# Patient Record
Sex: FEMALE | Race: WHITE | NOT HISPANIC OR LATINO | Employment: UNEMPLOYED | URBAN - METROPOLITAN AREA
[De-identification: names, ages, dates, MRNs, and addresses within clinical notes are randomized per-mention and may not be internally consistent; named-entity substitution may affect disease eponyms.]

---

## 2017-01-03 ENCOUNTER — HISTORICAL (OUTPATIENT)
Dept: INTERNAL MEDICINE | Facility: CLINIC | Age: 57
End: 2017-01-03

## 2017-02-17 ENCOUNTER — HISTORICAL (OUTPATIENT)
Dept: WOUND CARE | Facility: HOSPITAL | Age: 57
End: 2017-02-17

## 2017-03-23 ENCOUNTER — HISTORICAL (OUTPATIENT)
Dept: RADIOLOGY | Facility: HOSPITAL | Age: 57
End: 2017-03-23

## 2017-09-08 ENCOUNTER — HISTORICAL (OUTPATIENT)
Dept: INTERNAL MEDICINE | Facility: CLINIC | Age: 57
End: 2017-09-08

## 2017-09-08 LAB
ABS NEUT (OLG): 4.54 X10(3)/MCL (ref 2.1–9.2)
ALBUMIN SERPL-MCNC: 3.3 GM/DL (ref 3.4–5)
ALBUMIN/GLOB SERPL: 1 RATIO (ref 1–2)
ALP SERPL-CCNC: 91 UNIT/L (ref 45–117)
ALT SERPL-CCNC: 32 UNIT/L (ref 12–78)
AST SERPL-CCNC: 20 UNIT/L (ref 15–37)
BASOPHILS # BLD AUTO: 0.01 X10(3)/MCL
BASOPHILS NFR BLD AUTO: 0 % (ref 0–1)
BILIRUB SERPL-MCNC: 0.4 MG/DL (ref 0.2–1)
BILIRUBIN DIRECT+TOT PNL SERPL-MCNC: <0.1 MG/DL
BILIRUBIN DIRECT+TOT PNL SERPL-MCNC: >0.3 MG/DL
BUN SERPL-MCNC: 13 MG/DL (ref 7–18)
CALCIUM SERPL-MCNC: 8.9 MG/DL (ref 8.5–10.1)
CHLORIDE SERPL-SCNC: 112 MMOL/L (ref 98–107)
CHOLEST SERPL-MCNC: 168 MG/DL
CHOLEST/HDLC SERPL: 4 {RATIO} (ref 0–4.4)
CO2 SERPL-SCNC: 28 MMOL/L (ref 21–32)
CREAT SERPL-MCNC: 0.5 MG/DL (ref 0.6–1.3)
EOSINOPHIL # BLD AUTO: 0.31 X10(3)/MCL
EOSINOPHIL NFR BLD AUTO: 4 % (ref 0–5)
ERYTHROCYTE [DISTWIDTH] IN BLOOD BY AUTOMATED COUNT: 13.2 % (ref 11.5–14.5)
EST. AVERAGE GLUCOSE BLD GHB EST-MCNC: 117 MG/DL
GLOBULIN SER-MCNC: 3.8 GM/ML (ref 2.3–3.5)
GLUCOSE SERPL-MCNC: 86 MG/DL (ref 74–106)
HBA1C MFR BLD: 5.7 % (ref 4.2–6.3)
HCT VFR BLD AUTO: 43.5 % (ref 35–46)
HDLC SERPL-MCNC: 42 MG/DL
HGB BLD-MCNC: 14.3 GM/DL (ref 12–16)
IMM GRANULOCYTES # BLD AUTO: 0.02 10*3/UL
IMM GRANULOCYTES NFR BLD AUTO: 0 %
LDLC SERPL CALC-MCNC: 109 MG/DL (ref 0–130)
LYMPHOCYTES # BLD AUTO: 1.48 X10(3)/MCL
LYMPHOCYTES NFR BLD AUTO: 22 % (ref 15–40)
MCH RBC QN AUTO: 31.9 PG (ref 26–34)
MCHC RBC AUTO-ENTMCNC: 32.9 GM/DL (ref 31–37)
MCV RBC AUTO: 97.1 FL (ref 80–100)
MONOCYTES # BLD AUTO: 0.5 X10(3)/MCL
MONOCYTES NFR BLD AUTO: 7 % (ref 4–12)
NEUTROPHILS # BLD AUTO: 4.54 X10(3)/MCL
NEUTROPHILS NFR BLD AUTO: 66 X10(3)/MCL
PLATELET # BLD AUTO: 180 X10(3)/MCL (ref 130–400)
PMV BLD AUTO: 13 FL (ref 7.4–10.4)
POTASSIUM SERPL-SCNC: 4.1 MMOL/L (ref 3.5–5.1)
PROT SERPL-MCNC: 7.1 GM/DL (ref 6.4–8.2)
RBC # BLD AUTO: 4.48 X10(6)/MCL (ref 4–5.2)
SODIUM SERPL-SCNC: 147 MMOL/L (ref 136–145)
TRIGL SERPL-MCNC: 86 MG/DL
TSH SERPL-ACNC: 0.79 MIU/L (ref 0.36–3.74)
VLDLC SERPL CALC-MCNC: 17 MG/DL
WBC # SPEC AUTO: 6.9 X10(3)/MCL (ref 4.5–11)

## 2018-02-28 ENCOUNTER — HISTORICAL (OUTPATIENT)
Dept: INTERNAL MEDICINE | Facility: CLINIC | Age: 58
End: 2018-02-28

## 2018-02-28 LAB
ABS NEUT (OLG): 4.28 X10(3)/MCL (ref 2.1–9.2)
ALBUMIN SERPL-MCNC: 3.5 GM/DL (ref 3.4–5)
ALBUMIN/GLOB SERPL: 1 RATIO (ref 1–2)
ALP SERPL-CCNC: 82 UNIT/L (ref 45–117)
ALT SERPL-CCNC: 34 UNIT/L (ref 12–78)
AST SERPL-CCNC: 23 UNIT/L (ref 15–37)
BASOPHILS # BLD AUTO: 0.01 X10(3)/MCL
BASOPHILS NFR BLD AUTO: 0 %
BILIRUB SERPL-MCNC: 0.3 MG/DL (ref 0.2–1)
BILIRUBIN DIRECT+TOT PNL SERPL-MCNC: <0.1 MG/DL
BILIRUBIN DIRECT+TOT PNL SERPL-MCNC: ABNORMAL MG/DL
BUN SERPL-MCNC: 18 MG/DL (ref 7–18)
CALCIUM SERPL-MCNC: 8.8 MG/DL (ref 8.5–10.1)
CHLORIDE SERPL-SCNC: 106 MMOL/L (ref 98–107)
CHOLEST SERPL-MCNC: 163 MG/DL
CHOLEST/HDLC SERPL: 3.6 {RATIO} (ref 0–4.4)
CO2 SERPL-SCNC: 33 MMOL/L (ref 21–32)
CREAT SERPL-MCNC: 0.5 MG/DL (ref 0.6–1.3)
EOSINOPHIL # BLD AUTO: 0.15 10*3/UL
EOSINOPHIL NFR BLD AUTO: 2 %
ERYTHROCYTE [DISTWIDTH] IN BLOOD BY AUTOMATED COUNT: 13 % (ref 11.5–14.5)
EST. AVERAGE GLUCOSE BLD GHB EST-MCNC: 128 MG/DL
GLOBULIN SER-MCNC: 3.8 GM/ML (ref 2.3–3.5)
GLUCOSE SERPL-MCNC: 87 MG/DL (ref 74–106)
HBA1C MFR BLD: 6.1 % (ref 4.2–6.3)
HCT VFR BLD AUTO: 42.9 % (ref 35–46)
HDLC SERPL-MCNC: 45 MG/DL
HGB BLD-MCNC: 14.1 GM/DL (ref 12–16)
IMM GRANULOCYTES # BLD AUTO: 0.02 10*3/UL
IMM GRANULOCYTES NFR BLD AUTO: 0 %
LDLC SERPL CALC-MCNC: 101 MG/DL (ref 0–130)
LYMPHOCYTES # BLD AUTO: 1.48 X10(3)/MCL
LYMPHOCYTES NFR BLD AUTO: 23 % (ref 13–40)
MCH RBC QN AUTO: 32.3 PG (ref 26–34)
MCHC RBC AUTO-ENTMCNC: 32.9 GM/DL (ref 31–37)
MCV RBC AUTO: 98.4 FL (ref 80–100)
MONOCYTES # BLD AUTO: 0.55 X10(3)/MCL
MONOCYTES NFR BLD AUTO: 8 % (ref 4–12)
NEUTROPHILS # BLD AUTO: 4.28 X10(3)/MCL
NEUTROPHILS NFR BLD AUTO: 66 X10(3)/MCL
PLATELET # BLD AUTO: 165 X10(3)/MCL (ref 130–400)
PMV BLD AUTO: 12.7 FL (ref 7.4–10.4)
POTASSIUM SERPL-SCNC: 4.3 MMOL/L (ref 3.5–5.1)
PROT SERPL-MCNC: 7.3 GM/DL (ref 6.4–8.2)
RBC # BLD AUTO: 4.36 X10(6)/MCL (ref 4–5.2)
SODIUM SERPL-SCNC: 143 MMOL/L (ref 136–145)
TRIGL SERPL-MCNC: 85 MG/DL
TSH SERPL-ACNC: 1.41 MIU/L (ref 0.36–3.74)
VLDLC SERPL CALC-MCNC: 17 MG/DL
WBC # SPEC AUTO: 6.5 X10(3)/MCL (ref 4.5–11)

## 2018-09-11 ENCOUNTER — HISTORICAL (OUTPATIENT)
Dept: WOUND CARE | Facility: HOSPITAL | Age: 58
End: 2018-09-11

## 2018-12-18 ENCOUNTER — HISTORICAL (OUTPATIENT)
Dept: INTERNAL MEDICINE | Facility: CLINIC | Age: 58
End: 2018-12-18

## 2018-12-18 ENCOUNTER — HISTORICAL (OUTPATIENT)
Dept: ADMINISTRATIVE | Facility: HOSPITAL | Age: 58
End: 2018-12-18

## 2018-12-18 LAB
ABS NEUT (OLG): 5.06 X10(3)/MCL (ref 2.1–9.2)
ALBUMIN SERPL-MCNC: 3.6 GM/DL (ref 3.4–5)
ALBUMIN/GLOB SERPL: 1 RATIO (ref 1–2)
ALP SERPL-CCNC: 89 UNIT/L (ref 45–117)
ALT SERPL-CCNC: 33 UNIT/L (ref 12–78)
AST SERPL-CCNC: 22 UNIT/L (ref 15–37)
BILIRUB SERPL-MCNC: 0.3 MG/DL (ref 0.2–1)
BILIRUBIN DIRECT+TOT PNL SERPL-MCNC: <0.1 MG/DL
BILIRUBIN DIRECT+TOT PNL SERPL-MCNC: ABNORMAL MG/DL
BUN SERPL-MCNC: 15 MG/DL (ref 7–18)
CALCIUM SERPL-MCNC: 8.9 MG/DL (ref 8.5–10.1)
CHLORIDE SERPL-SCNC: 106 MMOL/L (ref 98–107)
CHOLEST SERPL-MCNC: 158 MG/DL
CHOLEST/HDLC SERPL: 4 {RATIO} (ref 0–4.4)
CO2 SERPL-SCNC: 31 MMOL/L (ref 21–32)
CREAT SERPL-MCNC: 0.5 MG/DL (ref 0.6–1.3)
EOSINOPHIL # BLD AUTO: 0.15 X10(3)/MCL
EOSINOPHIL NFR BLD AUTO: 2 %
ERYTHROCYTE [DISTWIDTH] IN BLOOD BY AUTOMATED COUNT: 12.9 % (ref 11.5–14.5)
EST. AVERAGE GLUCOSE BLD GHB EST-MCNC: 114 MG/DL
GLOBULIN SER-MCNC: 4 GM/ML (ref 2.3–3.5)
GLUCOSE SERPL-MCNC: 91 MG/DL (ref 74–106)
HBA1C MFR BLD: 5.6 % (ref 4.2–6.3)
HCT VFR BLD AUTO: 44.2 % (ref 35–46)
HDLC SERPL-MCNC: 40 MG/DL
HGB BLD-MCNC: 14.4 GM/DL (ref 12–16)
IMM GRANULOCYTES # BLD AUTO: 0.01 10*3/UL
IMM GRANULOCYTES NFR BLD AUTO: 0 %
LDLC SERPL CALC-MCNC: 94 MG/DL (ref 0–130)
LYMPHOCYTES # BLD AUTO: 1.21 X10(3)/MCL
LYMPHOCYTES NFR BLD AUTO: 17 % (ref 13–40)
MCH RBC QN AUTO: 31.8 PG (ref 26–34)
MCHC RBC AUTO-ENTMCNC: 32.6 GM/DL (ref 31–37)
MCV RBC AUTO: 97.6 FL (ref 80–100)
MONOCYTES # BLD AUTO: 0.55 X10(3)/MCL
MONOCYTES NFR BLD AUTO: 8 % (ref 4–12)
NEUTROPHILS # BLD AUTO: 5.06 X10(3)/MCL
NEUTROPHILS NFR BLD AUTO: 73 X10(3)/MCL
PLATELET # BLD AUTO: 164 X10(3)/MCL (ref 130–400)
PMV BLD AUTO: 13.3 FL (ref 7.4–10.4)
POTASSIUM SERPL-SCNC: 4.7 MMOL/L (ref 3.5–5.1)
PROT SERPL-MCNC: 7.6 GM/DL (ref 6.4–8.2)
RBC # BLD AUTO: 4.53 X10(6)/MCL (ref 4–5.2)
SODIUM SERPL-SCNC: 143 MMOL/L (ref 136–145)
TRIGL SERPL-MCNC: 118 MG/DL
TSH SERPL-ACNC: 1.36 MIU/L (ref 0.36–3.74)
VLDLC SERPL CALC-MCNC: 24 MG/DL
WBC # SPEC AUTO: 7 X10(3)/MCL (ref 4.5–11)

## 2019-01-14 ENCOUNTER — HISTORICAL (OUTPATIENT)
Dept: INTERNAL MEDICINE | Facility: CLINIC | Age: 59
End: 2019-01-14

## 2019-09-09 LAB
HIGH RISK HPV 16 (PRECISION): NEGATIVE
HIGH RISK HPV 18/45 (PRECISION): NEGATIVE
PAP RECOMMENDATION EXT: NORMAL
PAP SMEAR: NORMAL

## 2019-09-25 ENCOUNTER — HISTORICAL (OUTPATIENT)
Dept: RADIOLOGY | Facility: HOSPITAL | Age: 59
End: 2019-09-25

## 2019-10-18 ENCOUNTER — HISTORICAL (OUTPATIENT)
Dept: INTERNAL MEDICINE | Facility: CLINIC | Age: 59
End: 2019-10-18

## 2019-10-18 LAB
ABS NEUT (OLG): 3.29 X10(3)/MCL (ref 2.1–9.2)
APPEARANCE, UA: CLEAR
BACTERIA #/AREA URNS AUTO: ABNORMAL /HPF
BILIRUB UR QL STRIP: NEGATIVE
COLOR UR: YELLOW
EOSINOPHIL # BLD AUTO: 0.2 X10(3)/MCL (ref 0–0.9)
EOSINOPHIL NFR BLD AUTO: 3 %
ERYTHROCYTE [DISTWIDTH] IN BLOOD BY AUTOMATED COUNT: 13.2 % (ref 11.5–14.5)
EST. AVERAGE GLUCOSE BLD GHB EST-MCNC: 111 MG/DL
GLUCOSE (UA): NORMAL
HBA1C MFR BLD: 5.5 % (ref 4.2–6.3)
HCT VFR BLD AUTO: 43.7 % (ref 35–46)
HGB BLD-MCNC: 13.9 GM/DL (ref 12–16)
HGB UR QL STRIP: NEGATIVE
HYALINE CASTS #/AREA URNS LPF: ABNORMAL /LPF
IMM GRANULOCYTES # BLD AUTO: 0.02 10*3/UL
IMM GRANULOCYTES NFR BLD AUTO: 0 %
KETONES UR QL STRIP: NEGATIVE
LEUKOCYTE ESTERASE UR QL STRIP: NEGATIVE
LYMPHOCYTES # BLD AUTO: 1.4 X10(3)/MCL (ref 0.6–4.6)
LYMPHOCYTES NFR BLD AUTO: 26 %
MCH RBC QN AUTO: 32.3 PG (ref 26–34)
MCHC RBC AUTO-ENTMCNC: 31.8 GM/DL (ref 31–37)
MCV RBC AUTO: 101.6 FL (ref 80–100)
MONOCYTES # BLD AUTO: 0.5 X10(3)/MCL (ref 0.1–1.3)
MONOCYTES NFR BLD AUTO: 9 %
NEUTROPHILS # BLD AUTO: 3.29 X10(3)/MCL (ref 2.1–9.2)
NEUTROPHILS NFR BLD AUTO: 62 %
NITRITE UR QL STRIP: NEGATIVE
PH UR STRIP: 5.5 [PH] (ref 4.5–8)
PLATELET # BLD AUTO: 147 X10(3)/MCL (ref 130–400)
PMV BLD AUTO: 13.6 FL (ref 7.4–10.4)
PROT UR QL STRIP: 10 MG/DL
RBC # BLD AUTO: 4.3 X10(6)/MCL (ref 4–5.2)
RBC #/AREA URNS AUTO: ABNORMAL /HPF
SP GR UR STRIP: 1.02 (ref 1–1.03)
SQUAMOUS #/AREA URNS LPF: >100 /LPF
UROBILINOGEN UR STRIP-ACNC: NORMAL
WBC # SPEC AUTO: 5.3 X10(3)/MCL (ref 4.5–11)
WBC #/AREA URNS AUTO: ABNORMAL /HPF

## 2019-10-24 ENCOUNTER — HISTORICAL (OUTPATIENT)
Dept: ADMINISTRATIVE | Facility: HOSPITAL | Age: 59
End: 2019-10-24

## 2019-10-24 LAB
ALBUMIN SERPL-MCNC: 4 GM/DL (ref 3.4–5)
ALBUMIN/GLOB SERPL: 1.2 RATIO (ref 1.1–2)
ALP SERPL-CCNC: 93 UNIT/L (ref 45–117)
ALT SERPL-CCNC: 37 UNIT/L (ref 12–78)
AST SERPL-CCNC: 33 UNIT/L (ref 15–37)
BILIRUB SERPL-MCNC: 0.5 MG/DL (ref 0.2–1)
BILIRUBIN DIRECT+TOT PNL SERPL-MCNC: 0.1 MG/DL (ref 0–0.2)
BILIRUBIN DIRECT+TOT PNL SERPL-MCNC: 0.4 MG/DL
BUN SERPL-MCNC: 22 MG/DL (ref 7–18)
CALCIUM SERPL-MCNC: 8.8 MG/DL (ref 8.5–10.1)
CHLORIDE SERPL-SCNC: 108 MMOL/L (ref 98–107)
CHOLEST SERPL-MCNC: 161 MG/DL
CHOLEST/HDLC SERPL: 3.7 {RATIO} (ref 0–4.4)
CO2 SERPL-SCNC: 28 MMOL/L (ref 21–32)
CREAT SERPL-MCNC: 0.7 MG/DL (ref 0.6–1.3)
GLOBULIN SER-MCNC: 3.4 GM/ML (ref 2.3–3.5)
GLUCOSE SERPL-MCNC: 118 MG/DL (ref 74–106)
HDLC SERPL-MCNC: 44 MG/DL (ref 40–59)
LDLC SERPL CALC-MCNC: 95 MG/DL
POTASSIUM SERPL-SCNC: 3.4 MMOL/L (ref 3.5–5.1)
PROT SERPL-MCNC: 7.4 GM/DL (ref 6.4–8.2)
SODIUM SERPL-SCNC: 140 MMOL/L (ref 136–145)
TRIGL SERPL-MCNC: 109 MG/DL
VLDLC SERPL CALC-MCNC: 22 MG/DL

## 2020-09-09 LAB
BILIRUB SERPL-MCNC: NEGATIVE MG/DL
BLOOD URINE, POC: NEGATIVE
CLARITY, POC UA: CLEAR
COLOR, POC UA: YELLOW
GLUCOSE UR QL STRIP: NEGATIVE
KETONES UR QL STRIP: NEGATIVE
LEUKOCYTE EST, POC UA: NEGATIVE
NITRITE, POC UA: NEGATIVE
PH, POC UA: 6
PROTEIN, POC: NEGATIVE
SPECIFIC GRAVITY, POC UA: 1.01
UROBILINOGEN, POC UA: NORMAL

## 2020-09-24 ENCOUNTER — HISTORICAL (OUTPATIENT)
Dept: RADIOLOGY | Facility: HOSPITAL | Age: 60
End: 2020-09-24

## 2020-12-05 ENCOUNTER — HISTORICAL (OUTPATIENT)
Dept: ADMINISTRATIVE | Facility: HOSPITAL | Age: 60
End: 2020-12-05

## 2020-12-05 LAB
ABS NEUT (OLG): 5.55 X10(3)/MCL (ref 2.1–9.2)
ALBUMIN SERPL-MCNC: 4 GM/DL (ref 3.4–4.8)
ALBUMIN/GLOB SERPL: 1.1 RATIO (ref 1.1–2)
ALP SERPL-CCNC: 104 UNIT/L (ref 40–150)
ALT SERPL-CCNC: 25 UNIT/L (ref 0–55)
AST SERPL-CCNC: 27 UNIT/L (ref 5–34)
BILIRUB SERPL-MCNC: 0.6 MG/DL
BILIRUBIN DIRECT+TOT PNL SERPL-MCNC: 0.2 MG/DL (ref 0–0.5)
BILIRUBIN DIRECT+TOT PNL SERPL-MCNC: 0.4 MG/DL (ref 0–0.8)
BUN SERPL-MCNC: 12 MG/DL (ref 9.8–20.1)
CALCIUM SERPL-MCNC: 9.5 MG/DL (ref 8.4–10.2)
CHLORIDE SERPL-SCNC: 105 MMOL/L (ref 98–107)
CHOLEST SERPL-MCNC: 171 MG/DL
CHOLEST/HDLC SERPL: 4 {RATIO} (ref 0–5)
CO2 SERPL-SCNC: 29 MMOL/L (ref 23–31)
CREAT SERPL-MCNC: 0.62 MG/DL (ref 0.55–1.02)
DEPRECATED CALCIDIOL+CALCIFEROL SERPL-MC: 30.6 NG/ML (ref 30–80)
EOSINOPHIL # BLD AUTO: 0 X10(3)/MCL (ref 0–0.9)
EOSINOPHIL NFR BLD AUTO: 1 %
ERYTHROCYTE [DISTWIDTH] IN BLOOD BY AUTOMATED COUNT: 12.7 % (ref 11.5–14.5)
EST. AVERAGE GLUCOSE BLD GHB EST-MCNC: 108.3 MG/DL
GLOBULIN SER-MCNC: 3.5 GM/DL (ref 2.4–3.5)
GLUCOSE SERPL-MCNC: 99 MG/DL (ref 82–115)
HAV IGM SERPL QL IA: NONREACTIVE
HBA1C MFR BLD: 5.4 %
HBV CORE IGM SERPL QL IA: NONREACTIVE
HBV SURFACE AG SERPL QL IA: NONREACTIVE
HCT VFR BLD AUTO: 46.1 % (ref 35–46)
HCV AB SERPL QL IA: NONREACTIVE
HDLC SERPL-MCNC: 46 MG/DL (ref 35–60)
HGB BLD-MCNC: 14.4 GM/DL (ref 12–16)
IMM GRANULOCYTES # BLD AUTO: 0.03 10*3/UL
IMM GRANULOCYTES NFR BLD AUTO: 0 %
LDLC SERPL CALC-MCNC: 107 MG/DL (ref 50–140)
LYMPHOCYTES # BLD AUTO: 1.4 X10(3)/MCL (ref 0.6–4.6)
LYMPHOCYTES NFR BLD AUTO: 19 %
MCH RBC QN AUTO: 31.5 PG (ref 26–34)
MCHC RBC AUTO-ENTMCNC: 31.2 GM/DL (ref 31–37)
MCV RBC AUTO: 100.9 FL (ref 80–100)
MONOCYTES # BLD AUTO: 0.6 X10(3)/MCL (ref 0.1–1.3)
MONOCYTES NFR BLD AUTO: 7 %
NEUTROPHILS # BLD AUTO: 5.55 X10(3)/MCL (ref 2.1–9.2)
NEUTROPHILS NFR BLD AUTO: 73 %
PLATELET # BLD AUTO: 169 X10(3)/MCL (ref 130–400)
PMV BLD AUTO: 12.7 FL (ref 7.4–10.4)
POTASSIUM SERPL-SCNC: 5.3 MMOL/L (ref 3.5–5.1)
PROT SERPL-MCNC: 7.5 GM/DL (ref 5.8–7.6)
RBC # BLD AUTO: 4.57 X10(6)/MCL (ref 4–5.2)
SODIUM SERPL-SCNC: 143 MMOL/L (ref 136–145)
TRIGL SERPL-MCNC: 90 MG/DL (ref 37–140)
VLDLC SERPL CALC-MCNC: 18 MG/DL
WBC # SPEC AUTO: 7.6 X10(3)/MCL (ref 4.5–11)

## 2020-12-10 ENCOUNTER — HISTORICAL (OUTPATIENT)
Dept: INTERNAL MEDICINE | Facility: CLINIC | Age: 60
End: 2020-12-10

## 2020-12-10 LAB
ALBUMIN SERPL-MCNC: 4 GM/DL (ref 3.4–4.8)
ALBUMIN/GLOB SERPL: 1.1 RATIO (ref 1.1–2)
ALP SERPL-CCNC: 107 UNIT/L (ref 40–150)
ALT SERPL-CCNC: 24 UNIT/L (ref 0–55)
AST SERPL-CCNC: 22 UNIT/L (ref 5–34)
BILIRUB SERPL-MCNC: 0.5 MG/DL
BILIRUBIN DIRECT+TOT PNL SERPL-MCNC: 0.2 MG/DL (ref 0–0.5)
BILIRUBIN DIRECT+TOT PNL SERPL-MCNC: 0.3 MG/DL (ref 0–0.8)
BUN SERPL-MCNC: 11 MG/DL (ref 9.8–20.1)
CALCIUM SERPL-MCNC: 9.3 MG/DL (ref 8.4–10.2)
CHLORIDE SERPL-SCNC: 105 MMOL/L (ref 98–107)
CO2 SERPL-SCNC: 27 MMOL/L (ref 23–31)
CREAT SERPL-MCNC: 0.63 MG/DL (ref 0.55–1.02)
GLOBULIN SER-MCNC: 3.6 GM/DL (ref 2.4–3.5)
GLUCOSE SERPL-MCNC: 91 MG/DL (ref 82–115)
POTASSIUM SERPL-SCNC: 4.4 MMOL/L (ref 3.5–5.1)
PROT SERPL-MCNC: 7.6 GM/DL (ref 5.8–7.6)
SODIUM SERPL-SCNC: 142 MMOL/L (ref 136–145)

## 2021-02-18 ENCOUNTER — HISTORICAL (OUTPATIENT)
Dept: RADIOLOGY | Facility: HOSPITAL | Age: 61
End: 2021-02-18

## 2021-03-10 ENCOUNTER — HISTORICAL (OUTPATIENT)
Dept: INTERNAL MEDICINE | Facility: CLINIC | Age: 61
End: 2021-03-10

## 2021-03-10 LAB
ABS NEUT (OLG): 4.99 X10(3)/MCL (ref 2.1–9.2)
ALBUMIN SERPL-MCNC: 3.7 GM/DL (ref 3.4–4.8)
ALBUMIN/GLOB SERPL: 1 RATIO (ref 1.1–2)
ALP SERPL-CCNC: 95 UNIT/L (ref 40–150)
ALT SERPL-CCNC: 29 UNIT/L (ref 0–55)
AST SERPL-CCNC: 29 UNIT/L (ref 5–34)
BASOPHILS # BLD AUTO: 0 X10(3)/MCL (ref 0–0.2)
BASOPHILS NFR BLD AUTO: 0 %
BILIRUB SERPL-MCNC: 0.4 MG/DL
BILIRUBIN DIRECT+TOT PNL SERPL-MCNC: 0.2 MG/DL (ref 0–0.5)
BILIRUBIN DIRECT+TOT PNL SERPL-MCNC: 0.2 MG/DL (ref 0–0.8)
BUN SERPL-MCNC: 12.5 MG/DL (ref 9.8–20.1)
CALCIUM SERPL-MCNC: 9.3 MG/DL (ref 8.4–10.2)
CHLORIDE SERPL-SCNC: 107 MMOL/L (ref 98–107)
CHOLEST SERPL-MCNC: 170 MG/DL
CHOLEST/HDLC SERPL: 4 {RATIO} (ref 0–5)
CO2 SERPL-SCNC: 28 MMOL/L (ref 23–31)
CREAT SERPL-MCNC: 0.57 MG/DL (ref 0.55–1.02)
EOSINOPHIL # BLD AUTO: 0.1 X10(3)/MCL (ref 0–0.9)
EOSINOPHIL NFR BLD AUTO: 1 %
ERYTHROCYTE [DISTWIDTH] IN BLOOD BY AUTOMATED COUNT: 13.4 % (ref 11.5–14.5)
GLOBULIN SER-MCNC: 3.6 GM/DL (ref 2.4–3.5)
GLUCOSE SERPL-MCNC: 86 MG/DL (ref 82–115)
HCT VFR BLD AUTO: 45.7 % (ref 35–46)
HDLC SERPL-MCNC: 46 MG/DL (ref 35–60)
HGB BLD-MCNC: 14.3 GM/DL (ref 12–16)
IMM GRANULOCYTES # BLD AUTO: 0.03 10*3/UL
IMM GRANULOCYTES NFR BLD AUTO: 0 %
LDLC SERPL CALC-MCNC: 106 MG/DL (ref 50–140)
LYMPHOCYTES # BLD AUTO: 1.3 X10(3)/MCL (ref 0.6–4.6)
LYMPHOCYTES NFR BLD AUTO: 18 %
MCH RBC QN AUTO: 31.4 PG (ref 26–34)
MCHC RBC AUTO-ENTMCNC: 31.3 GM/DL (ref 31–37)
MCV RBC AUTO: 100.4 FL (ref 80–100)
MONOCYTES # BLD AUTO: 0.7 X10(3)/MCL (ref 0.1–1.3)
MONOCYTES NFR BLD AUTO: 10 %
NEUTROPHILS # BLD AUTO: 4.99 X10(3)/MCL (ref 2.1–9.2)
NEUTROPHILS NFR BLD AUTO: 70 %
PLATELET # BLD AUTO: 150 X10(3)/MCL (ref 130–400)
PMV BLD AUTO: 12.7 FL (ref 7.4–10.4)
POTASSIUM SERPL-SCNC: 5.2 MMOL/L (ref 3.5–5.1)
PROT SERPL-MCNC: 7.3 GM/DL (ref 5.8–7.6)
RBC # BLD AUTO: 4.55 X10(6)/MCL (ref 4–5.2)
SODIUM SERPL-SCNC: 145 MMOL/L (ref 136–145)
TRIGL SERPL-MCNC: 90 MG/DL (ref 37–140)
TSH SERPL-ACNC: 1.38 UIU/ML (ref 0.35–4.94)
VLDLC SERPL CALC-MCNC: 18 MG/DL
WBC # SPEC AUTO: 7.2 X10(3)/MCL (ref 4.5–11)

## 2021-03-11 ENCOUNTER — HISTORICAL (OUTPATIENT)
Dept: ADMINISTRATIVE | Facility: HOSPITAL | Age: 61
End: 2021-03-11

## 2021-03-11 ENCOUNTER — HISTORICAL (OUTPATIENT)
Dept: RADIOLOGY | Facility: HOSPITAL | Age: 61
End: 2021-03-11

## 2021-04-01 ENCOUNTER — HISTORICAL (OUTPATIENT)
Dept: RADIOLOGY | Facility: HOSPITAL | Age: 61
End: 2021-04-01

## 2021-05-13 ENCOUNTER — HISTORICAL (OUTPATIENT)
Dept: RADIOLOGY | Facility: HOSPITAL | Age: 61
End: 2021-05-13

## 2021-06-09 ENCOUNTER — HISTORICAL (OUTPATIENT)
Dept: INTERNAL MEDICINE | Facility: CLINIC | Age: 61
End: 2021-06-09

## 2021-06-09 LAB
ABS NEUT (OLG): 4.75 X10(3)/MCL (ref 2.1–9.2)
ALBUMIN SERPL-MCNC: 3.8 GM/DL (ref 3.4–4.8)
ALBUMIN/GLOB SERPL: 1.2 RATIO (ref 1.1–2)
ALP SERPL-CCNC: 102 UNIT/L (ref 40–150)
ALT SERPL-CCNC: 32 UNIT/L (ref 0–55)
APPEARANCE, UA: CLEAR
AST SERPL-CCNC: 31 UNIT/L (ref 5–34)
BACTERIA #/AREA URNS AUTO: ABNORMAL /HPF
BASOPHILS # BLD AUTO: 0 X10(3)/MCL (ref 0–0.2)
BASOPHILS NFR BLD AUTO: 0 %
BILIRUB SERPL-MCNC: 0.4 MG/DL
BILIRUB UR QL STRIP: NEGATIVE
BILIRUBIN DIRECT+TOT PNL SERPL-MCNC: 0.2 MG/DL (ref 0–0.5)
BILIRUBIN DIRECT+TOT PNL SERPL-MCNC: 0.2 MG/DL (ref 0–0.8)
BUN SERPL-MCNC: 18.8 MG/DL (ref 9.8–20.1)
CALCIUM SERPL-MCNC: 9.5 MG/DL (ref 8.4–10.2)
CHLORIDE SERPL-SCNC: 103 MMOL/L (ref 98–107)
CHOLEST SERPL-MCNC: 162 MG/DL
CHOLEST/HDLC SERPL: 4 {RATIO} (ref 0–5)
CO2 SERPL-SCNC: 29 MMOL/L (ref 23–31)
COLOR UR: YELLOW
CREAT SERPL-MCNC: 0.59 MG/DL (ref 0.55–1.02)
DEPRECATED CALCIDIOL+CALCIFEROL SERPL-MC: 38.7 NG/ML (ref 30–80)
EOSINOPHIL # BLD AUTO: 0 X10(3)/MCL (ref 0–0.9)
EOSINOPHIL NFR BLD AUTO: 1 %
ERYTHROCYTE [DISTWIDTH] IN BLOOD BY AUTOMATED COUNT: 13.1 % (ref 11.5–14.5)
GLOBULIN SER-MCNC: 3.3 GM/DL (ref 2.4–3.5)
GLUCOSE (UA): NEGATIVE
GLUCOSE SERPL-MCNC: 85 MG/DL (ref 82–115)
HCT VFR BLD AUTO: 45 % (ref 35–46)
HDLC SERPL-MCNC: 42 MG/DL (ref 35–60)
HGB BLD-MCNC: 14.2 GM/DL (ref 12–16)
HGB UR QL STRIP: NEGATIVE
HYALINE CASTS #/AREA URNS LPF: ABNORMAL /LPF
IMM GRANULOCYTES # BLD AUTO: 0.03 10*3/UL
IMM GRANULOCYTES NFR BLD AUTO: 0 %
KETONES UR QL STRIP: NEGATIVE
LDLC SERPL CALC-MCNC: 103 MG/DL (ref 50–140)
LEUKOCYTE ESTERASE UR QL STRIP: NEGATIVE
LYMPHOCYTES # BLD AUTO: 1.4 X10(3)/MCL (ref 0.6–4.6)
LYMPHOCYTES NFR BLD AUTO: 20 %
MCH RBC QN AUTO: 31.6 PG (ref 26–34)
MCHC RBC AUTO-ENTMCNC: 31.6 GM/DL (ref 31–37)
MCV RBC AUTO: 100 FL (ref 80–100)
MONOCYTES # BLD AUTO: 0.7 X10(3)/MCL (ref 0.1–1.3)
MONOCYTES NFR BLD AUTO: 10 %
NEUTROPHILS # BLD AUTO: 4.75 X10(3)/MCL (ref 2.1–9.2)
NEUTROPHILS NFR BLD AUTO: 69 %
NITRITE UR QL STRIP: NEGATIVE
NRBC BLD AUTO-RTO: 0 % (ref 0–0.2)
PH UR STRIP: 5.5 [PH] (ref 4.5–8)
PLATELET # BLD AUTO: 160 X10(3)/MCL (ref 130–400)
PMV BLD AUTO: 12.2 FL (ref 7.4–10.4)
POTASSIUM SERPL-SCNC: 4.1 MMOL/L (ref 3.5–5.1)
PROT SERPL-MCNC: 7.1 GM/DL (ref 5.8–7.6)
PROT UR QL STRIP: 20 MG/DL
RBC # BLD AUTO: 4.5 X10(6)/MCL (ref 4–5.2)
RBC #/AREA URNS AUTO: ABNORMAL /HPF
SODIUM SERPL-SCNC: 141 MMOL/L (ref 136–145)
SP GR UR STRIP: 1.03 (ref 1–1.03)
SQUAMOUS #/AREA URNS LPF: >100 /LPF
TRIGL SERPL-MCNC: 84 MG/DL (ref 37–140)
UROBILINOGEN UR STRIP-ACNC: 2 MG/DL
VLDLC SERPL CALC-MCNC: 17 MG/DL
WBC # SPEC AUTO: 6.9 X10(3)/MCL (ref 4.5–11)
WBC #/AREA URNS AUTO: ABNORMAL /HPF

## 2021-06-10 ENCOUNTER — HISTORICAL (OUTPATIENT)
Dept: ADMINISTRATIVE | Facility: HOSPITAL | Age: 61
End: 2021-06-10

## 2021-06-10 LAB
APPEARANCE, UA: CLEAR
BACTERIA #/AREA URNS AUTO: ABNORMAL /HPF
BILIRUB UR QL STRIP: NEGATIVE
COLOR UR: YELLOW
GLUCOSE (UA): NEGATIVE
HGB UR QL STRIP: NEGATIVE
HYALINE CASTS #/AREA URNS LPF: ABNORMAL /LPF
KETONES UR QL STRIP: NEGATIVE
LEUKOCYTE ESTERASE UR QL STRIP: NEGATIVE
NITRITE UR QL STRIP: NEGATIVE
PH UR STRIP: 5.5 [PH] (ref 4.5–8)
PROT UR QL STRIP: 10 MG/DL
RBC #/AREA URNS AUTO: ABNORMAL /HPF
SP GR UR STRIP: 1.02 (ref 1–1.03)
SQUAMOUS #/AREA URNS LPF: >100 /LPF
UROBILINOGEN UR STRIP-ACNC: 2 MG/DL
WBC #/AREA URNS AUTO: ABNORMAL /HPF

## 2021-07-06 ENCOUNTER — HISTORICAL (OUTPATIENT)
Dept: RADIOLOGY | Facility: HOSPITAL | Age: 61
End: 2021-07-06

## 2021-07-29 ENCOUNTER — HISTORICAL (OUTPATIENT)
Dept: SLEEP MEDICINE | Facility: HOSPITAL | Age: 61
End: 2021-07-29

## 2022-03-10 ENCOUNTER — HISTORICAL (OUTPATIENT)
Dept: INTERNAL MEDICINE | Facility: CLINIC | Age: 62
End: 2022-03-10

## 2022-03-10 LAB
ABS NEUT (OLG): 4.26 (ref 2.1–9.2)
ALBUMIN SERPL-MCNC: 3.6 G/DL (ref 3.4–4.8)
ALBUMIN/GLOB SERPL: 1.1 {RATIO} (ref 1.1–2)
ALP SERPL-CCNC: 86 U/L (ref 40–150)
ALT SERPL-CCNC: 33 U/L (ref 0–55)
AST SERPL-CCNC: 29 U/L (ref 5–34)
BASOPHILS # BLD AUTO: 0 10*3/UL (ref 0–0.2)
BASOPHILS NFR BLD AUTO: 0 %
BILIRUB SERPL-MCNC: 0.4 MG/DL
BILIRUBIN DIRECT+TOT PNL SERPL-MCNC: 0.2 (ref 0–0.5)
BILIRUBIN DIRECT+TOT PNL SERPL-MCNC: 0.2 (ref 0–0.8)
BUN SERPL-MCNC: 11.5 MG/DL (ref 9.8–20.1)
CALCIUM SERPL-MCNC: 9.4 MG/DL (ref 8.7–10.5)
CHLORIDE SERPL-SCNC: 109 MMOL/L (ref 98–107)
CHOLEST SERPL-MCNC: 169 MG/DL
CHOLEST/HDLC SERPL: 4 {RATIO} (ref 0–5)
CO2 SERPL-SCNC: 29 MMOL/L (ref 23–31)
CREAT SERPL-MCNC: 0.53 MG/DL (ref 0.55–1.02)
DEPRECATED CALCIDIOL+CALCIFEROL SERPL-MC: 36.7 NG/ML (ref 30–80)
EOSINOPHIL # BLD AUTO: 0.1 10*3/UL (ref 0–0.9)
EOSINOPHIL NFR BLD AUTO: 1 %
ERYTHROCYTE [DISTWIDTH] IN BLOOD BY AUTOMATED COUNT: 13.8 % (ref 11.5–14.5)
FLAG2 (OHS): 70
FLAG3 (OHS): 80
FLAGS (OHS): 70
GLOBULIN SER-MCNC: 3.2 G/DL (ref 2.4–3.5)
GLUCOSE SERPL-MCNC: 91 MG/DL (ref 82–115)
HCT VFR BLD AUTO: 44.3 % (ref 35–46)
HDLC SERPL-MCNC: 42 MG/DL (ref 35–60)
HEMOLYSIS INTERF INDEX SERPL-ACNC: 1
HGB BLD-MCNC: 14.1 G/DL (ref 12–16)
ICTERIC INTERF INDEX SERPL-ACNC: 0
IMM GRANULOCYTES # BLD AUTO: 0.02 10*3/UL
IMM GRANULOCYTES NFR BLD AUTO: 0 %
LDLC SERPL CALC-MCNC: 107 MG/DL (ref 50–140)
LIPEMIC INTERF INDEX SERPL-ACNC: <0
LOW EVENT # SUSPECT FLAG (OHS): 70
LYMPHOCYTES # BLD AUTO: 1.2 10*3/UL (ref 0.6–4.6)
LYMPHOCYTES NFR BLD AUTO: 19 %
MANUAL DIFF? (OHS): NO
MCH RBC QN AUTO: 32 PG (ref 26–34)
MCHC RBC AUTO-ENTMCNC: 31.8 G/DL (ref 31–37)
MCV RBC AUTO: 100.5 FL (ref 80–100)
MO BLASTS SUSPECT FLAG (OHS): 30
MONOCYTES # BLD AUTO: 0.6 10*3/UL (ref 0.1–1.3)
MONOCYTES NFR BLD AUTO: 9 %
NEUTROPHILS # BLD AUTO: 4.26 10*3/UL (ref 2.1–9.2)
NEUTROPHILS NFR BLD AUTO: 70 %
NRBC BLD AUTO-RTO: 0 % (ref 0–0.2)
PLATELET # BLD AUTO: 171 10*3/UL (ref 130–400)
PLATELET CLUMPS SUSPECT FLAG (OHS): 30
PMV BLD AUTO: 12.4 FL (ref 7.4–10.4)
POTASSIUM SERPL-SCNC: 4.9 MMOL/L (ref 3.5–5.1)
PROT SERPL-MCNC: 6.8 G/DL (ref 5.8–7.6)
RBC # BLD AUTO: 4.41 10*6/UL (ref 4–5.2)
SODIUM SERPL-SCNC: 144 MMOL/L (ref 136–145)
TRIGL SERPL-MCNC: 99 MG/DL (ref 37–140)
VLDLC SERPL CALC-MCNC: 20 MG/DL
WBC # SPEC AUTO: 6.1 10*3/UL (ref 4.5–11)

## 2022-04-12 ENCOUNTER — HISTORICAL (OUTPATIENT)
Dept: ADMINISTRATIVE | Facility: HOSPITAL | Age: 62
End: 2022-04-12
Payer: MEDICAID

## 2022-04-28 ENCOUNTER — HISTORICAL (OUTPATIENT)
Dept: RADIOLOGY | Facility: HOSPITAL | Age: 62
End: 2022-04-28
Payer: MEDICAID

## 2022-04-30 VITALS
BODY MASS INDEX: 34.4 KG/M2 | WEIGHT: 170.63 LBS | SYSTOLIC BLOOD PRESSURE: 121 MMHG | HEIGHT: 59 IN | DIASTOLIC BLOOD PRESSURE: 82 MMHG | OXYGEN SATURATION: 98 %

## 2022-05-05 NOTE — HISTORICAL [facility] CERNER
This is a historical note converted from [employer]. Formatting and pictures may have been removed.  Please reference [employer] for original formatting and attached multimedia. Chief Complaint  FOLLOW UP,RIGHT LEG/KNEE ?PAIN/NUMBNESS/BURNING ?GETTING WORSE ,INJURY 6 YEARS AGO AND 2 FALLS YEARS AGO,KNOT TO RIGHT WRIST  History of Present Illness  60?year old white female, here for f/u labs. BP elevated today at 166/90. Not on meds. c/o burning?pain to right knee and right upper leg and thigh. Had a fall?yrs ago. Also states right wrist?knot.  PMH HLD, HTN, tobacco user 1/2ppd, depression, chronic right knee pain. Mood stable.Taking Effexor 75mg and 37.5mg daily.Was tried on hctz?in the past?but stopped?due to it dropping her too low. Lisinopril caused angioedema.  Denies chest pain, shortness of breath,?cough, fever, headache,?dizziness, weakness, abdominal pain, nausea,?vomiting, diarrhea, constipation, dysuria,?SI, HI,?anxiety.  ?  ?   FIT neg 12/8/2020  Flu - Given 11/2020  Shingrix No. 1 given 12/5/2020;?repeat shingles vaccine due today  Following [facility]  ?  MMG 2/18/2021; IMPRESSION: INCOMPLETE: NEEDS ADDITIONAL IMAGING EVALUATION  Right Breast: Negative (BI-RADS 1)  Left Breast: Incomplete - Need Additional Imaging Evaluation (BI-RADS 0); pt scheduled for today for MMG and US left breast.  ?  Review of Systems  Constitutional: negative except as stated in HPI  Eye: negative except as stated in HPI  ENMT: negative except as stated in HPI  Respiratory: negative except as stated in HPI  Cardiovascular: negative except as stated in HPI  Gastrointestinal: negative except as stated in HPI  Genitourinary: negative except as stated in HPI  Hema/Lymph: negative except as stated in HPI  Endocrine: negative except as stated in HPI  Immunologic: negative except as stated in HPI  Musculoskeletal: negative except as stated in HPI  Integumentary: negative except as stated in HPI  Neurologic: negative except as stated in HPI  ?    All Other ROS_ ?negative except as stated in HPI  Physical Exam  Vitals & Measurements  T:?36.7? ?C (Oral)? HR:?64(Peripheral)? RR:?16? BP:?166/90?  HT:?150.00?cm? WT:?77.800?kg? BMI:?34.58?  General: Alert and oriented, No acute distress.  Eye: Pupils are equal, round and reactive to light, Extraocular movements are intact, Normal conjunctiva.  HENT: Normocephalic, Normal hearing, Oral mucosa is moist, No pharyngeal erythema.  Neck: Supple, Non-tender, No lymphadenopathy, No thyromegaly.  Respiratory: Lungs are clear to auscultation, Respirations are non-labored, Breath sounds are equal, Symmetrical chest wall expansion.  Cardiovascular: Normal rate, Regular rhythm, No murmur, Normal peripheral perfusion, No edema.  Gastrointestinal: Soft, Non-tender, Non-distended, Normal bowel sounds, No organomegaly.  Genitourinary: No costovertebral angle tenderness, No inguinal tenderness.  Lymphatics: No lymphadenopathy neck, axilla, groin.  Musculoskeletal: Normal range of motion, Normal strength, No tenderness, Normal gait. right wrist nodulesmall and nonpainful. ROM full.  Right lateral knee mild swelling, nontender to palp. No increased warmth, swelling noted to leg  Neurologic: No focal deficits, Cranial Nerves II-XII are grossly intact.  Integumentary: Warm, Dry, Intact.  Feet: 2+ pedal pulses bilaterally.  ?  Assessment/Plan  Depression?F32.9  ?Mood stable. Effexor refilled.  Ordered:  1160F- Medication reconciliation completed during visit, HTN (hypertension)  HLD (hyperlipidemia)  Ganglion cyst of wrist  Right knee pain  Vitamin D deficiency  Depression  Need for shingles vaccine  Tobacco use  Obesity, [facility], 03/11/21 11:15:00 CST  Clinic Follow up, *Est. 06/11/21 3:00:00 CDT, Order for future visit, HTN (hypertension)  HLD (hyperlipidemia)  Right knee pain  Ganglion cyst of wrist  Depression  Tobacco use  Obesity, [facility]  Office/Outpatient Visit Level 4 Established 78078 PC, HTN  (hypertension)  HLD (hyperlipidemia)  Ganglion cyst of wrist  Right knee pain  Vitamin D deficiency  Depression  Need for shingles vaccine  Tobacco use  Obesity, [facility], 03/11/21 11:15:00 CST  ?  Ganglion cyst of wrist?M67.439  ?Will continue to monitor, small and nonpainful. ROM full.  Ordered:  1160F- Medication reconciliation completed during visit, HTN (hypertension)  HLD (hyperlipidemia)  Ganglion cyst of wrist  Right knee pain  Vitamin D deficiency  Depression  Need for shingles vaccine  Tobacco use  Obesity, [facility], 03/11/21 11:15:00 CST  Clinic Follow up, *Est. 06/11/21 3:00:00 CDT, Order for future visit, HTN (hypertension)  HLD (hyperlipidemia)  Right knee pain  Ganglion cyst of wrist  Depression  Tobacco use  Obesity, [facility]  Office/Outpatient Visit Level 4 Established 78373 PC, HTN (hypertension)  HLD (hyperlipidemia)  Ganglion cyst of wrist  Right knee pain  Vitamin D deficiency  Depression  Need for shingles vaccine  Tobacco use  [facility], [facility], 03/11/21 11:15:00 CST  ?  HLD (hyperlipidemia)?E78.5  . Simvastatin continued. ASA 81 every other day.?Low fat diet; more whole grain products, fruits, vegetables, lean meats, fish, and poultry.  Ordered:  1160F- Medication reconciliation completed during visit, HTN (hypertension)  HLD (hyperlipidemia)  Ganglion cyst of wrist  Right knee pain  Vitamin D deficiency  Depression  Need for shingles vaccine  Tobacco use  Obesity, [facility], 03/11/21 11:15:00 CST  CBC w/ Auto Diff, Routine collect, *Est. 06/11/21 3:00:00 CDT, Blood, Order for future visit, *Est. Stop date 06/11/21 3:00:00 CDT, Lab Collect, HLD (hyperlipidemia)  HTN (hypertension), 03/11/21 11:15:00 CST  Clinic Follow up, *Est. 06/11/21 3:00:00 CDT, Order for future visit, HTN (hypertension)  HLD (hyperlipidemia)  Right knee pain  Ganglion cyst of wrist  Depression  Tobacco use  Obesity, [facility]  Comprehensive  Metabolic Panel, Routine collect, *Est. 06/11/21 3:00:00 CDT, Blood, Order for future visit, *Est. Stop date 06/11/21 3:00:00 CDT, Lab Collect, HLD (hyperlipidemia)  HTN (hypertension), 03/11/21 11:15:00 CST  Lipid Panel, Routine collect, *Est. 06/11/21 3:00:00 CDT, Blood, Order for future visit, *Est. Stop date 06/11/21 3:00:00 CDT, Lab Collect, HLD (hyperlipidemia)  HTN (hypertension), 03/11/21 11:15:00 CST  Office/Outpatient Visit Level 4 Established 38702 PC, HTN (hypertension)  HLD (hyperlipidemia)  Ganglion cyst of wrist  Right knee pain  Vitamin D deficiency  Depression  Need for shingles vaccine  Tobacco use  Obesity, [facility], 03/11/21 11:15:00 CST  ?  HTN (hypertension)?I10  BP elevated, start on losartan 25 daily.  IM BP check 2 wks.  DASH diet: Eat more fruits, vegetables, and low fat dairy foods. Low sodium diet.  Ordered:  1160F- Medication reconciliation completed during visit, HTN (hypertension)  HLD (hyperlipidemia)  Ganglion cyst of wrist  Right knee pain  Vitamin D deficiency  Depression  Need for shingles vaccine  Tobacco use  Obesity, [facility], 03/11/21 11:15:00 CST  CBC w/ Auto Diff, Routine collect, *Est. 06/11/21 3:00:00 CDT, Blood, Order for future visit, *Est. Stop date 06/11/21 3:00:00 CDT, Lab Collect, HLD (hyperlipidemia)  HTN (hypertension), 03/11/21 11:15:00 CST  Clinic Follow up, *Est. 03/25/21 3:00:00 CDT, bp check, Order for future visit, HTN (hypertension), [facility]  Clinic Follow up, *Est. 06/11/21 3:00:00 CDT, Order for future visit, HTN (hypertension)  HLD (hyperlipidemia)  Right knee pain  Ganglion cyst of wrist  Depression  Tobacco use  Obesity, [facility]  Comprehensive Metabolic Panel, Routine collect, *Est. 06/11/21 3:00:00 CDT, Blood, Order for future visit, *Est. Stop date 06/11/21 3:00:00 CDT, Lab Collect, HLD (hyperlipidemia)  HTN (hypertension), 03/11/21 11:15:00 CST  Lipid Panel, Routine collect, *Est. 06/11/21 3:00:00 CDT,  Blood, Order for future visit, *Est. Stop date 06/11/21 3:00:00 CDT, Lab Collect, HLD (hyperlipidemia)  HTN (hypertension), 03/11/21 11:15:00 CST  Office/Outpatient Visit Level 4 Established 42007 PC, HTN (hypertension)  HLD (hyperlipidemia)  Ganglion cyst of wrist  Right knee pain  Vitamin D deficiency  Depression  Need for shingles vaccine  Tobacco use  [facility], [facility], 03/11/21 11:15:00 CST  Urinalysis with Microscopic if Indicated, Routine collect, Urine, Order for future visit, *Est. 06/11/21 3:00:00 CDT, *Est. Stop date 06/11/21 3:00:00 CDT, Nurse collect, HTN (hypertension)  ?  Need for shingles vaccine?Z23  ?2nd shot administered today  Ordered:  zoster vaccine, inactivated, 0.5 mL, form: Powder-Inj, IM, Once-Unscheduled, first dose 03/11/21 11:12:00 CST  1160F- Medication reconciliation completed during visit, HTN (hypertension)  HLD (hyperlipidemia)  Ganglion cyst of wrist  Right knee pain  Vitamin D deficiency  Depression  Need for shingles vaccine  Tobacco use  [facility], [facility], 03/11/21 11:15:00 CST  Office/Outpatient Visit Level 4 Established 60979 PC, HTN (hypertension)  HLD (hyperlipidemia)  Ganglion cyst of wrist  Right knee pain  Vitamin D deficiency  Depression  Need for shingles vaccine  Tobacco use  [facility], [facility], 03/11/21 11:15:00 CST  ?  Obesity?E66.9  ?Low fat diet and 150 minutes?of aerobic exercise per week  Ordered:  1160F- Medication reconciliation completed during visit, HTN (hypertension)  HLD (hyperlipidemia)  Ganglion cyst of wrist  Right knee pain  Vitamin D deficiency  Depression  Need for shingles vaccine  Tobacco use  [facility], [facility], 03/11/21 11:15:00 CST  Clinic Follow up, *Est. 06/11/21 3:00:00 CDT, Order for future visit, HTN (hypertension)  HLD (hyperlipidemia)  Right knee pain  Ganglion cyst of wrist  Depression  Tobacco use  Obesity, [facility]  Office/Outpatient Visit Level 4 Established 33813 PC, HTN  (hypertension)  HLD (hyperlipidemia)  Ganglion cyst of wrist  Right knee pain  Vitamin D deficiency  Depression  Need for shingles vaccine  Tobacco use  Obesity, [facility], 03/11/21 11:15:00 CST  ?  Right knee pain?M25.561  XR right knee  HEP  mobic 7.5 daily  Ordered:  1160F- Medication reconciliation completed during visit, HTN (hypertension)  HLD (hyperlipidemia)  Ganglion cyst of wrist  Right knee pain  Vitamin D deficiency  Depression  Need for shingles vaccine  Tobacco use  Obesity, [facility], 03/11/21 11:15:00 CST  Clinic Follow up, *Est. 06/11/21 3:00:00 CDT, Order for future visit, HTN (hypertension)  HLD (hyperlipidemia)  Right knee pain  Ganglion cyst of wrist  Depression  Tobacco use  Obesity, [facility]  Office/Outpatient Visit Level 4 Established 36266 PC, HTN (hypertension)  HLD (hyperlipidemia)  Ganglion cyst of wrist  Right knee pain  Vitamin D deficiency  Depression  Need for shingles vaccine  Tobacco use  Obesity, [facility], 03/11/21 11:15:00 CST  XR Knee Right 4 or More Views, Routine, *Est. 03/11/21 3:00:00 CST, None, Ambulatory, Rad Type, Order for future visit, Right knee pain, Not Scheduled, *Est. 03/11/21 3:00:00 CST, Not Scheduled  ?  Tobacco use?Z72.0  ?Smoking cessation?advised.?Instructed on smoking cessation program through [facility] and pharmacological interventions to aid in cessation. Not ready to quit.  Ordered:  1160F- Medication reconciliation completed during visit, HTN (hypertension)  HLD (hyperlipidemia)  Ganglion cyst of wrist  Right knee pain  Vitamin D deficiency  Depression  Need for shingles vaccine  Tobacco use  Obesity, [facility], 03/11/21 11:15:00 CST  Clinic Follow up, *Est. 06/11/21 3:00:00 CDT, Order for future visit, HTN (hypertension)  HLD (hyperlipidemia)  Right knee pain  Ganglion cyst of wrist  Depression  Tobacco use  Obesity, [facility]  Office/Outpatient Visit Level 4 Established 19773 PC, HTN  (hypertension)  HLD (hyperlipidemia)  Ganglion cyst of wrist  Right knee pain  Vitamin D deficiency  Depression  Need for shingles vaccine  Tobacco use  Obesity, [facility], 03/11/21 11:15:00 CST  ?  Vitamin D deficiency?E55.9  ?OTC vit d  Ordered:  1160F- Medication reconciliation completed during visit, HTN (hypertension)  HLD (hyperlipidemia)  Ganglion cyst of wrist  Right knee pain  Vitamin D deficiency  Depression  Need for shingles vaccine  Tobacco use  Obesity, [facility], 03/11/21 11:15:00 CST  Office/Outpatient Visit Level 4 Established 58170 PC, HTN (hypertension)  HLD (hyperlipidemia)  Ganglion cyst of wrist  Right knee pain  Vitamin D deficiency  Depression  Need for shingles vaccine  Tobacco use  Obesity, [facility], 03/11/21 11:15:00 CST  Vitamin D, 25-Hydroxy Level, Routine collect, *Est. 06/11/21 3:00:00 CDT, Blood, Order for future visit, *Est. Stop date 06/11/21 3:00:00 CDT, Lab Collect, Vitamin D deficiency, 03/11/21 11:15:00 CST  ?  Orders:  losartan, 25 mg = 1 tab(s), Oral, Daily, # 90 tab(s), 1 Refill(s), Pharmacy: [facility], 150, cm, Height/Length Dosing, 03/11/21 10:15:00 CST, 77.8, kg, Weight Dosing, 03/11/21 10:15:00 CST  meloxicam, 7.5 mg = 1 tab(s), Oral, Daily, with food, # 90 tab(s), 1 Refill(s), Pharmacy: [facility], 150, cm, Height/Length Dosing, 03/11/21 10:15:00 CST, 77.8, kg, Weight Dosing, 03/11/21 10:15:00 CST  simvastatin, See Instructions, TAKE 1 TABLET BY MOUTH AT BEDTIME, # 90 tab(s), 1 Refill(s), Pharmacy: [facility], 150, cm, Height/Length Dosing, 03/11/21 10:15:00 CST, 77.8, kg, Weight Dosing, 03/11/21 10:15:00 CST  venlafaxine, 37.5 mg = 1 cap(s), Oral, Daily, take in addition to the 75mg capsule for a total of 112.5mg daily, # 90 cap(s), 1 Refill(s), Pharmacy: [facility], 150, cm, Height/Length Dosing, 03/11/21 10:15:00 CST, 77.8, kg, Weight Dosing,  ..  venlafaxine, 75 mg = 1 cap(s), Oral, Daily, # 90 cap(s), 1 Refill(s), Pharmacy: [facility], 150, cm, Height/Length Dosing, 21 10:15:00 CST, 77.8, kg, Weight Dosing, 21 10:15:00 CST  US NIVA Venous Reflux Lower Ext Right, *Est. 21 3:00:00 CST, *Est. Stop date 21 3:00:00 CST, Ambulatory, Order for future visit, [facility], Right leg pain  Pt concerned that she may have DVT, will order US.  Referrals  Clinic Follow up, *Est. 06/10/21 12:00:00 CDT, Order for future visit, Obesity, [facility]  Clinic Follow up, *Est. 21 3:00:00 CDT, bp check, Order for future visit, HTN (hypertension), [facility]  Labs thoroughly reviewed with patient. Medication refills addressed today.  RTC prn and 3 months, with labs 1 week prior to the apt.  COVID 19 precautions given to patient.  Patient voices understanding of all discharge instructions.?   Problem List/Past Medical History  Ongoing  Depression  HLD (hyperlipidemia)  HTN (hypertension)  Obesity  Tobacco user  Historical  Cholesterol  Corn or callus  Foot callus  Pregnant  Pregnant  Procedure/Surgical History   delivery (1984)   delivery (1980)  Appendectomy  caserean section  Hernia of abdominal wall  Tubal ligation   Medications  Fluzone PF Quadrivalent [id number] intramuscular suspension, 0.5 mL, IM, Once,? ?Not Taking, Completed Rx  simvastatin 40 mg oral tablet, See Instructions, 1 refills  venlafaxine 37.5 mg oral capsule, extended release, 37.5 mg= 1 cap(s), Oral, Daily, 1 refills  venlafaxine 75 mg oral capsule, extended release, 75 mg= 1 cap(s), Oral, Daily, 1 refills  Allergies  lisinopril  Social History  Abuse/Neglect  No, No, Yes, 2021  Alcohol - Denies Alcohol Use, 07/10/2014  Past, 2020  Employment/School  Employed, Work/School description: pressure . Activity level: Moderate physical work., 2015  Exercise  Self assessment: Fair  condition., 07/12/2016  Home/Environment  Lives with Alone. Living situation: Home/Independent. Alcohol abuse in household: No. Substance abuse in household: No. Smoker in household: Yes. Injuries/Abuse/Neglect in household: No. Feels unsafe at home: No. Family/Friends available for support: Yes. Concern for family members at home: No. Major illness in household: No. Financial concerns: No. TV/Computer concerns: No., 09/07/2016  Nutrition/Health  Regular, Caffeine intake amount: 4 CUPS COFFEE A DAY. Sleeping concerns: Yes. Feels highly stressed: No., 07/12/2016  Sexual  Sexually active: No., 09/04/2019  Gender Identity Identifies as female., 04/05/2019  Spiritual/Cultural  Jehovah's witness, 12/05/2020  Substance Use - Denies Substance Abuse, 07/10/2014  Never, 06/15/2016  Tobacco  10 or more cigarettes (1/2 pack or more)/day in last 30 days, Cigarettes, No, 10 per day., 03/11/2021  Family History  Acute myocardial infarction.: Father.  Hypertension.: Mother and Father.  Immunizations  Vaccine Date Status   zoster vaccine, inactivated 12/05/2020 Given   influenza virus vaccine, inactivated 11/25/2020 Recorded   influenza virus vaccine, inactivated 10/24/2019 Given   influenza virus vaccine, inactivated 12/18/2018 Given   influenza virus vaccine, inactivated 11/03/2016 Given   Health Maintenance  Health Maintenance  ???Pending?(in the next year)  ??? ??OverDue  ??? ? ? ?Aspirin Therapy for CVD Prevention due??12/18/19??and every 1??year(s)  ??? ? ? ?Smoking Cessation due??01/01/21??and every 1??year(s)  ??? ? ? ?Alcohol Misuse Screening due??01/02/21??and every 1??year(s)  ??? ??Due?  ??? ? ? ?Lung Cancer Screening due??03/11/21??and every 1??year(s)  ??? ? ? ?Zoster Vaccine due??03/11/21??Unknown Frequency  ??? ??Refused?  ??? ? ? ?Tetanus Vaccine due??03/11/21??and every 10??year(s)  ??? ??Due In Future?  ??? ? ? ?Influenza Vaccine not due until??10/01/21??and every 1??day(s)  ??? ? ? ?Colorectal Screening not due  until??12/08/21??and every 1??year(s)  ??? ? ? ?Obesity Screening not due until??01/01/22??and every 1??year(s)  ??? ? ? ?Diabetes Screening not due until??03/10/22??and every 1??year(s)  ??? ? ? ?Hypertension Management-BMP not due until??03/10/22??and every 1??year(s)  ???Satisfied?(in the past 1 year)  ??? ??Satisfied?  ??? ? ? ?ADL Screening on??03/11/21.??Satisfied by Brandangt OSUNA, Rahel Heather  ??? ? ? ?Alcohol Misuse Screening on??12/05/20.??Satisfied by Jamia Alan LPN  ??? ? ? ?Blood Pressure Screening on??03/11/21.??Satisfied by Brandangt OSUNA Rahel Heather  ??? ? ? ?Body Mass Index Check on??03/11/21.??Satisfied by Brandantg OSUNA, Rahel Heather  ??? ? ? ?Breast Cancer Screening on??02/18/21.??Satisfied by Kelly Cameron  ??? ? ? ?Colorectal Screening on??12/08/20.??Satisfied by Mima Augustin  ??? ? ? ?Depression Screening on??03/11/21.??Satisfied by Brandan THAO, Rahel Heather  ??? ? ? ?Diabetes Screening on??03/10/21.??Satisfied by Ascencion Agee  ??? ? ? ?Hypertension Management-Blood Pressure on??03/11/21.??Satisfied by Brandan THAO, Rahel Heather  ??? ? ? ?Influenza Vaccine on??03/11/21.  ??? ? ? ?Lipid Screening on??03/10/21.??Satisfied by Ascencion Agee  ??? ? ? ?Obesity Screening on??03/11/21.??Satisfied by Brandan LPN, Rahel Heather  ??? ? ? ?Zoster Vaccine on??12/05/20.??Satisfied by Jamia Alan LPN  ??? ??Refused?  ??? ? ? ?Tetanus Vaccine on??12/05/20.??Recorded by Jamia Alan LPN??Reason: Patient Refuses  ??? ? ? ?Zoster Vaccine on??12/05/20.??Recorded by Jamia Alan LPN??Reason: Patient Refuses  ?

## 2022-05-05 NOTE — HISTORICAL [facility] CERNER
This is a historical note converted from [employer]. Formatting and pictures may have been removed.  Please reference [employer] for original formatting and attached multimedia. Chief Complaint  RESUME CARE,HX OF HLD,AND DEPRESSION.RIGHT KNEE PAIN AND NUMBNESS OFF AND ON FELL TWICE ON KNEE LAST YEAR  History of Present Illness  58 year old white female, here for initial visit. Previously seen by Dr. Rayo. PMH HLD, HTN, tobacco user, depression, chronic right knee pain. States GYN clinic recently increased her Effexor. Taking Effexor 75mg and 37.5mg daily x 1 wks. States too soon to tell a difference in her mood but agrees to continue taking. She is c/o right knee pain with intermittent swelling x 1 yr. Reports two falls to Rt knee over the past yr. XR right knee in 11/2018 was negative. MRI was ordered by Dr. Rayo and was denied. She is taking IBU 800mg otc twice a day at least twice a week for the pain. Denies chest pain, shortness of breath,?cough, fever, headache,?dizziness, weakness, abdominal pain, nausea,?vomiting, diarrhea, constipation, dysuria,?SI, HI,?anxiety.  Review of Systems  Constitutional: negative except as stated in HPI  Eye: negative except as stated in HPI  ENMT: negative except as stated in HPI  Respiratory: negative except as stated in HPI  Cardiovascular: negative except as stated in HPI  Gastrointestinal: negative except as stated in HPI  Genitourinary: negative except as stated in HPI  Hema/Lymph: negative except as stated in HPI  Endocrine: negative except as stated in HPI  Immunologic: negative except as stated in HPI  Musculoskeletal: negative except as stated in HPI  Integumentary: negative except as stated in HPI  Neurologic: negative except as stated in HPI  ?   All Other ROS_ ?negative except as stated in HPI  Physical Exam  Vitals & Measurements  T:?36.5? ?C (Oral)? HR:?60(Peripheral)? RR:?16? BP:?130/84?  HT:?150?cm? HT:?150?cm? WT:?74.6?kg? WT:?74.6?kg? BMI:?33.16?  General: Alert and  oriented, No acute distress.  Eye: Pupils are equal, round and reactive to light, Extraocular movements are intact, Normal conjunctiva.  HENT: Normocephalic, Normal hearing, Oral mucosa is moist, No pharyngeal erythema.  Neck: Supple, Non-tender, No lymphadenopathy, No thyromegaly.  Respiratory: Lungs are clear to auscultation, Respirations are non-labored, Breath sounds are equal, Symmetrical chest wall expansion.  Cardiovascular: Normal rate, Regular rhythm, No murmur, Normal peripheral perfusion, No edema.  Gastrointestinal: Soft, Non-tender, Non-distended, Normal bowel sounds, No organomegaly.  Genitourinary: No costovertebral angle tenderness, No inguinal tenderness.  Lymphatics: No lymphadenopathy neck, axilla, groin.  Musculoskeletal: Normal range of motion, Normal strength, No tenderness, Normal gait.  Neurologic: No focal deficits, Cranial Nerves II-XII are grossly intact.  Integumentary: Warm, Dry, Intact.  Feet: 2+ pedal pulses bilaterally.  ?  Assessment/Plan  Colon cancer screening?Z12.11  ?FIT  Ordered:  Clinic Follow up, *Est. 04/18/19 3:00:00 CDT, Order for future visit, HTN (hypertension)  HLD (hyperlipidemia)  Tobacco user  Obesity  Depression  Colon cancer screening  Right knee pain, [facility]  Office/Outpatient Visit Level 4 Established 94195 PC, HLD (hyperlipidemia)  HTN (hypertension)  Depression  Right knee pain  Obesity  Tobacco user  Colon cancer screening, [facility], 12/18/18 9:53:00 CST  ?  Depression?F32.9  ?Continue Effexor. ED precautions for SI/HI, worsening of mood  Ordered:  Clinic Follow up, *Est. 04/18/19 3:00:00 CDT, Order for future visit, HTN (hypertension)  HLD (hyperlipidemia)  Tobacco user  Obesity  Depression  Colon cancer screening  Right knee pain, [facility]  Office/Outpatient Visit Level 4 Established 93127 PC, HLD (hyperlipidemia)  HTN (hypertension)  Depression  Right knee pain  Obesity  Tobacco user  Colon cancer screening, [facility], 12/18/18 9:53:00 CST  ?  HLD (hyperlipidemia)?E78.5  ?Rx simvastatin refilled. CHOL controlled. Low fat diet; more whole grain products, fruits, vegetables, lean meats, fish, and poultry.  Ordered:  Clinic Follow up, *Est. 04/18/19 3:00:00 CDT, Order for future visit, HTN (hypertension)  HLD (hyperlipidemia)  Tobacco user  Obesity  Depression  Colon cancer screening  Right knee pain, [facility]  Office/Outpatient Visit Level 4 Established 68811 PC, HLD (hyperlipidemia)  HTN (hypertension)  Depression  Right knee pain  Obesity  Tobacco user  Colon cancer screening, [facility], 12/18/18 9:53:00 CST  ?  HTN (hypertension)?I10  ?stable, off hctz >1 month and is normal. will continue to monitor. DASH diet: Eat more fruits, vegetables, and low fat dairy foods. Low sodium diet.  Ordered:  Clinic Follow up, *Est. 04/18/19 3:00:00 CDT, Order for future visit, HTN (hypertension)  HLD (hyperlipidemia)  Tobacco user  Obesity  Depression  Colon cancer screening  Right knee pain, [facility]  Office/Outpatient Visit Level 4 Established 07299 PC, HLD (hyperlipidemia)  HTN (hypertension)  Depression  Right knee pain  Obesity  Tobacco user  Colon cancer screening, [facility], 12/18/18 9:53:00 CST  ?  Need for influenza vaccination?Z23  ?given  Ordered:  Influenza Virus Vaccine, Inactivated, 0.5 mL, form: Susp, IM, Once-Unscheduled, first dose 12/18/18 10:02:00 CST  ?  Obesity?E66.9  ?Low fat diet and 150 minutes?of aerobic exercise per week  Ordered:  Clinic Follow up, *Est. 04/18/19 3:00:00 CDT, Order for future visit, HTN (hypertension)  HLD (hyperlipidemia)  Tobacco user  Obesity  Depression  Colon cancer screening  Right knee pain, [facility]  Office/Outpatient Visit Level 4 Established 74673 PC, HLD (hyperlipidemia)  HTN (hypertension)  Depression  Right knee pain  Obesity  Tobacco user  Colon cancer screening, [facility], 12/18/18 9:53:00 CST  ?  Right knee  pain?M25.561  ?XR knee today. Pt is inquiring about an US, to rule out a clot. NO swelling, heat or erythema noted to area. mild ttp?rt anterior knee. She was advised to take ASA 81mg daily. ICE, Topical capsaicin, HEP. Diclofenac bid, with food, Do not take with other NSAIDS.  Ordered:  Clinic Follow up, *Est. 04/18/19 3:00:00 CDT, Order for future visit, HTN (hypertension)  HLD (hyperlipidemia)  Tobacco user  Obesity  Depression  Colon cancer screening  Right knee pain, [facility]  Office/Outpatient Visit Level 4 Established 72552 PC, HLD (hyperlipidemia)  HTN (hypertension)  Depression  Right knee pain  Obesity  Tobacco user  Colon cancer screening, [facility], 12/18/18 9:53:00 CST  XR Knee Right 4 or More Views, Routine, *Est. 12/18/18 3:00:00 CST, Pain, None, Ambulatory, Rad Type, Order for future visit, Right knee pain, Not Scheduled, *Est. 12/18/18 3:00:00 CST  ?  Tobacco user?Z72.0  ?smoking cessation advised, not ready  Ordered:  Clinic Follow up, *Est. 04/18/19 3:00:00 CDT, Order for future visit, HTN (hypertension)  HLD (hyperlipidemia)  Tobacco user  Obesity  Depression  Colon cancer screening  Right knee pain, [facility]  Office/Outpatient Visit Level 4 Established 52206 PC, HLD (hyperlipidemia)  HTN (hypertension)  Depression  Right knee pain  Obesity  Tobacco user  Colon cancer screening, [facility], 12/18/18 9:53:00 CST  ?  Orders:  capsaicin topical, 1 norris, TOP, BID, PRN PRN pain, # 60 gm, 1 Refill(s), Pharmacy: [facility]  diclofenac, 75 mg = 1 tab(s), Oral, BID, PRN PRN as needed for pain, with food, # 60 tab(s), 1 Refill(s), Pharmacy: [facility]  simvastatin, 40 mg = 1 tab(s), Oral, Once a day (at bedtime), # 90 tab(s), 0 Refill(s), Pharmacy: [facility]  Labs reviewed. RTC prn and 3-4 months.   Problem List/Past Medical History  Ongoing  Depression  HLD (hyperlipidemia)  HTN (hypertension)  Obesity  Tobacco  user  Historical  Cholesterol  Corn or callus  Foot callus  Procedure/Surgical History   delivery (1984)   delivery (1980)  Appendectomy  caserean section  Hernia of abdominal wall  Tubal ligation   Medications  baclofen 10 mg oral tablet, 10 mg= 1 tab(s), Oral, TID, PRN,? ?Not taking: Last Dose Date/Time Unknown  hydroCHLOROthiazide 12.5 mg oral capsule, 12.5 mg= 1 cap(s), Oral, Daily, 3 refills,? ?Not taking: Last Dose Date/Time Unknown  indomethacin 25 mg oral capsule, 25 mg= 1 cap(s), Oral, TID, PRN,? ?Not taking: Last Dose Date/Time Unknown  simvastatin 40 mg oral tablet, 40 mg= 1 tab(s), Oral, Once a day (at bedtime), 3 refills  venlafaxine 37.5 mg oral capsule, extended release, 37.5 mg= 1 cap(s), Oral, Daily, 2 refills  venlafaxine 75 mg oral capsule, extended release, 75 mg= 1 cap(s), Oral, Daily, 3 refills  venlafaxine 75 mg oral capsule, extended release, See Instructions,? ?Not taking: DUPLICATE  Allergies  lisinopril  Social History  Alcohol - Denies Alcohol Use, 07/10/2014  Never, 06/15/2016  Employment/School  Employed, Work/School description: pressure . Activity level: Moderate physical work., 2015  Exercise  Self assessment: Fair condition., 2016  Home/Environment  Lives with Alone. Living situation: Home/Independent. Alcohol abuse in household: No. Substance abuse in household: No. Smoker in household: Yes. Injuries/Abuse/Neglect in household: No. Feels unsafe at home: No. Family/Friends available for support: Yes. Concern for family members at home: No. Major illness in household: No. Financial concerns: No. TV/Computer concerns: No., 2016  Nutrition/Health  Regular, Caffeine intake amount: 4 CUPS COFFEE A DAY. Sleeping concerns: Yes. Feels highly stressed: No., 2016  Substance Abuse - Denies Substance Abuse, 07/10/2014  Never, 06/15/2016  Tobacco  Current every day smoker, Cigarettes, No, 10 per day. Started age 12 Years.,  12/18/2018  Family History  Acute myocardial infarction.: Father.  Hypertension.: Mother and Father.  Immunizations  Vaccine Date Status   influenza virus vaccine, inactivated 11/03/2016 Given   Health Maintenance  Health Maintenance  ???Pending?(in the next year)  ??? ??OverDue  ??? ? ? ?Diabetes Screening due??and every?  ??? ??Due?  ??? ? ? ?ADL Screening due??12/18/18??and every 1??year(s)  ??? ? ? ?Colorectal Screening due??12/18/18??and every?  ??? ? ? ?Influenza Vaccine due??12/18/18??and every?  ??? ? ? ?Lung Cancer Screening due??12/18/18??and every 1??year(s)  ??? ? ? ?Tetanus Vaccine due??12/18/18??and every 10??year(s)  ??? ??Due In Future?  ??? ? ? ?Blood Pressure Screening not due until??05/16/19??and every 1??year(s)  ??? ? ? ?Body Mass Index Check not due until??05/16/19??and every 1??year(s)  ??? ? ? ?Hypertension Management-Blood Pressure not due until??05/16/19??and every 1??year(s)  ??? ? ? ?Obesity Screening not due until??05/16/19??and every 1??year(s)  ??? ? ? ?Smoking Cessation not due until??05/16/19??and every 1??year(s)  ???Satisfied?(in the past 1 year)  ??? ??Satisfied?  ??? ? ? ?Alcohol Misuse Screening on??12/18/18.??Satisfied by Swapna Wilburn  ??? ? ? ?Aspirin Therapy for CVD Prevention on??12/18/18.??Satisfied by Swapna Wilburn??Reason: Expectation Satisfied Elsewhere  ??? ? ? ?Blood Pressure Screening on??05/16/18.??Satisfied by Zeke Vázquez LPN  ??? ? ? ?Body Mass Index Check on??05/16/18.??Satisfied by Zeke Vázquez LPN.  ??? ? ? ?Breast Cancer Screening on??09/11/18.??Satisfied by Christie Carter  ??? ? ? ?Depression Screening on??05/16/18.??Satisfied by Zeke Vázquez LPN.  ??? ? ? ?Diabetes Screening on??12/18/18.??Satisfied by Nemo Mueller  ??? ? ? ?Hypertension Management-BMP on??12/18/18.??Satisfied by Nemo Mueller  ??? ? ? ?Lipid Screening on??12/18/18.??Satisfied by Nemo Mueller  ??? ? ? ?Obesity Screening on??05/16/18.??Satisfied  by Zeke Vázquez LPN  ??? ? ? ?Smoking Cessation on??05/16/18.??Satisfied by Zeke Vázquez LPN  ??? ? ? ?Smoking Cessation (Coronary Artery Disease) on??05/16/18.??Satisfied by Zeke Vázquez LPN  ?  ?

## 2022-07-14 ENCOUNTER — LAB VISIT (OUTPATIENT)
Dept: LAB | Facility: HOSPITAL | Age: 62
End: 2022-07-14
Payer: MEDICAID

## 2022-07-14 DIAGNOSIS — I10 HYPERTENSION, UNSPECIFIED TYPE: Primary | ICD-10-CM

## 2022-07-14 DIAGNOSIS — E78.5 HYPERLIPIDEMIA, UNSPECIFIED HYPERLIPIDEMIA TYPE: ICD-10-CM

## 2022-07-14 LAB
ALBUMIN SERPL-MCNC: 3.7 GM/DL (ref 3.4–4.8)
ALBUMIN/GLOB SERPL: 1.2 RATIO (ref 1.1–2)
ALP SERPL-CCNC: 86 UNIT/L (ref 40–150)
ALT SERPL-CCNC: 30 UNIT/L (ref 0–55)
APPEARANCE UR: CLEAR
AST SERPL-CCNC: 24 UNIT/L (ref 5–34)
BACTERIA #/AREA URNS AUTO: ABNORMAL /HPF
BASOPHILS # BLD AUTO: 0.02 X10(3)/MCL (ref 0–0.2)
BASOPHILS NFR BLD AUTO: 0.3 %
BILIRUB UR QL STRIP.AUTO: NEGATIVE MG/DL
BILIRUBIN DIRECT+TOT PNL SERPL-MCNC: 0.2 MG/DL
BUN SERPL-MCNC: 12.9 MG/DL (ref 9.8–20.1)
CALCIUM SERPL-MCNC: 9.5 MG/DL (ref 8.4–10.2)
CHLORIDE SERPL-SCNC: 106 MMOL/L (ref 98–107)
CHOLEST SERPL-MCNC: 181 MG/DL
CHOLEST/HDLC SERPL: 4 {RATIO} (ref 0–5)
CO2 SERPL-SCNC: 31 MMOL/L (ref 23–31)
COLOR UR AUTO: ABNORMAL
CREAT SERPL-MCNC: 0.59 MG/DL (ref 0.55–1.02)
DEPRECATED CALCIDIOL+CALCIFEROL SERPL-MC: 44.5 NG/ML (ref 30–80)
EOSINOPHIL # BLD AUTO: 0.21 X10(3)/MCL (ref 0–0.9)
EOSINOPHIL NFR BLD AUTO: 3.2 %
ERYTHROCYTE [DISTWIDTH] IN BLOOD BY AUTOMATED COUNT: 13.1 % (ref 11.5–17)
GLOBULIN SER-MCNC: 3.2 GM/DL (ref 2.4–3.5)
GLUCOSE SERPL-MCNC: 106 MG/DL (ref 82–115)
GLUCOSE UR QL STRIP.AUTO: NORMAL MG/DL
HCT VFR BLD AUTO: 41.2 % (ref 37–47)
HDLC SERPL-MCNC: 42 MG/DL (ref 35–60)
HGB BLD-MCNC: 13.3 GM/DL (ref 12–16)
HYALINE CASTS #/AREA URNS LPF: ABNORMAL /LPF
IMM GRANULOCYTES # BLD AUTO: 0.02 X10(3)/MCL (ref 0–0.04)
IMM GRANULOCYTES NFR BLD AUTO: 0.3 %
KETONES UR QL STRIP.AUTO: NEGATIVE MG/DL
LDLC SERPL CALC-MCNC: 109 MG/DL (ref 50–140)
LEUKOCYTE ESTERASE UR QL STRIP.AUTO: NEGATIVE UNIT/L
LYMPHOCYTES # BLD AUTO: 1.41 X10(3)/MCL (ref 0.6–4.6)
LYMPHOCYTES NFR BLD AUTO: 21.4 %
MCH RBC QN AUTO: 31.9 PG (ref 27–31)
MCHC RBC AUTO-ENTMCNC: 32.3 MG/DL (ref 33–36)
MCV RBC AUTO: 98.8 FL (ref 80–94)
MONOCYTES # BLD AUTO: 0.51 X10(3)/MCL (ref 0.1–1.3)
MONOCYTES NFR BLD AUTO: 7.7 %
MUCOUS THREADS URNS QL MICRO: ABNORMAL /LPF
NEUTROPHILS # BLD AUTO: 4.4 X10(3)/MCL (ref 2.1–9.2)
NEUTROPHILS NFR BLD AUTO: 67.1 %
NITRITE UR QL STRIP.AUTO: NEGATIVE
NRBC BLD AUTO-RTO: 0 %
PH UR STRIP.AUTO: 5 [PH]
PLATELET # BLD AUTO: 160 X10(3)/MCL (ref 130–400)
PMV BLD AUTO: 13.3 FL (ref 7.4–10.4)
POTASSIUM SERPL-SCNC: 4 MMOL/L (ref 3.5–5.1)
PROT SERPL-MCNC: 6.9 GM/DL (ref 5.8–7.6)
PROT UR QL STRIP.AUTO: NEGATIVE MG/DL
RBC # BLD AUTO: 4.17 X10(6)/MCL (ref 4.2–5.4)
RBC #/AREA URNS AUTO: ABNORMAL /HPF
RBC UR QL AUTO: NEGATIVE UNIT/L
SODIUM SERPL-SCNC: 144 MMOL/L (ref 136–145)
SP GR UR STRIP.AUTO: 1.02
SQUAMOUS #/AREA URNS LPF: ABNORMAL /HPF
TRIGL SERPL-MCNC: 152 MG/DL (ref 37–140)
TSH SERPL-ACNC: 1.12 UIU/ML (ref 0.35–4.94)
UROBILINOGEN UR STRIP-ACNC: NORMAL MG/DL
VLDLC SERPL CALC-MCNC: 30 MG/DL
WBC # SPEC AUTO: 6.6 X10(3)/MCL (ref 4.5–11.5)
WBC #/AREA URNS AUTO: ABNORMAL /HPF

## 2022-07-14 PROCEDURE — 36415 COLL VENOUS BLD VENIPUNCTURE: CPT

## 2022-07-14 PROCEDURE — 84443 ASSAY THYROID STIM HORMONE: CPT

## 2022-07-14 PROCEDURE — 80061 LIPID PANEL: CPT

## 2022-07-14 PROCEDURE — 82306 VITAMIN D 25 HYDROXY: CPT

## 2022-07-14 PROCEDURE — 80053 COMPREHEN METABOLIC PANEL: CPT

## 2022-07-14 PROCEDURE — 81001 URINALYSIS AUTO W/SCOPE: CPT

## 2022-07-14 PROCEDURE — 85025 COMPLETE CBC W/AUTO DIFF WBC: CPT

## 2022-07-15 ENCOUNTER — TELEPHONE (OUTPATIENT)
Dept: INTERNAL MEDICINE | Facility: CLINIC | Age: 62
End: 2022-07-15

## 2022-07-15 ENCOUNTER — OFFICE VISIT (OUTPATIENT)
Dept: INTERNAL MEDICINE | Facility: CLINIC | Age: 62
End: 2022-07-15
Attending: NURSE PRACTITIONER
Payer: MEDICAID

## 2022-07-15 DIAGNOSIS — E78.5 HYPERLIPIDEMIA, UNSPECIFIED HYPERLIPIDEMIA TYPE: ICD-10-CM

## 2022-07-15 DIAGNOSIS — M54.41 CHRONIC RIGHT-SIDED LOW BACK PAIN WITH RIGHT-SIDED SCIATICA: ICD-10-CM

## 2022-07-15 DIAGNOSIS — F32.A DEPRESSION, UNSPECIFIED DEPRESSION TYPE: ICD-10-CM

## 2022-07-15 DIAGNOSIS — G89.29 CHRONIC RIGHT-SIDED LOW BACK PAIN WITH RIGHT-SIDED SCIATICA: ICD-10-CM

## 2022-07-15 DIAGNOSIS — E55.9 VITAMIN D DEFICIENCY: Primary | ICD-10-CM

## 2022-07-15 DIAGNOSIS — Z12.11 COLON CANCER SCREENING: ICD-10-CM

## 2022-07-15 DIAGNOSIS — E66.9 OBESITY, UNSPECIFIED CLASSIFICATION, UNSPECIFIED OBESITY TYPE, UNSPECIFIED WHETHER SERIOUS COMORBIDITY PRESENT: ICD-10-CM

## 2022-07-15 DIAGNOSIS — Z72.0 TOBACCO USER: ICD-10-CM

## 2022-07-15 PROCEDURE — 1159F PR MEDICATION LIST DOCUMENTED IN MEDICAL RECORD: ICD-10-PCS | Mod: CPTII,95,, | Performed by: NURSE PRACTITIONER

## 2022-07-15 PROCEDURE — 1159F MED LIST DOCD IN RCRD: CPT | Mod: CPTII,95,, | Performed by: NURSE PRACTITIONER

## 2022-07-15 PROCEDURE — 1160F RVW MEDS BY RX/DR IN RCRD: CPT | Mod: CPTII,95,, | Performed by: NURSE PRACTITIONER

## 2022-07-15 PROCEDURE — 99214 OFFICE O/P EST MOD 30 MIN: CPT | Mod: 95,,, | Performed by: NURSE PRACTITIONER

## 2022-07-15 PROCEDURE — 99214 PR OFFICE/OUTPT VISIT, EST, LEVL IV, 30-39 MIN: ICD-10-PCS | Mod: 95,,, | Performed by: NURSE PRACTITIONER

## 2022-07-15 PROCEDURE — 1160F PR REVIEW ALL MEDS BY PRESCRIBER/CLIN PHARMACIST DOCUMENTED: ICD-10-PCS | Mod: CPTII,95,, | Performed by: NURSE PRACTITIONER

## 2022-07-15 RX ORDER — VENLAFAXINE HYDROCHLORIDE 75 MG/1
75 CAPSULE, EXTENDED RELEASE ORAL DAILY
Qty: 90 CAPSULE | Refills: 1 | Status: SHIPPED | OUTPATIENT
Start: 2022-07-15 | End: 2023-01-11 | Stop reason: SDUPTHER

## 2022-07-15 RX ORDER — HYDROXYZINE PAMOATE 25 MG/1
25 CAPSULE ORAL
COMMUNITY
Start: 2022-03-24 | End: 2022-07-15 | Stop reason: SDUPTHER

## 2022-07-15 RX ORDER — SIMVASTATIN 40 MG/1
40 TABLET, FILM COATED ORAL NIGHTLY
Qty: 90 TABLET | Refills: 1 | Status: SHIPPED | OUTPATIENT
Start: 2022-07-15 | End: 2023-01-11 | Stop reason: SDUPTHER

## 2022-07-15 RX ORDER — SIMVASTATIN 40 MG/1
TABLET, FILM COATED ORAL
COMMUNITY
Start: 2021-09-10 | End: 2022-07-15 | Stop reason: SDUPTHER

## 2022-07-15 RX ORDER — VENLAFAXINE HYDROCHLORIDE 37.5 MG/1
37.5 CAPSULE, EXTENDED RELEASE ORAL
COMMUNITY
Start: 2021-10-05 | End: 2022-07-15 | Stop reason: SDUPTHER

## 2022-07-15 RX ORDER — VENLAFAXINE HYDROCHLORIDE 37.5 MG/1
37.5 CAPSULE, EXTENDED RELEASE ORAL DAILY
Qty: 90 CAPSULE | Refills: 1 | Status: SHIPPED | OUTPATIENT
Start: 2022-07-15 | End: 2023-01-11 | Stop reason: SDUPTHER

## 2022-07-15 RX ORDER — HYDROXYZINE PAMOATE 25 MG/1
25 CAPSULE ORAL NIGHTLY PRN
Qty: 30 CAPSULE | Refills: 4 | Status: SHIPPED | OUTPATIENT
Start: 2022-07-15 | End: 2023-01-11 | Stop reason: SDUPTHER

## 2022-07-15 RX ORDER — IBUPROFEN 800 MG/1
800 TABLET ORAL
COMMUNITY
Start: 2021-12-29

## 2022-07-15 RX ORDER — VENLAFAXINE HYDROCHLORIDE 75 MG/1
75 CAPSULE, EXTENDED RELEASE ORAL
COMMUNITY
Start: 2021-10-05 | End: 2022-07-15 | Stop reason: SDUPTHER

## 2022-07-15 NOTE — PROGRESS NOTES
Established Patient - Audio Only Telehealth Visit     The patient location is: home  The chief complaint leading to consultation is: lab review  Visit type: Virtual visit with audio only (telephone)  Total time spent with patient: 40       The reason for the audio only service rather than synchronous audio and video virtual visit was related to technical difficulties or patient preference/necessity.     Each patient to whom I provide medical services by telemedicine is:  (1) informed of the relationship between the physician and patient and the respective role of any other health care provider with respect to management of the patient; and (2) notified that they may decline to receive medical services by telemedicine and may withdraw from such care at any time. Patient verbally consented to receive this service via voice-only telephone call.       [facility]  [facility]Y Brown     Patient Name: Twila Felix   : 1960  MRN:[id number]     CC:  Chief Complaint   Patient presents with    Follow-up     Lab results        HPI  61 year old white female, telemedicine apt for f/u lab results.  C/o intermittent, chronic low back pain with right upper sharp left pain, worse with extended standing. Non-relieved with rest. Takes IBU prn pain with moderate relief. No prior back imaging noted. US right leg venous negative. Right knee xray neg.   PMH HLD, HTN, PVD, DIANA on CPAP, tobacco user 1/2ppd, depression, chronic right knee pain. Mood stable.Taking Effexor 75mg and 37.5mg daily.Was tried on hctz and losartan in the past but stopped due to it dropping her too low. Lisinopril caused angioedema. Denies smoking cessation classes/medications. Following Dr. Hannah for peripheral vascular reflux. Pt is using CPAP nightly, benefiting from use and would like to continue use of CPAP.     Denies chest pain, shortness of breath, cough, fever, headache, dizziness, weakness, abdominal pain, nausea, vomiting,  diarrhea, constipation, dysuria, SI, HI, anxiety.    MMG 4/28/2022: BIRADS 1  FIT neg 12/8/2020  Following [facility]; last apt 10/5/2021        ROS  Review of Systems   Constitutional: Negative.    HENT: Negative.    Eyes: Negative.    Respiratory: Negative.    Cardiovascular: Negative.    Gastrointestinal: Negative.    Endocrine: Negative.    Genitourinary: Negative.    Musculoskeletal: Positive for back pain and leg pain.        Right upper leg sharp   Integumentary:  Negative.   Allergic/Immunologic: Negative.    Neurological: Negative.    Hematological: Negative.    Psychiatric/Behavioral: Negative.    All other systems reviewed and are negative.       Physical Examination:  There were no vitals filed for this visit.       Physical Exam  Nursing note reviewed.   Neurological:      Mental Status: She is alert.   Psychiatric:         Mood and Affect: Mood normal.         Behavior: Behavior normal.         Thought Content: Thought content normal.         Judgment: Judgment normal.            Labs/Imaging:  Reviewed    Assessment/Plan:  1. Hyperlipidemia, unspecified hyperlipidemia type  - CBC Auto Differential; Future  - Comprehensive Metabolic Panel; Future  - Lipid Panel; Future  Lab Results   Component Value Date    .00 07/14/2022    HDL 42 07/14/2022    TRIG 152 (H) 07/14/2022       Cont RX daily; refills given. Take Omega 3 daily.   Stressed importance of dietary modifications. Follow a low cholesterol, low saturated fat diet with less that 200mg of cholesterol a day.  Avoid fried foods and high saturated fats (high saturated fats less than 7% of calories).  Add Flax Seed/Fish Oil supplements to diet. Increase dietary fiber.  Regular exercise can reduce LDL and raise HDL. Stressed importance of physical activity 5 times per week for 30 minutes per day.     2. Chronic right-sided low back pain with right-sided sciatica  - X-Ray Lumbar Spine 5 View; Future  IBU prn  HEP/stretches    3. Depression,  unspecified depression type  Effexor and Vistaril continued.  Stable.  Practice deep breathing or abdominal breathing exercises when anxiety occurs.  Exercise daily. Get sunlight daily.  Avoid caffeine, alcohol and stimulants.  Practice positive phrases and repeat throughout the day, yoga, lavender scents or Chamomile tea will help anxiety.  Set healthy boundaries, avoid people and conversations that increase stress.  Reports any symptoms of suicidal or homicidal ideations immediately, if clinic is closed go to nearest emergency room.    4. Tobacco user  Smoking cessation advised. Instructed on smoking cessation program through [facility] and pharmacological interventions to aid in cessation.  Not ready to quit. 1/2ppd.    5. Colon cancer screening  - Cologuard Screening (Multitarget Stool DNA); Future  - Cologuard Screening (Multitarget Stool DNA)    6. Obesity, unspecified classification, unspecified obesity type, unspecified whether serious comorbidity present  Goal BMI <30.  Exercise 5 times a week for 30 minutes per day.  Avoid soda, simple sugars, excessive rice, potatoes or bread. Limit fast foods and fried foods.  Choose complex carbs in moderation (example: green vegetables, beans, oatmeal). Eat plenty of fresh fruits and vegetables with lean meats daily.  Do not skip meals. Eat a balanced portion size.  Avoid fad diets. Consider permanent healthy life style changes.     7. Vitamin D deficiency  - Vitamin D; Future  Vit d at goal. Continue OTC VIT D daily       Medication List with Changes/Refills   Current Medications    IBUPROFEN (ADVIL,MOTRIN) 800 MG TABLET    Take 800 mg by mouth.   Changed and/or Refilled Medications    Modified Medication Previous Medication    HYDROXYZINE PAMOATE (VISTARIL) 25 MG CAP hydrOXYzine pamoate (VISTARIL) 25 MG Cap       Take 1 capsule (25 mg total) by mouth nightly as needed (sleep).    Take 25 mg by mouth.    SIMVASTATIN (ZOCOR) 40 MG TABLET simvastatin (ZOCOR) 40 MG tablet        Take 1 tablet (40 mg total) by mouth every evening.      See Instructions, TAKE 1 TABLET BY MOUTH AT BEDTIME, # 90 tab(s), 1 Refill(s), Pharmacy: [facility], 150, cm, Height/Length Dosing, 12/29/21 12:24:00 CST, 74.6, kg, Weight Dosing, 12/29/21 12:24:00 CST    VENLAFAXINE (EFFEXOR-XR) 37.5 MG 24 HR CAPSULE venlafaxine (EFFEXOR-XR) 37.5 MG 24 hr capsule       Take 1 capsule (37.5 mg total) by mouth once daily.    Take 37.5 mg by mouth.    VENLAFAXINE (EFFEXOR-XR) 75 MG 24 HR CAPSULE venlafaxine (EFFEXOR-XR) 75 MG 24 hr capsule       Take 1 capsule (75 mg total) by mouth once daily.    Take 75 mg by mouth.        Orders Placed This Encounter   Procedures    Cologuard Screening (Multitarget Stool DNA)    X-Ray Lumbar Spine 5 View    CBC Auto Differential    Comprehensive Metabolic Panel    Vitamin D    Lipid Panel         Future Appointments   Date Time Provider Department Center   10/6/2022  2:00 PM TRACEY Ornelas [facility] [place]        Labs thoroughly reviewed with patient. Medication refills addressed today.  RTC prn and 4 months, with labs 1 week prior to the apt.  COVID 19 precautions given to patient.  Patient voices understanding of all discharge instructions.                              This service was not originating from a related E/M service provided within the previous 7 days nor will  to an E/M service or procedure within the next 24 hours or my soonest available appointment.  Prevailing standard of care was able to be met in this audio-only visit.

## 2022-07-15 NOTE — TELEPHONE ENCOUNTER
----- Message from TRACEY Brown sent at 7/15/2022  8:45 AM CDT -----  Pt states she needs to renew CPAP supplies, [employer]. please f/u with sleep labSharon. Thanks

## 2022-09-22 ENCOUNTER — HISTORICAL (OUTPATIENT)
Dept: ADMINISTRATIVE | Facility: HOSPITAL | Age: 62
End: 2022-09-22
Payer: MEDICAID

## 2022-10-13 ENCOUNTER — TELEPHONE (OUTPATIENT)
Dept: INTERNAL MEDICINE | Facility: CLINIC | Age: 62
End: 2022-10-13
Payer: MEDICAID

## 2022-10-13 ENCOUNTER — HOSPITAL ENCOUNTER (OUTPATIENT)
Dept: RADIOLOGY | Facility: HOSPITAL | Age: 62
Discharge: HOME | End: 2022-10-13
Attending: NURSE PRACTITIONER
Payer: MEDICAID

## 2022-10-13 DIAGNOSIS — M54.41 CHRONIC RIGHT-SIDED LOW BACK PAIN WITH RIGHT-SIDED SCIATICA: ICD-10-CM

## 2022-10-13 DIAGNOSIS — G89.29 CHRONIC RIGHT-SIDED LOW BACK PAIN WITH RIGHT-SIDED SCIATICA: ICD-10-CM

## 2022-10-13 PROCEDURE — 72110 X-RAY EXAM L-2 SPINE 4/>VWS: CPT | Mod: TC

## 2022-11-04 LAB — NONINV COLON CA DNA+OCC BLD SCRN STL QL: POSITIVE

## 2022-11-08 ENCOUNTER — TELEPHONE (OUTPATIENT)
Dept: INTERNAL MEDICINE | Facility: CLINIC | Age: 62
End: 2022-11-08
Payer: MEDICAID

## 2022-11-08 DIAGNOSIS — R19.5 ABNORMAL STOOL TEST: Primary | ICD-10-CM

## 2022-11-08 NOTE — TELEPHONE ENCOUNTER
José Luis Fields,   The referral to GI is incorrect. Given the positive Cologuard, she would need to be referred to Endo  for a colonoscopy per the new protocol that was sent out 10/21/22 and again to IM last week. Please cancel the GI referral and place a new one to Endo . Thanks

## 2022-11-08 NOTE — TELEPHONE ENCOUNTER
Please let patient know 10/13/22 XR lumbar spine results reviewed with findings of arthritis and slipped disc noted to L5-S1. If patient is interested, she can be referred for physical therapy. If she agrees, let me know and I will place referral to [facility] in [place].     Thank you

## 2022-11-08 NOTE — TELEPHONE ENCOUNTER
Please let patient know stool test (cologuard) results are abnormal. It is recommended she have further evaluation with a colonoscopy to determine why results are abnormal. I placed referral to the [facility] GI clinic for further evaluation. She will be contacted for an appointment by their clinic.     Please let me know if she has any questions/ concerns.     Thanks

## 2022-11-08 NOTE — TELEPHONE ENCOUNTER
Contact pt at number listed in the chart. Patient verbalized understanding. Pt also ask if she can get the results from the xray that was done on her back.

## 2022-11-09 NOTE — TELEPHONE ENCOUNTER
Noted. Encourage home exercises, may use ice/heat as needed for relief 3-4 x daily x 15- 20 minutes at a time and OTC pain reliever medications such as Tylenol / Ibuprofen. If any worsening back pain, numbness/ weakness/ tingling to lower legs or loss of bowel/ bladder function, patient to notify provider or report to ER.    Thanks

## 2022-11-09 NOTE — TELEPHONE ENCOUNTER
Contact pt at number listed in the chart. Patient verbalized understanding. Pt states that she does not want to go to PT right now

## 2022-12-27 DIAGNOSIS — Z12.11 SCREENING FOR COLON CANCER: Primary | ICD-10-CM

## 2023-01-03 RX ORDER — POLYETHYLENE GLYCOL 3350, SODIUM SULFATE, SODIUM CHLORIDE, POTASSIUM CHLORIDE, SODIUM ASCORBATE, AND ASCORBIC ACID 7.5-2.691G
2000 KIT ORAL ONCE
Qty: 1 KIT | Refills: 0 | Status: SHIPPED | OUTPATIENT
Start: 2023-01-03 | End: 2023-01-03

## 2023-01-10 ENCOUNTER — LAB VISIT (OUTPATIENT)
Dept: LAB | Facility: HOSPITAL | Age: 63
End: 2023-01-10
Attending: NURSE PRACTITIONER
Payer: MEDICAID

## 2023-01-10 DIAGNOSIS — E78.5 HYPERLIPIDEMIA, UNSPECIFIED HYPERLIPIDEMIA TYPE: ICD-10-CM

## 2023-01-10 DIAGNOSIS — E55.9 VITAMIN D DEFICIENCY: ICD-10-CM

## 2023-01-10 LAB
ALBUMIN SERPL-MCNC: 3.7 G/DL (ref 3.4–4.8)
ALBUMIN/GLOB SERPL: 1.2 RATIO (ref 1.1–2)
ALP SERPL-CCNC: 91 UNIT/L (ref 40–150)
ALT SERPL-CCNC: 31 UNIT/L (ref 0–55)
AST SERPL-CCNC: 28 UNIT/L (ref 5–34)
BASOPHILS # BLD AUTO: 0.03 X10(3)/MCL (ref 0–0.2)
BASOPHILS NFR BLD AUTO: 0.5 %
BILIRUBIN DIRECT+TOT PNL SERPL-MCNC: 0.3 MG/DL
BUN SERPL-MCNC: 17.2 MG/DL (ref 9.8–20.1)
CALCIUM SERPL-MCNC: 9.8 MG/DL (ref 8.4–10.2)
CHLORIDE SERPL-SCNC: 108 MMOL/L (ref 98–107)
CHOLEST SERPL-MCNC: 162 MG/DL
CHOLEST/HDLC SERPL: 4 {RATIO} (ref 0–5)
CO2 SERPL-SCNC: 27 MMOL/L (ref 23–31)
CREAT SERPL-MCNC: 0.61 MG/DL (ref 0.55–1.02)
DEPRECATED CALCIDIOL+CALCIFEROL SERPL-MC: 42.1 NG/ML (ref 30–80)
EOSINOPHIL # BLD AUTO: 0.22 X10(3)/MCL (ref 0–0.9)
EOSINOPHIL NFR BLD AUTO: 3.6 %
ERYTHROCYTE [DISTWIDTH] IN BLOOD BY AUTOMATED COUNT: 12.8 % (ref 11.5–17)
GFR SERPLBLD CREATININE-BSD FMLA CKD-EPI: >60 MLS/MIN/1.73/M2
GLOBULIN SER-MCNC: 3.2 GM/DL (ref 2.4–3.5)
GLUCOSE SERPL-MCNC: 94 MG/DL (ref 82–115)
HCT VFR BLD AUTO: 44.8 % (ref 37–47)
HDLC SERPL-MCNC: 41 MG/DL (ref 35–60)
HGB BLD-MCNC: 14.5 GM/DL (ref 12–16)
IMM GRANULOCYTES # BLD AUTO: 0.03 X10(3)/MCL (ref 0–0.04)
IMM GRANULOCYTES NFR BLD AUTO: 0.5 %
LDLC SERPL CALC-MCNC: 99 MG/DL (ref 50–140)
LYMPHOCYTES # BLD AUTO: 1.43 X10(3)/MCL (ref 0.6–4.6)
LYMPHOCYTES NFR BLD AUTO: 23.7 %
MCH RBC QN AUTO: 32.4 PG
MCHC RBC AUTO-ENTMCNC: 32.4 MG/DL (ref 33–36)
MCV RBC AUTO: 100 FL (ref 80–94)
MONOCYTES # BLD AUTO: 0.54 X10(3)/MCL (ref 0.1–1.3)
MONOCYTES NFR BLD AUTO: 9 %
NEUTROPHILS # BLD AUTO: 3.78 X10(3)/MCL (ref 2.1–9.2)
NEUTROPHILS NFR BLD AUTO: 62.7 %
NRBC BLD AUTO-RTO: 0 %
PLATELET # BLD AUTO: 146 X10(3)/MCL (ref 130–400)
PMV BLD AUTO: 12.6 FL (ref 7.4–10.4)
POTASSIUM SERPL-SCNC: 4.9 MMOL/L (ref 3.5–5.1)
PROT SERPL-MCNC: 6.9 GM/DL (ref 5.8–7.6)
RBC # BLD AUTO: 4.48 X10(6)/MCL (ref 4.2–5.4)
SODIUM SERPL-SCNC: 143 MMOL/L (ref 136–145)
TRIGL SERPL-MCNC: 109 MG/DL (ref 37–140)
VLDLC SERPL CALC-MCNC: 22 MG/DL
WBC # SPEC AUTO: 6 X10(3)/MCL (ref 4.5–11.5)

## 2023-01-10 PROCEDURE — 80053 COMPREHEN METABOLIC PANEL: CPT

## 2023-01-10 PROCEDURE — 80061 LIPID PANEL: CPT

## 2023-01-10 PROCEDURE — 85025 COMPLETE CBC W/AUTO DIFF WBC: CPT

## 2023-01-10 PROCEDURE — 36415 COLL VENOUS BLD VENIPUNCTURE: CPT

## 2023-01-10 PROCEDURE — 82306 VITAMIN D 25 HYDROXY: CPT

## 2023-01-11 ENCOUNTER — OFFICE VISIT (OUTPATIENT)
Dept: INTERNAL MEDICINE | Facility: CLINIC | Age: 63
End: 2023-01-11
Attending: NURSE PRACTITIONER
Payer: MEDICAID

## 2023-01-11 VITALS
HEART RATE: 67 BPM | SYSTOLIC BLOOD PRESSURE: 136 MMHG | RESPIRATION RATE: 18 BRPM | DIASTOLIC BLOOD PRESSURE: 84 MMHG | WEIGHT: 175 LBS | BODY MASS INDEX: 35.28 KG/M2 | HEIGHT: 59 IN | TEMPERATURE: 98 F

## 2023-01-11 DIAGNOSIS — E66.9 OBESITY, UNSPECIFIED CLASSIFICATION, UNSPECIFIED OBESITY TYPE, UNSPECIFIED WHETHER SERIOUS COMORBIDITY PRESENT: ICD-10-CM

## 2023-01-11 DIAGNOSIS — F32.A DEPRESSION, UNSPECIFIED DEPRESSION TYPE: ICD-10-CM

## 2023-01-11 DIAGNOSIS — E78.5 HYPERLIPIDEMIA, UNSPECIFIED HYPERLIPIDEMIA TYPE: ICD-10-CM

## 2023-01-11 DIAGNOSIS — F17.210 CIGARETTE NICOTINE DEPENDENCE WITHOUT COMPLICATION: ICD-10-CM

## 2023-01-11 DIAGNOSIS — Z11.4 ENCOUNTER FOR SCREENING FOR HIV: ICD-10-CM

## 2023-01-11 DIAGNOSIS — E55.9 VITAMIN D DEFICIENCY: ICD-10-CM

## 2023-01-11 DIAGNOSIS — G89.29 CHRONIC RIGHT-SIDED LOW BACK PAIN WITH RIGHT-SIDED SCIATICA: ICD-10-CM

## 2023-01-11 DIAGNOSIS — Z12.31 BREAST CANCER SCREENING BY MAMMOGRAM: Primary | ICD-10-CM

## 2023-01-11 DIAGNOSIS — M54.41 CHRONIC RIGHT-SIDED LOW BACK PAIN WITH RIGHT-SIDED SCIATICA: ICD-10-CM

## 2023-01-11 PROCEDURE — 3008F BODY MASS INDEX DOCD: CPT | Mod: CPTII,,, | Performed by: NURSE PRACTITIONER

## 2023-01-11 PROCEDURE — 3075F PR MOST RECENT SYSTOLIC BLOOD PRESS GE 130-139MM HG: ICD-10-PCS | Mod: CPTII,,, | Performed by: NURSE PRACTITIONER

## 2023-01-11 PROCEDURE — 1159F PR MEDICATION LIST DOCUMENTED IN MEDICAL RECORD: ICD-10-PCS | Mod: CPTII,,, | Performed by: NURSE PRACTITIONER

## 2023-01-11 PROCEDURE — 99406 BEHAV CHNG SMOKING 3-10 MIN: CPT | Mod: S$PBB,,, | Performed by: NURSE PRACTITIONER

## 2023-01-11 PROCEDURE — 1160F RVW MEDS BY RX/DR IN RCRD: CPT | Mod: CPTII,,, | Performed by: NURSE PRACTITIONER

## 2023-01-11 PROCEDURE — 99214 PR OFFICE/OUTPT VISIT, EST, LEVL IV, 30-39 MIN: ICD-10-PCS | Mod: S$PBB,25,, | Performed by: NURSE PRACTITIONER

## 2023-01-11 PROCEDURE — 3008F PR BODY MASS INDEX (BMI) DOCUMENTED: ICD-10-PCS | Mod: CPTII,,, | Performed by: NURSE PRACTITIONER

## 2023-01-11 PROCEDURE — 99214 OFFICE O/P EST MOD 30 MIN: CPT | Mod: PBBFAC | Performed by: NURSE PRACTITIONER

## 2023-01-11 PROCEDURE — 1159F MED LIST DOCD IN RCRD: CPT | Mod: CPTII,,, | Performed by: NURSE PRACTITIONER

## 2023-01-11 PROCEDURE — 1160F PR REVIEW ALL MEDS BY PRESCRIBER/CLIN PHARMACIST DOCUMENTED: ICD-10-PCS | Mod: CPTII,,, | Performed by: NURSE PRACTITIONER

## 2023-01-11 PROCEDURE — 3075F SYST BP GE 130 - 139MM HG: CPT | Mod: CPTII,,, | Performed by: NURSE PRACTITIONER

## 2023-01-11 PROCEDURE — 3079F DIAST BP 80-89 MM HG: CPT | Mod: CPTII,,, | Performed by: NURSE PRACTITIONER

## 2023-01-11 PROCEDURE — 99406 PR TOBACCO USE CESSATION INTERMEDIATE 3-10 MINUTES: ICD-10-PCS | Mod: S$PBB,,, | Performed by: NURSE PRACTITIONER

## 2023-01-11 PROCEDURE — 99214 OFFICE O/P EST MOD 30 MIN: CPT | Mod: S$PBB,25,, | Performed by: NURSE PRACTITIONER

## 2023-01-11 PROCEDURE — 3079F PR MOST RECENT DIASTOLIC BLOOD PRESSURE 80-89 MM HG: ICD-10-PCS | Mod: CPTII,,, | Performed by: NURSE PRACTITIONER

## 2023-01-11 RX ORDER — HYDROXYZINE PAMOATE 25 MG/1
25 CAPSULE ORAL NIGHTLY PRN
Qty: 30 CAPSULE | Refills: 4 | Status: SHIPPED | OUTPATIENT
Start: 2023-01-11 | End: 2023-07-18 | Stop reason: SDUPTHER

## 2023-01-11 RX ORDER — DIAZEPAM 10 MG/1
TABLET ORAL
Status: ON HOLD | COMMUNITY
Start: 2023-01-06 | End: 2023-07-31 | Stop reason: HOSPADM

## 2023-01-11 RX ORDER — VENLAFAXINE HYDROCHLORIDE 75 MG/1
75 CAPSULE, EXTENDED RELEASE ORAL DAILY
Qty: 90 CAPSULE | Refills: 1 | Status: SHIPPED | OUTPATIENT
Start: 2023-01-11 | End: 2023-05-11 | Stop reason: SDUPTHER

## 2023-01-11 RX ORDER — VENLAFAXINE HYDROCHLORIDE 37.5 MG/1
37.5 CAPSULE, EXTENDED RELEASE ORAL DAILY
Qty: 90 CAPSULE | Refills: 1 | Status: SHIPPED | OUTPATIENT
Start: 2023-01-11 | End: 2023-05-11 | Stop reason: SDUPTHER

## 2023-01-11 RX ORDER — SIMVASTATIN 40 MG/1
40 TABLET, FILM COATED ORAL NIGHTLY
Qty: 90 TABLET | Refills: 1 | Status: SHIPPED | OUTPATIENT
Start: 2023-01-11 | End: 2023-04-28

## 2023-01-11 RX ORDER — FLUTICASONE PROPIONATE 50 MCG
SPRAY, SUSPENSION (ML) NASAL
COMMUNITY
Start: 2022-07-26

## 2023-01-11 RX ORDER — CETIRIZINE HYDROCHLORIDE 10 MG/1
10 TABLET ORAL
COMMUNITY
Start: 2022-07-26

## 2023-01-11 NOTE — PROGRESS NOTES
[facility] Medicine Clinic  TRACEY Brown     Patient Name: Twila Felix   : 1960  MRN:[id number]     CC:  Chief Complaint   Patient presents with    Follow-up    Lab review, refills        HPI  62 year old white female, presents in clinic for f/u lab results.   No acute complaints.  PMH HLD, HTN, PVD, DIANA on CPAP, tobacco user 1/2ppd, depression, chronic right knee pain. Mood stable.Taking Effexor 75mg and 37.5mg daily.Was tried on hctz and losartan in the past but stopped due to it dropping her too low. Lisinopril caused angioedema. Denies smoking cessation classes/medications. Following Dr. Hannah for peripheral vascular reflux, left leg venous procedure on 2023. Pt is using CPAP nightly, benefiting from use and would like to continue use of CPAP.      Denies chest pain, shortness of breath, cough, fever, headache, dizziness, weakness, abdominal pain, nausea, vomiting, diarrhea, constipation, dysuria, SI, HI, anxiety.     MMG ordered  FIT neg 2020  Following [facility] GYN  Cologuard 10/2022 positive, pt is scheduled for colonoscopy at [facility] on 2023        ROS  Review of Systems   Constitutional: Negative.    HENT: Negative.     Eyes: Negative.    Respiratory: Negative.     Cardiovascular: Negative.    Gastrointestinal: Negative.    Endocrine: Negative.    Genitourinary: Negative.    Musculoskeletal: Negative.    Integumentary:  Negative.   Allergic/Immunologic: Negative.    Neurological: Negative.    Hematological: Negative.    Psychiatric/Behavioral: Negative.     All other systems reviewed and are negative.     Physical Examination:  Vitals:    23 0927   BP: 136/84   Pulse: 67   Resp: 18   Temp: 98 °F (36.7 °C)          Physical Exam  Vitals and nursing note reviewed.   Constitutional:       Appearance: Normal appearance.   HENT:      Head: Normocephalic and atraumatic.      Right Ear: Tympanic membrane, ear canal and external ear normal.      Left Ear: Tympanic membrane, ear canal  and external ear normal.      Nose: Nose normal.      Mouth/Throat:      Mouth: Mucous membranes are moist.      Pharynx: Oropharynx is clear.   Eyes:      Extraocular Movements: Extraocular movements intact.      Conjunctiva/sclera: Conjunctivae normal.      Pupils: Pupils are equal, round, and reactive to light.   Cardiovascular:      Rate and Rhythm: Normal rate and regular rhythm.      Pulses: Normal pulses.      Heart sounds: Normal heart sounds.   Pulmonary:      Effort: Pulmonary effort is normal.      Breath sounds: Normal breath sounds.   Abdominal:      General: Abdomen is flat. Bowel sounds are normal.      Palpations: Abdomen is soft.   Musculoskeletal:         General: Normal range of motion.      Cervical back: Normal range of motion and neck supple.   Skin:     General: Skin is warm and dry.      Capillary Refill: Capillary refill takes less than 2 seconds.   Neurological:      General: No focal deficit present.      Mental Status: She is alert and oriented to person, place, and time. Mental status is at baseline.   Psychiatric:         Mood and Affect: Mood normal.         Behavior: Behavior normal.         Thought Content: Thought content normal.         Judgment: Judgment normal.          Labs/Imaging:  Reviewed    Assessment/Plan:  1. Hyperlipidemia, unspecified hyperlipidemia type  - CBC Auto Differential; Future  - Comprehensive Metabolic Panel; Future  - Lipid Panel; Future  - Urinalysis; Future  - Urinalysis  Lab Results   Component Value Date    LDL 99.00 01/10/2023    HDL 41 01/10/2023    TRIG 109 01/10/2023       Cont RX daily; refills given. Take Omega 3 daily.   Stressed importance of dietary modifications. Follow a low cholesterol, low saturated fat diet with less that 200mg of cholesterol a day.  Avoid fried foods and high saturated fats (high saturated fats less than 7% of calories).  Add Flax Seed/Fish Oil supplements to diet. Increase dietary fiber.  Regular exercise can reduce LDL  and raise HDL. Stressed importance of physical activity 5 times per week for 30 minutes per day.     2. Vitamin D deficiency  VIT D at goal, take otc vit d once daily    3. Depression, unspecified depression type  Effexor refilled, stable.  Practice deep breathing or abdominal breathing exercises when anxiety occurs.  Exercise daily. Get sunlight daily.  Avoid caffeine, alcohol and stimulants.  Practice positive phrases and repeat throughout the day, yoga, lavender scents or Chamomile tea will help anxiety.  Set healthy boundaries, avoid people and conversations that increase stress.  Reports any symptoms of suicidal or homicidal ideations immediately, if clinic is closed go to nearest emergency room.    4. Chronic right-sided low back pain with right-sided sciatica  Improved.   HEP     5. Cigarette nicotine dependence without complication  Not ready to quit. Smoking cessation advised. Instructed on smoking cessation program through [facility] and pharmacological interventions to aid in cessation.  >5 minutes allotted to educate patient on the harms of smoking, the urgency to quit, and the development of a plan for smoking cessation.    6. Obesity, unspecified classification, unspecified obesity type, unspecified whether serious comorbidity present  Goal BMI <30.  Exercise 5 times a week for 30 minutes per day.  Avoid soda, simple sugars, excessive rice, potatoes or bread. Limit fast foods and fried foods.  Choose complex carbs in moderation (example: green vegetables, beans, oatmeal). Eat plenty of fresh fruits and vegetables with lean meats daily.  Do not skip meals. Eat a balanced portion size.  Avoid fad diets. Consider permanent healthy life style changes.     7. Breast cancer screening by mammogram  - Mammo Digital Screening Bilat w/ Yuri; Future    8. Encounter for screening for HIV  - HIV 1/2 Ag/Ab (4th Gen); Future         Medication List with Changes/Refills   Current Medications    CETIRIZINE (ZYRTEC) 10 MG  TABLET    Take 10 mg by mouth.    DIAZEPAM (VALIUM) 10 MG TAB    TAKE 1 TABLET BY MOUTH PRIOR TO VEIN PROCEDURE    FLUTICASONE PROPIONATE (FLONASE) 50 MCG/ACTUATION NASAL SPRAY    SMARTSI-2 Spray(s) Both Nares Daily    IBUPROFEN (ADVIL,MOTRIN) 800 MG TABLET    Take 800 mg by mouth.    MV-MN/IRON/FOLIC ACID/HERB 190 (VITAMIN D3 COMPLETE ORAL)      Daily, 0 Refill(s)   Changed and/or Refilled Medications    Modified Medication Previous Medication    HYDROXYZINE PAMOATE (VISTARIL) 25 MG CAP hydrOXYzine pamoate (VISTARIL) 25 MG Cap       Take 1 capsule (25 mg total) by mouth nightly as needed (sleep).    Take 1 capsule (25 mg total) by mouth nightly as needed (sleep).    SIMVASTATIN (ZOCOR) 40 MG TABLET simvastatin (ZOCOR) 40 MG tablet       Take 1 tablet (40 mg total) by mouth every evening.    Take 1 tablet (40 mg total) by mouth every evening.    VENLAFAXINE (EFFEXOR-XR) 37.5 MG 24 HR CAPSULE venlafaxine (EFFEXOR-XR) 37.5 MG 24 hr capsule       Take 1 capsule (37.5 mg total) by mouth once daily.    Take 1 capsule (37.5 mg total) by mouth once daily.    VENLAFAXINE (EFFEXOR-XR) 75 MG 24 HR CAPSULE venlafaxine (EFFEXOR-XR) 75 MG 24 hr capsule       Take 1 capsule (75 mg total) by mouth once daily.    Take 1 capsule (75 mg total) by mouth once daily.        Orders Placed This Encounter   Procedures    Mammo Digital Screening Bilat w/ Yuri    CBC Auto Differential    Comprehensive Metabolic Panel    Lipid Panel    Urinalysis    HIV 1/2 Ag/Ab (4th Gen)         Future Appointments   Date Time Provider Department Center   2023  8:45 AM Swapna Luis [facility] [place] [facility]   10/3/2023  2:00 PM Bryanna Torres V [facility] GYN [place] [facility]        Labs thoroughly reviewed with patient. Medication refills addressed today.  RTC prn and 3-4 months, with labs 1 week prior to the apt.  COVID 19 precautions given to patient.  Patient voices understanding of all discharge instructions.

## 2023-01-20 ENCOUNTER — HOSPITAL ENCOUNTER (OUTPATIENT)
Dept: RADIOLOGY | Facility: HOSPITAL | Age: 63
Discharge: HOME | End: 2023-01-20
Attending: NURSE PRACTITIONER
Payer: MEDICAID

## 2023-01-20 DIAGNOSIS — Z12.31 BREAST CANCER SCREENING BY MAMMOGRAM: ICD-10-CM

## 2023-01-20 PROCEDURE — 77067 SCR MAMMO BI INCL CAD: CPT | Mod: 26,,, | Performed by: RADIOLOGY

## 2023-01-20 PROCEDURE — 77067 MAMMO DIGITAL SCREENING BILAT WITH TOMO: ICD-10-PCS | Mod: 26,,, | Performed by: RADIOLOGY

## 2023-01-20 PROCEDURE — 77063 BREAST TOMOSYNTHESIS BI: CPT | Mod: 26,,, | Performed by: RADIOLOGY

## 2023-01-20 PROCEDURE — 77063 MAMMO DIGITAL SCREENING BILAT WITH TOMO: ICD-10-PCS | Mod: 26,,, | Performed by: RADIOLOGY

## 2023-01-20 PROCEDURE — 77067 SCR MAMMO BI INCL CAD: CPT | Mod: TC

## 2023-03-24 ENCOUNTER — DOCUMENTATION ONLY (OUTPATIENT)
Dept: ADMINISTRATIVE | Facility: HOSPITAL | Age: 63
End: 2023-03-24
Payer: MEDICAID

## 2023-04-24 ENCOUNTER — TELEPHONE (OUTPATIENT)
Dept: INTERNAL MEDICINE | Facility: CLINIC | Age: 63
End: 2023-04-24

## 2023-04-24 NOTE — TELEPHONE ENCOUNTER
Patient states she received letter from [facility] stating Simvastatin 40 mg is being recalled can we prescribe something else.

## 2023-04-27 NOTE — TELEPHONE ENCOUNTER
Patient states pharmacy is [facility]. Informed patient you will send something else.

## 2023-04-28 RX ORDER — ATORVASTATIN CALCIUM 10 MG/1
10 TABLET, FILM COATED ORAL DAILY
Qty: 90 TABLET | Refills: 1 | Status: SHIPPED | OUTPATIENT
Start: 2023-04-28 | End: 2023-05-11 | Stop reason: SDUPTHER

## 2023-04-28 NOTE — TELEPHONE ENCOUNTER
Patient informed to D/C simvastatin. New med was sent to pharmacy is atorvastatin 10 qhs.  Acknowledged understanding

## 2023-05-02 ENCOUNTER — DOCUMENTATION ONLY (OUTPATIENT)
Dept: ADMINISTRATIVE | Facility: HOSPITAL | Age: 63
End: 2023-05-02
Payer: MEDICAID

## 2023-05-10 ENCOUNTER — LAB VISIT (OUTPATIENT)
Dept: LAB | Facility: HOSPITAL | Age: 63
End: 2023-05-10
Attending: NURSE PRACTITIONER
Payer: MEDICAID

## 2023-05-10 DIAGNOSIS — E78.5 HYPERLIPIDEMIA, UNSPECIFIED HYPERLIPIDEMIA TYPE: ICD-10-CM

## 2023-05-10 DIAGNOSIS — Z11.4 ENCOUNTER FOR SCREENING FOR HIV: ICD-10-CM

## 2023-05-10 LAB
ALBUMIN SERPL-MCNC: 3.7 G/DL (ref 3.4–4.8)
ALBUMIN/GLOB SERPL: 1.2 RATIO (ref 1.1–2)
ALP SERPL-CCNC: 83 UNIT/L (ref 40–150)
ALT SERPL-CCNC: 28 UNIT/L (ref 0–55)
AST SERPL-CCNC: 31 UNIT/L (ref 5–34)
BASOPHILS # BLD AUTO: 0.02 X10(3)/MCL
BASOPHILS NFR BLD AUTO: 0.3 %
BILIRUBIN DIRECT+TOT PNL SERPL-MCNC: 0.5 MG/DL
BUN SERPL-MCNC: 11 MG/DL (ref 9.8–20.1)
CALCIUM SERPL-MCNC: 9.3 MG/DL (ref 8.4–10.2)
CHLORIDE SERPL-SCNC: 108 MMOL/L (ref 98–107)
CHOLEST SERPL-MCNC: 185 MG/DL
CHOLEST/HDLC SERPL: 5 {RATIO} (ref 0–5)
CO2 SERPL-SCNC: 27 MMOL/L (ref 23–31)
CREAT SERPL-MCNC: 0.65 MG/DL (ref 0.55–1.02)
EOSINOPHIL # BLD AUTO: 0.23 X10(3)/MCL (ref 0–0.9)
EOSINOPHIL NFR BLD AUTO: 3.5 %
ERYTHROCYTE [DISTWIDTH] IN BLOOD BY AUTOMATED COUNT: 13.4 % (ref 11.5–17)
GFR SERPLBLD CREATININE-BSD FMLA CKD-EPI: >60 MLS/MIN/1.73/M2
GLOBULIN SER-MCNC: 3 GM/DL (ref 2.4–3.5)
GLUCOSE SERPL-MCNC: 95 MG/DL (ref 82–115)
HCT VFR BLD AUTO: 41.4 % (ref 37–47)
HDLC SERPL-MCNC: 39 MG/DL (ref 35–60)
HGB BLD-MCNC: 13.4 G/DL (ref 12–16)
HIV 1+2 AB+HIV1 P24 AG SERPL QL IA: NONREACTIVE
IMM GRANULOCYTES # BLD AUTO: 0.02 X10(3)/MCL (ref 0–0.04)
IMM GRANULOCYTES NFR BLD AUTO: 0.3 %
LDLC SERPL CALC-MCNC: 121 MG/DL (ref 50–140)
LYMPHOCYTES # BLD AUTO: 1.53 X10(3)/MCL (ref 0.6–4.6)
LYMPHOCYTES NFR BLD AUTO: 23.4 %
MCH RBC QN AUTO: 32.5 PG (ref 27–31)
MCHC RBC AUTO-ENTMCNC: 32.4 G/DL (ref 33–36)
MCV RBC AUTO: 100.5 FL (ref 80–94)
MONOCYTES # BLD AUTO: 0.68 X10(3)/MCL (ref 0.1–1.3)
MONOCYTES NFR BLD AUTO: 10.4 %
NEUTROPHILS # BLD AUTO: 4.07 X10(3)/MCL (ref 2.1–9.2)
NEUTROPHILS NFR BLD AUTO: 62.1 %
NRBC BLD AUTO-RTO: 0 %
PLATELET # BLD AUTO: 158 X10(3)/MCL (ref 130–400)
PMV BLD AUTO: 12.7 FL (ref 7.4–10.4)
POTASSIUM SERPL-SCNC: 4.8 MMOL/L (ref 3.5–5.1)
PROT SERPL-MCNC: 6.7 GM/DL (ref 5.8–7.6)
RBC # BLD AUTO: 4.12 X10(6)/MCL (ref 4.2–5.4)
SODIUM SERPL-SCNC: 143 MMOL/L (ref 136–145)
TRIGL SERPL-MCNC: 123 MG/DL (ref 37–140)
VLDLC SERPL CALC-MCNC: 25 MG/DL
WBC # SPEC AUTO: 6.55 X10(3)/MCL (ref 4.5–11.5)

## 2023-05-10 PROCEDURE — 87389 HIV-1 AG W/HIV-1&-2 AB AG IA: CPT

## 2023-05-10 PROCEDURE — 85025 COMPLETE CBC W/AUTO DIFF WBC: CPT

## 2023-05-10 PROCEDURE — 80061 LIPID PANEL: CPT

## 2023-05-10 PROCEDURE — 36415 COLL VENOUS BLD VENIPUNCTURE: CPT

## 2023-05-10 PROCEDURE — 80053 COMPREHEN METABOLIC PANEL: CPT

## 2023-05-11 ENCOUNTER — HOSPITAL ENCOUNTER (OUTPATIENT)
Dept: RADIOLOGY | Facility: HOSPITAL | Age: 63
Discharge: HOME | End: 2023-05-11
Attending: NURSE PRACTITIONER
Payer: MEDICAID

## 2023-05-11 ENCOUNTER — OFFICE VISIT (OUTPATIENT)
Dept: INTERNAL MEDICINE | Facility: CLINIC | Age: 63
End: 2023-05-11
Payer: MEDICAID

## 2023-05-11 VITALS
SYSTOLIC BLOOD PRESSURE: 138 MMHG | HEIGHT: 59 IN | WEIGHT: 181.38 LBS | BODY MASS INDEX: 36.56 KG/M2 | DIASTOLIC BLOOD PRESSURE: 76 MMHG | RESPIRATION RATE: 16 BRPM | TEMPERATURE: 98 F | HEART RATE: 65 BPM

## 2023-05-11 DIAGNOSIS — M25.511 CHRONIC RIGHT SHOULDER PAIN: ICD-10-CM

## 2023-05-11 DIAGNOSIS — F32.A DEPRESSION, UNSPECIFIED DEPRESSION TYPE: ICD-10-CM

## 2023-05-11 DIAGNOSIS — E78.5 HYPERLIPIDEMIA, UNSPECIFIED HYPERLIPIDEMIA TYPE: ICD-10-CM

## 2023-05-11 DIAGNOSIS — G89.29 CHRONIC RIGHT SHOULDER PAIN: ICD-10-CM

## 2023-05-11 DIAGNOSIS — E66.9 OBESITY, UNSPECIFIED CLASSIFICATION, UNSPECIFIED OBESITY TYPE, UNSPECIFIED WHETHER SERIOUS COMORBIDITY PRESENT: ICD-10-CM

## 2023-05-11 DIAGNOSIS — R05.3 CHRONIC COUGH: ICD-10-CM

## 2023-05-11 DIAGNOSIS — F17.210 CIGARETTE NICOTINE DEPENDENCE WITHOUT COMPLICATION: ICD-10-CM

## 2023-05-11 PROCEDURE — 73030 X-RAY EXAM OF SHOULDER: CPT | Mod: TC,RT

## 2023-05-11 PROCEDURE — 99214 PR OFFICE/OUTPT VISIT, EST, LEVL IV, 30-39 MIN: ICD-10-PCS | Mod: S$PBB,25,, | Performed by: NURSE PRACTITIONER

## 2023-05-11 PROCEDURE — 1159F MED LIST DOCD IN RCRD: CPT | Mod: CPTII,,, | Performed by: NURSE PRACTITIONER

## 2023-05-11 PROCEDURE — 1160F RVW MEDS BY RX/DR IN RCRD: CPT | Mod: CPTII,,, | Performed by: NURSE PRACTITIONER

## 2023-05-11 PROCEDURE — 3008F PR BODY MASS INDEX (BMI) DOCUMENTED: ICD-10-PCS | Mod: CPTII,,, | Performed by: NURSE PRACTITIONER

## 2023-05-11 PROCEDURE — 3075F SYST BP GE 130 - 139MM HG: CPT | Mod: CPTII,,, | Performed by: NURSE PRACTITIONER

## 2023-05-11 PROCEDURE — 3078F DIAST BP <80 MM HG: CPT | Mod: CPTII,,, | Performed by: NURSE PRACTITIONER

## 2023-05-11 PROCEDURE — 3008F BODY MASS INDEX DOCD: CPT | Mod: CPTII,,, | Performed by: NURSE PRACTITIONER

## 2023-05-11 PROCEDURE — 1159F PR MEDICATION LIST DOCUMENTED IN MEDICAL RECORD: ICD-10-PCS | Mod: CPTII,,, | Performed by: NURSE PRACTITIONER

## 2023-05-11 PROCEDURE — 99406 BEHAV CHNG SMOKING 3-10 MIN: CPT | Mod: S$PBB,,, | Performed by: NURSE PRACTITIONER

## 2023-05-11 PROCEDURE — 1160F PR REVIEW ALL MEDS BY PRESCRIBER/CLIN PHARMACIST DOCUMENTED: ICD-10-PCS | Mod: CPTII,,, | Performed by: NURSE PRACTITIONER

## 2023-05-11 PROCEDURE — 99406 PR TOBACCO USE CESSATION INTERMEDIATE 3-10 MINUTES: ICD-10-PCS | Mod: S$PBB,,, | Performed by: NURSE PRACTITIONER

## 2023-05-11 PROCEDURE — 3075F PR MOST RECENT SYSTOLIC BLOOD PRESS GE 130-139MM HG: ICD-10-PCS | Mod: CPTII,,, | Performed by: NURSE PRACTITIONER

## 2023-05-11 PROCEDURE — 99214 OFFICE O/P EST MOD 30 MIN: CPT | Mod: S$PBB,25,, | Performed by: NURSE PRACTITIONER

## 2023-05-11 PROCEDURE — 99215 OFFICE O/P EST HI 40 MIN: CPT | Mod: PBBFAC | Performed by: NURSE PRACTITIONER

## 2023-05-11 PROCEDURE — 3078F PR MOST RECENT DIASTOLIC BLOOD PRESSURE < 80 MM HG: ICD-10-PCS | Mod: CPTII,,, | Performed by: NURSE PRACTITIONER

## 2023-05-11 RX ORDER — VENLAFAXINE HYDROCHLORIDE 75 MG/1
75 CAPSULE, EXTENDED RELEASE ORAL DAILY
Qty: 90 CAPSULE | Refills: 1 | Status: SHIPPED | OUTPATIENT
Start: 2023-05-11 | End: 2023-09-13 | Stop reason: SDUPTHER

## 2023-05-11 RX ORDER — AMLODIPINE BESYLATE 5 MG/1
5 TABLET ORAL
COMMUNITY
Start: 2023-03-01 | End: 2023-05-11

## 2023-05-11 RX ORDER — VENLAFAXINE HYDROCHLORIDE 37.5 MG/1
37.5 CAPSULE, EXTENDED RELEASE ORAL DAILY
Qty: 90 CAPSULE | Refills: 1 | Status: SHIPPED | OUTPATIENT
Start: 2023-05-11 | End: 2023-09-13 | Stop reason: SDUPTHER

## 2023-05-11 RX ORDER — METHYLPREDNISOLONE 4 MG/1
TABLET ORAL
Qty: 21 EACH | Refills: 0 | Status: SHIPPED | OUTPATIENT
Start: 2023-05-11 | End: 2023-06-01

## 2023-05-11 RX ORDER — ATORVASTATIN CALCIUM 20 MG/1
20 TABLET, FILM COATED ORAL NIGHTLY
Qty: 90 TABLET | Refills: 1 | Status: SHIPPED | OUTPATIENT
Start: 2023-05-11 | End: 2023-09-13 | Stop reason: SDUPTHER

## 2023-05-11 NOTE — PROGRESS NOTES
"Internal Medicine Clinic  TRACEY Brown     Patient Name: Twila Felix   : 1960  MRN:[id number]     Chief Complaint     Chief Complaint   Patient presents with    Follow-up     Lab review        History of Present Illness     62 year old white female, presents in clinic for f/u lab results.   C/o right shoulder pain >1 yr. Atraumatic, difficulty lifting arm above head.  PMH HLD, HTN, PVD, DIANA on CPAP, tobacco user 1/2ppd, depression, chronic right knee pain. Mood stable.Taking Effexor 75mg and 37.5mg daily.Was tried on hctz and losartan in the past but stopped due to it dropping her too low. Lisinopril caused angioedema. Denies smoking cessation classes/medications. Following Dr. Hannah for peripheral vascular reflux, left leg venous procedure on 2023. Pt is using CPAP nightly, benefiting from use and would like to continue use of CPAP. Sleep study 3 yrs prior.     Denies chest pain, shortness of breath, cough, fever, headache, dizziness, weakness, abdominal pain, nausea, vomiting, diarrhea, constipation, dysuria, SI, HI, anxiety.     MMG ordered  FIT neg 2020  Following [facility] GYN  Cologuard 10/2022 positive, pt is scheduled for colonoscopy at [facility] on 2023            Review of Systems     Review of Systems   Constitutional: Negative.    HENT: Negative.     Eyes: Negative.    Respiratory:  Positive for cough.    Cardiovascular: Negative.    Gastrointestinal: Negative.    Endocrine: Negative.    Genitourinary: Negative.    Musculoskeletal:  Positive for arthralgias.        Right shoulder pain   Integumentary:  Negative.   Allergic/Immunologic: Negative.    Neurological: Negative.    Hematological: Negative.    Psychiatric/Behavioral: Negative.     All other systems reviewed and are negative.     Physical Examination     Visit Vitals  /76 (BP Location: Left arm, Patient Position: Sitting, BP Method: Medium (Manual))   Pulse 65   Temp 98 °F (36.7 °C) (Oral)   Resp 16   Ht 4' 11" " (1.499 m)   Wt 82.3 kg (181 lb 6.4 oz)   BMI 36.64 kg/m²        BP Readings from Last 6 Encounters:   05/11/23 138/76   01/11/23 136/84   10/05/21 121/82   ]    Wt Readings from Last 6 Encounters:   05/11/23 82.3 kg (181 lb 6.4 oz)   01/11/23 79.4 kg (175 lb)   10/05/21 77.4 kg (170 lb 10.2 oz)   ]      Physical Exam  Vitals and nursing note reviewed.   Constitutional:       Appearance: Normal appearance.   HENT:      Head: Normocephalic and atraumatic.      Right Ear: Tympanic membrane, ear canal and external ear normal.      Left Ear: Tympanic membrane, ear canal and external ear normal.      Nose: Nose normal.      Mouth/Throat:      Mouth: Mucous membranes are moist.      Pharynx: Oropharynx is clear.   Eyes:      Extraocular Movements: Extraocular movements intact.      Conjunctiva/sclera: Conjunctivae normal.      Pupils: Pupils are equal, round, and reactive to light.   Cardiovascular:      Rate and Rhythm: Normal rate and regular rhythm.      Pulses: Normal pulses.      Heart sounds: Normal heart sounds.   Pulmonary:      Effort: Pulmonary effort is normal.      Breath sounds: Normal breath sounds.   Abdominal:      General: Abdomen is flat. Bowel sounds are normal.      Palpations: Abdomen is soft.   Musculoskeletal:         General: Normal range of motion.      Cervical back: Normal range of motion and neck supple.   Skin:     General: Skin is warm and dry.      Capillary Refill: Capillary refill takes less than 2 seconds.   Neurological:      General: No focal deficit present.      Mental Status: She is alert and oriented to person, place, and time. Mental status is at baseline.   Psychiatric:         Mood and Affect: Mood normal.         Behavior: Behavior normal.         Thought Content: Thought content normal.         Judgment: Judgment normal.        Labs / Imaging     Chemistry:  Lab Results   Component Value Date     05/10/2023    K 4.8 05/10/2023    CHLORIDE 108 (H) 05/10/2023    BUN 11.0  05/10/2023    CREATININE 0.65 05/10/2023    EGFRNORACEVR >60 05/10/2023    GLUCOSE 95 05/10/2023    CALCIUM 9.3 05/10/2023    ALKPHOS 83 05/10/2023    LABPROT 6.7 05/10/2023    ALBUMIN 3.7 05/10/2023    BILIDIR 0.2 03/10/2022    IBILI 0.20 03/10/2022    AST 31 05/10/2023    ALT 28 05/10/2023    PDFJGLTC61QV 42.1 01/10/2023        Lab Results   Component Value Date    HGBA1C 5.4 12/05/2020        Hematology:  Lab Results   Component Value Date    WBC 6.55 05/10/2023    RBC 4.12 (L) 05/10/2023    HGB 13.4 05/10/2023    HCT 41.4 05/10/2023    .5 (H) 05/10/2023    MCH 32.5 (H) 05/10/2023    MCHC 32.4 (L) 05/10/2023    RDW 13.4 05/10/2023     05/10/2023    MPV 12.7 (H) 05/10/2023        Lipid Panel:  Lab Results   Component Value Date    CHOL 185 05/10/2023    HDL 39 05/10/2023    .00 05/10/2023    TRIG 123 05/10/2023    TOTALCHOLEST 5 05/10/2023        Urine:  Lab Results   Component Value Date    COLORUA Light-Yellow (A) 07/14/2022    APPEARANCEUA Clear 07/14/2022    SGUA 1.023 07/14/2022    PHUA 5.0 07/14/2022    PROTEINUA Negative 07/14/2022    GLUCOSEUA Normal 07/14/2022    KETONESUA Negative 07/14/2022    BLOODUA Negative 07/14/2022    NITRITESUA Negative 07/14/2022    LEUKOCYTESUR Negative 07/14/2022    RBCUA 0-5 07/14/2022    WBCUA 0-5 07/14/2022    BACTERIA None Seen 07/14/2022    SQEPUA Occ (A) 07/14/2022    HYALINECASTS 0-2 (A) 07/14/2022          Assessment       ICD-10-CM ICD-9-CM   1. Hyperlipidemia, unspecified hyperlipidemia type  E78.5 272.4   2. Chronic right shoulder pain  M25.511 719.41    G89.29 338.29   3. Chronic cough  R05.3 786.2   4. Depression, unspecified depression type  F32.A 311   5. Obesity, unspecified classification, unspecified obesity type, unspecified whether serious comorbidity present  E66.9 278.00   6. Cigarette nicotine dependence without complication [F17.210 (ICD-10-CM)]  F17.210 305.1        Plan     1. Hyperlipidemia, unspecified hyperlipidemia type  Lab  Results   Component Value Date    .00 05/10/2023    HDL 39 05/10/2023    TRIG 123 05/10/2023       Increase Lipitor to 20mg. Take Omega 3 daily.   Stressed importance of dietary modifications. Follow a low cholesterol, low saturated fat diet with less that 200mg of cholesterol a day.  Avoid fried foods and high saturated fats (high saturated fats less than 7% of calories).  Add Flax Seed/Fish Oil supplements to diet. Increase dietary fiber.  Regular exercise can reduce LDL and raise HDL. Stressed importance of physical activity 5 times per week for 30 minutes per day.      2. Chronic right shoulder pain  RX medrol  HEP/stretches  Stay active. Having strong muscles takes the strain off of your joints, which can help reduce your pain.  Rest for several minutes when your pain is at its worst.  Goal BMI <30.   Alternate hot and cold packs as needed for pain relief.   - X-ray Shoulder 2 or More Views Right; Future    3. Chronic cough  Smok cessation referral  LDCT  - Complete PFT with bronchodilator; Future    4. Depression, unspecified depression type  Effexor refilled. Mood stable  Practice deep breathing or abdominal breathing exercises when anxiety occurs.  Exercise daily. Get sunlight daily.  Avoid caffeine, alcohol and stimulants.  Practice positive phrases and repeat throughout the day, yoga, lavender scents or Chamomile tea will help anxiety.  Set healthy boundaries, avoid people and conversations that increase stress.  Reports any symptoms of suicidal or homicidal ideations immediately, if clinic is closed go to nearest emergency room.     5. Obesity, unspecified classification, unspecified obesity type, unspecified whether serious comorbidity present  Goal BMI <30.  Exercise 5 times a week for 30 minutes per day.  Avoid soda, simple sugars, excessive rice, potatoes or bread. Limit fast foods and fried foods.  Choose complex carbs in moderation (example: green vegetables, beans, oatmeal). Eat plenty of  fresh fruits and vegetables with lean meats daily.  Do not skip meals. Eat a balanced portion size.  Avoid fad diets. Consider permanent healthy life style changes.       6. Cigarette nicotine dependence without complication [F17.210 (ICD-10-CM)]  Smoking cessation advised. Instructed on smoking cessation program through [facility] and pharmacological interventions to aid in cessation.  >5 minutes allotted to educate patient on the harms of smoking, the urgency to quit, and the development of a plan for smoking cessation.   - Ambulatory referral/consult to Smoking Cessation Program; Future  - CT Chest Lung Screening Low Dose; Future  - Complete PFT with bronchodilator; Future        Current Outpatient Medications   Medication Instructions    atorvastatin (LIPITOR) 20 mg, Oral, Nightly    cetirizine (ZYRTEC) 10 mg, Oral    diazePAM (VALIUM) 10 MG Tab TAKE 1 TABLET BY MOUTH PRIOR TO VEIN PROCEDURE    fluticasone propionate (FLONASE) 50 mcg/actuation nasal spray SMARTSI-2 Spray(s) Both Nares Daily    hydrOXYzine pamoate (VISTARIL) 25 mg, Oral, Nightly PRN    ibuprofen (ADVIL,MOTRIN) 800 mg, Oral    methylPREDNISolone (MEDROL DOSEPACK) 4 mg tablet use as directed    mv-mn/iron/folic acid/herb 190 (VITAMIN D3 COMPLETE ORAL)   Daily, 0 Refill(s)    venlafaxine (EFFEXOR-XR) 75 mg, Oral, Daily    venlafaxine (EFFEXOR-XR) 37.5 mg, Oral, Daily       Orders Placed This Encounter   Procedures    CT Chest Lung Screening Low Dose    X-ray Shoulder 2 or More Views Right    Ambulatory referral/consult to Smoking Cessation Program    Complete PFT with bronchodilator         Future Appointments   Date Time Provider Department Center   2023  8:30 AM [facility]   [facility]3/2023  2[facility]         Follow up in about 4 weeks (around 2023) for CT and PFT results.    Labs thoroughly reviewed with patient. Medication refills addressed today.  RTC prn and 4 weeks,  with labs 1 week prior to the apt.  COVID 19 precautions given to patient.  Patient voices understanding of all discharge instructions.      TRACEY Brown

## 2023-06-09 ENCOUNTER — HOSPITAL ENCOUNTER (OUTPATIENT)
Dept: RADIOLOGY | Facility: HOSPITAL | Age: 63
Discharge: HOME | End: 2023-06-09
Attending: NURSE PRACTITIONER
Payer: MEDICAID

## 2023-06-09 ENCOUNTER — HOSPITAL ENCOUNTER (OUTPATIENT)
Dept: PULMONOLOGY | Facility: HOSPITAL | Age: 63
Discharge: HOME | End: 2023-06-09
Attending: NURSE PRACTITIONER
Payer: MEDICAID

## 2023-06-09 DIAGNOSIS — F17.210 CIGARETTE NICOTINE DEPENDENCE WITHOUT COMPLICATION: ICD-10-CM

## 2023-06-09 DIAGNOSIS — R05.3 CHRONIC COUGH: ICD-10-CM

## 2023-06-09 PROCEDURE — 94640 AIRWAY INHALATION TREATMENT: CPT

## 2023-06-09 PROCEDURE — 94060 EVALUATION OF WHEEZING: CPT

## 2023-06-09 PROCEDURE — 94726 PLETHYSMOGRAPHY LUNG VOLUMES: CPT

## 2023-06-09 PROCEDURE — 71271 CT THORAX LUNG CANCER SCR C-: CPT | Mod: TC

## 2023-06-12 LAB
ERV LLN: -16449.3
ERV PRE REF: 114.7 %
ERV REF: 0.7
FEF 25 75 CHG: 27.1 %
FEF 25 75 LLN: 0.93
FEF 25 75 POST REF: 87.6 %
FEF 25 75 PRE REF: 68.9 %
FEF 25 75 REF: 1.88
FET100 CHG: -0.2 %
FEV1 CHG: 8.5 %
FEV1 FVC CHG: 2.7 %
FEV1 FVC LLN: 67
FEV1 FVC POST REF: 100.5 %
FEV1 FVC PRE REF: 97.8 %
FEV1 FVC REF: 80
FEV1 LLN: 1.5
FEV1 POST REF: 75.1 %
FEV1 PRE REF: 69.2 %
FEV1 REF: 2.01
FRCPLETH LLN: 1.6
FRCPLETH PREREF: 112.4 %
FRCPLETH REF: 2.42
FVC CHG: 5.7 %
FVC LLN: 1.89
FVC POST REF: 74.4 %
FVC PRE REF: 70.4 %
FVC REF: 2.53
MVV LLN: 59
MVV PRE REF: 64.8 %
MVV REF: 70
PEF CHG: -11.8 %
PEF LLN: 3.93
PEF POST REF: 54.3 %
PEF PRE REF: 61.6 %
PEF REF: 5.38
POST FEF 25 75: 1.65 L/S (ref 0.93–3.19)
POST FET 100: 7.27 SEC
POST FEV1 FVC: 79.98 % (ref 67–90.35)
POST FEV1: 1.51 L (ref 1.5–2.5)
POST FVC: 1.89 L (ref 1.89–3.2)
POST PEF: 2.92 L/S (ref 3.93–6.84)
PRE ERV: 0.8 L (ref -16449.3–16450.7)
PRE FEF 25 75: 1.3 L/S (ref 0.93–3.19)
PRE FET 100: 7.28 SEC
PRE FEV1 FVC: 77.87 % (ref 67–90.35)
PRE FEV1: 1.39 L (ref 1.5–2.5)
PRE FRC PL: 2.72 L (ref 1.6–3.24)
PRE FVC: 1.78 L (ref 1.89–3.2)
PRE MVV: 45.25 L/MIN (ref 59.31–80.25)
PRE PEF: 3.31 L/S (ref 3.93–6.84)
PRE RV: 1.92 L (ref 1.14–2.3)
PRE TLC: 4.08 L (ref 3.11–5.09)
RAW LLN: 3.06
RAW PRE REF: 103 %
RAW PRE: 3.15 CMH2O*S/L (ref 3.06–3.06)
RAW REF: 3.06
RV LLN: 1.14
RV PRE REF: 111.5 %
RV REF: 1.72
RVTLC LLN: 31
RVTLC PRE REF: 116.5 %
RVTLC PRE: 47.02 % (ref 30.79–49.97)
RVTLC REF: 40
TLC LLN: 3.11
TLC PRE REF: 99.5 %
TLC REF: 4.1
VC LLN: 1.89
VC PRE REF: 85.3 %
VC PRE: 2.16 L (ref 1.89–3.2)
VC REF: 2.53

## 2023-06-13 ENCOUNTER — OFFICE VISIT (OUTPATIENT)
Dept: INTERNAL MEDICINE | Facility: CLINIC | Age: 63
End: 2023-06-13
Payer: MEDICAID

## 2023-06-13 VITALS
HEART RATE: 65 BPM | DIASTOLIC BLOOD PRESSURE: 82 MMHG | TEMPERATURE: 99 F | WEIGHT: 183 LBS | HEIGHT: 59 IN | SYSTOLIC BLOOD PRESSURE: 138 MMHG | BODY MASS INDEX: 36.89 KG/M2 | RESPIRATION RATE: 18 BRPM

## 2023-06-13 DIAGNOSIS — E78.5 HYPERLIPIDEMIA, UNSPECIFIED HYPERLIPIDEMIA TYPE: ICD-10-CM

## 2023-06-13 DIAGNOSIS — M25.511 CHRONIC RIGHT SHOULDER PAIN: ICD-10-CM

## 2023-06-13 DIAGNOSIS — E66.9 OBESITY, UNSPECIFIED CLASSIFICATION, UNSPECIFIED OBESITY TYPE, UNSPECIFIED WHETHER SERIOUS COMORBIDITY PRESENT: ICD-10-CM

## 2023-06-13 DIAGNOSIS — G89.29 CHRONIC RIGHT SHOULDER PAIN: ICD-10-CM

## 2023-06-13 DIAGNOSIS — Z12.31 BREAST CANCER SCREENING BY MAMMOGRAM: ICD-10-CM

## 2023-06-13 DIAGNOSIS — F17.210 CIGARETTE NICOTINE DEPENDENCE WITHOUT COMPLICATION: ICD-10-CM

## 2023-06-13 DIAGNOSIS — E55.9 VITAMIN D DEFICIENCY: ICD-10-CM

## 2023-06-13 DIAGNOSIS — J44.9 CHRONIC OBSTRUCTIVE PULMONARY DISEASE, UNSPECIFIED COPD TYPE: ICD-10-CM

## 2023-06-13 DIAGNOSIS — Z13.29 THYROID DISORDER SCREEN: ICD-10-CM

## 2023-06-13 PROCEDURE — 3079F PR MOST RECENT DIASTOLIC BLOOD PRESSURE 80-89 MM HG: ICD-10-PCS | Mod: CPTII,,, | Performed by: NURSE PRACTITIONER

## 2023-06-13 PROCEDURE — 1159F MED LIST DOCD IN RCRD: CPT | Mod: CPTII,,, | Performed by: NURSE PRACTITIONER

## 2023-06-13 PROCEDURE — 3075F SYST BP GE 130 - 139MM HG: CPT | Mod: CPTII,,, | Performed by: NURSE PRACTITIONER

## 2023-06-13 PROCEDURE — 1159F PR MEDICATION LIST DOCUMENTED IN MEDICAL RECORD: ICD-10-PCS | Mod: CPTII,,, | Performed by: NURSE PRACTITIONER

## 2023-06-13 PROCEDURE — 99406 PR TOBACCO USE CESSATION INTERMEDIATE 3-10 MINUTES: ICD-10-PCS | Mod: S$PBB,,, | Performed by: NURSE PRACTITIONER

## 2023-06-13 PROCEDURE — 99406 BEHAV CHNG SMOKING 3-10 MIN: CPT | Mod: S$PBB,,, | Performed by: NURSE PRACTITIONER

## 2023-06-13 PROCEDURE — 1160F PR REVIEW ALL MEDS BY PRESCRIBER/CLIN PHARMACIST DOCUMENTED: ICD-10-PCS | Mod: CPTII,,, | Performed by: NURSE PRACTITIONER

## 2023-06-13 PROCEDURE — 99214 PR OFFICE/OUTPT VISIT, EST, LEVL IV, 30-39 MIN: ICD-10-PCS | Mod: 25,S$PBB,, | Performed by: NURSE PRACTITIONER

## 2023-06-13 PROCEDURE — 1160F RVW MEDS BY RX/DR IN RCRD: CPT | Mod: CPTII,,, | Performed by: NURSE PRACTITIONER

## 2023-06-13 PROCEDURE — 3075F PR MOST RECENT SYSTOLIC BLOOD PRESS GE 130-139MM HG: ICD-10-PCS | Mod: CPTII,,, | Performed by: NURSE PRACTITIONER

## 2023-06-13 PROCEDURE — 3079F DIAST BP 80-89 MM HG: CPT | Mod: CPTII,,, | Performed by: NURSE PRACTITIONER

## 2023-06-13 PROCEDURE — 3008F BODY MASS INDEX DOCD: CPT | Mod: CPTII,,, | Performed by: NURSE PRACTITIONER

## 2023-06-13 PROCEDURE — 99214 OFFICE O/P EST MOD 30 MIN: CPT | Mod: PBBFAC | Performed by: NURSE PRACTITIONER

## 2023-06-13 PROCEDURE — 99214 OFFICE O/P EST MOD 30 MIN: CPT | Mod: 25,S$PBB,, | Performed by: NURSE PRACTITIONER

## 2023-06-13 PROCEDURE — 3008F PR BODY MASS INDEX (BMI) DOCUMENTED: ICD-10-PCS | Mod: CPTII,,, | Performed by: NURSE PRACTITIONER

## 2023-06-13 NOTE — PROGRESS NOTES
[facility] Medicine Clinic  TRACEY Brown     Patient Name: Twila Felix   : 1960  MRN:[id number]     Chief Complaint     Chief Complaint   Patient presents with    Results        History of Present Illness     62 year old white female, presents in clinic for f/u LDCT, PFT, and XR right shoulder.   Xray right shoulder displays no acute finding; difficulty lifting arm above head is improved since completing medrol mera.  Declines pneumonia vaccine today.    PMH HLD, HTN, PVD, DIANA on CPAP, COPD, tobacco user 1/2ppd, depression, chronic right knee pain. Mood stable.Taking Effexor 75mg and 37.5mg daily.Was tried on hctz and losartan in the past but stopped due to it dropping her too low. Lisinopril caused angioedema. Enrolled in smoking cessation classes/medications. Following Dr. Hannah for peripheral vascular reflux, left leg venous procedure on 2023. Pt is using CPAP nightly, benefiting from use and would like to continue use of CPAP. Sleep study 3 yrs prior.     Denies chest pain, shortness of breath, cough, fever, headache, dizziness, weakness, abdominal pain, nausea, vomiting, diarrhea, constipation, dysuria, SI, HI, anxiety.     MMG 2023; BIRADS 1    Following [facility] GYN  Cologuard 10/2022 positive, pt is scheduled for colonoscopy at [facility] on 2023  LDCT 2023: Lung-RADS Category:  2 - Benign Appearance or Behavior - continue annual screening with LDCT in 12 months.There are changes seen consistent with COPD in the lungs bilaterally  PFT 2023:  moderate restriction, no significant bronchodilator response.        Review of Systems     Review of Systems   Constitutional: Negative.    HENT: Negative.     Eyes: Negative.    Respiratory: Negative.     Cardiovascular: Negative.    Gastrointestinal: Negative.    Endocrine: Negative.    Genitourinary: Negative.    Musculoskeletal: Negative.    Integumentary:  Negative.   Allergic/Immunologic: Negative.    Neurological: Negative.   "  Hematological: Negative.    Psychiatric/Behavioral: Negative.     All other systems reviewed and are negative.     Physical Examination     Visit Vitals  /82 (BP Location: Right arm, Patient Position: Sitting, BP Method: Large (Automatic))   Pulse 65   Temp 98.5 °F (36.9 °C) (Oral)   Resp 18   Ht 4' 11" (1.499 m)   Wt 83 kg (183 lb)   BMI 36.96 kg/m²        BP Readings from Last 6 Encounters:   06/13/23 138/82   05/11/23 138/76   01/11/23 136/84   10/05/21 121/82   ]    Wt Readings from Last 6 Encounters:   06/13/23 83 kg (183 lb)   05/11/23 82.3 kg (181 lb 6.4 oz)   01/11/23 79.4 kg (175 lb)   10/05/21 77.4 kg (170 lb 10.2 oz)   ]      Physical Exam  Vitals and nursing note reviewed.   Constitutional:       Appearance: Normal appearance.   HENT:      Head: Normocephalic and atraumatic.      Right Ear: Tympanic membrane, ear canal and external ear normal.      Left Ear: Tympanic membrane, ear canal and external ear normal.      Nose: Nose normal.      Mouth/Throat:      Mouth: Mucous membranes are moist.      Pharynx: Oropharynx is clear.   Eyes:      Extraocular Movements: Extraocular movements intact.      Conjunctiva/sclera: Conjunctivae normal.      Pupils: Pupils are equal, round, and reactive to light.   Cardiovascular:      Rate and Rhythm: Normal rate and regular rhythm.      Pulses: Normal pulses.      Heart sounds: Normal heart sounds.   Pulmonary:      Effort: Pulmonary effort is normal.      Breath sounds: Normal breath sounds.   Abdominal:      General: Abdomen is flat. Bowel sounds are normal.      Palpations: Abdomen is soft.   Musculoskeletal:         General: Normal range of motion.      Cervical back: Normal range of motion and neck supple.   Skin:     General: Skin is warm and dry.      Capillary Refill: Capillary refill takes less than 2 seconds.   Neurological:      General: No focal deficit present.      Mental Status: She is alert and oriented to person, place, and time. Mental status " is at baseline.   Psychiatric:         Mood and Affect: Mood normal.         Behavior: Behavior normal.         Thought Content: Thought content normal.         Judgment: Judgment normal.        Labs / Imaging     Chemistry:  Lab Results   Component Value Date     05/10/2023    K 4.8 05/10/2023    CHLORIDE 108 (H) 05/10/2023    BUN 11.0 05/10/2023    CREATININE 0.65 05/10/2023    EGFRNORACEVR >60 05/10/2023    GLUCOSE 95 05/10/2023    CALCIUM 9.3 05/10/2023    ALKPHOS 83 05/10/2023    LABPROT 6.7 05/10/2023    ALBUMIN 3.7 05/10/2023    BILIDIR 0.2 03/10/2022    IBILI 0.20 03/10/2022    AST 31 05/10/2023    ALT 28 05/10/2023    SYETTEAP22PT 42.1 01/10/2023        Lab Results   Component Value Date    HGBA1C 5.4 12/05/2020        Hematology:  Lab Results   Component Value Date    WBC 6.55 05/10/2023    RBC 4.12 (L) 05/10/2023    HGB 13.4 05/10/2023    HCT 41.4 05/10/2023    .5 (H) 05/10/2023    MCH 32.5 (H) 05/10/2023    MCHC 32.4 (L) 05/10/2023    RDW 13.4 05/10/2023     05/10/2023    MPV 12.7 (H) 05/10/2023        Lipid Panel:  Lab Results   Component Value Date    CHOL 185 05/10/2023    HDL 39 05/10/2023    .00 05/10/2023    TRIG 123 05/10/2023    TOTALCHOLEST 5 05/10/2023        Urine:  Lab Results   Component Value Date    COLORUA Light-Yellow (A) 07/14/2022    APPEARANCEUA Clear 07/14/2022    SGUA 1.023 07/14/2022    PHUA 5.0 07/14/2022    PROTEINUA Negative 07/14/2022    GLUCOSEUA Normal 07/14/2022    KETONESUA Negative 07/14/2022    BLOODUA Negative 07/14/2022    NITRITESUA Negative 07/14/2022    LEUKOCYTESUR Negative 07/14/2022    RBCUA 0-5 07/14/2022    WBCUA 0-5 07/14/2022    BACTERIA None Seen 07/14/2022    SQEPUA Occ (A) 07/14/2022    HYALINECASTS 0-2 (A) 07/14/2022          Assessment       ICD-10-CM ICD-9-CM   1. Chronic obstructive pulmonary disease, unspecified COPD type  J44.9 496   2. Hyperlipidemia, unspecified hyperlipidemia type  E78.5 272.4   3. Chronic right shoulder  pain  M25.511 719.41    G89.29 338.29   4. Obesity, unspecified classification, unspecified obesity type, unspecified whether serious comorbidity present  E66.9 278.00   5. Cigarette nicotine dependence without complication [F17.210 (ICD-10-CM)]  F17.210 305.1   6. Breast cancer screening by mammogram  Z12.31 V76.12   7. Thyroid disorder screen  Z13.29 V77.0   8. Vitamin D deficiency  E55.9 268.9        Plan     1. Chronic obstructive pulmonary disease, unspecified COPD type  CT lung cancer screen displays findings of COPD. LUNG RADS 2: repeat CT annually.   PFT displays moderate restriction, no significant bronchodilator response  Following [employer] cessation program.     2. Hyperlipidemia, unspecified hyperlipidemia type  Lab Results   Component Value Date    .00 05/10/2023    HDL 39 05/10/2023    TRIG 123 05/10/2023       Cont RX LIPITOR 20 daily. Take Omega 3 daily.   Stressed importance of dietary modifications. Follow a low cholesterol, low saturated fat diet with less that 200mg of cholesterol a day.  Avoid fried foods and high saturated fats (high saturated fats less than 7% of calories).  Add Flax Seed/Fish Oil supplements to diet. Increase dietary fiber.  Regular exercise can reduce LDL and raise HDL. Stressed importance of physical activity 5 times per week for 30 minutes per day.    - CBC Auto Differential; Future  - Comprehensive Metabolic Panel; Future  - Lipid Panel; Future  - Hemoglobin A1C; Future  - Urinalysis; Future  - Urinalysis    3. Chronic right shoulder pain  Improved ROM since completion of Medrol mera. Continue ROM exercise.    4. Cigarette nicotine dependence without complication [F17.210 (ICD-10-CM)]  Smoking cessation advised. Instructed on smoking cessation program through [facility] and pharmacological interventions to aid in cessation.  >5 minutes allotted to educate patient on the harms of smoking, the urgency to quit, and the development of a plan for smoking cessation.     5. Breast  cancer screening by mammogram    - Mammo Digital Screening Bilat w/ Yuri; Future    6. Obesity, unspecified classification, unspecified obesity type, unspecified whether serious comorbidity present  Goal BMI <30.  Exercise 5 times a week for 30 minutes per day.  Avoid soda, simple sugars, excessive rice, potatoes or bread. Limit fast foods and fried foods.  Choose complex carbs in moderation (example: green vegetables, beans, oatmeal). Eat plenty of fresh fruits and vegetables with lean meats daily.  Do not skip meals. Eat a balanced portion size.  Avoid fad diets. Consider permanent healthy life style changes.       7. Thyroid disorder screen    - TSH; Future    8. Vitamin D deficiency  OTC VIT D  - Vitamin D; Future        Current Outpatient Medications   Medication Instructions    atorvastatin (LIPITOR) 20 mg, Oral, Nightly    cetirizine (ZYRTEC) 10 mg, Oral    diazePAM (VALIUM) 10 MG Tab TAKE 1 TABLET BY MOUTH PRIOR TO VEIN PROCEDURE    fluticasone propionate (FLONASE) 50 mcg/actuation nasal spray SMARTSI-2 Spray(s) Both Nares Daily    hydrOXYzine pamoate (VISTARIL) 25 mg, Oral, Nightly PRN    ibuprofen (ADVIL,MOTRIN) 800 mg, Oral    mv-mn/iron/folic acid/herb 190 (VITAMIN D3 COMPLETE ORAL)   Daily, 0 Refill(s)    venlafaxine (EFFEXOR-XR) 75 mg, Oral, Daily    venlafaxine (EFFEXOR-XR) 37.5 mg, Oral, Daily       Orders Placed This Encounter   Procedures    Mammo Digital Screening Bilat w/ Yuri    CBC Auto Differential    Comprehensive Metabolic Panel    Lipid Panel    TSH    Hemoglobin A1C    Urinalysis    Vitamin D         Future Appointments   Date Time Provider Department Center   2023  8:00 AM ROCÍO Rodriguez [facility] [place]   2023  8:30 AM TRACEY Brown [facility] [place]   10/3/2023  2:00 PM TRACEY Ornelas [facility] GYN [place]        Follow up in about 3 months (around 2023) for lab review.    Labs thoroughly reviewed with patient. Medication  refills addressed today.  RTC prn and 3 months, with labs 1 week prior to the apt.  COVID 19 precautions given to patient.  Patient voices understanding of all discharge instructions.      TRAECY Brown

## 2023-06-29 ENCOUNTER — TELEPHONE (OUTPATIENT)
Dept: SMOKING CESSATION | Facility: CLINIC | Age: 63
End: 2023-06-29
Payer: MEDICAID

## 2023-07-05 DIAGNOSIS — E66.9 OBESITY, UNSPECIFIED CLASSIFICATION, UNSPECIFIED OBESITY TYPE, UNSPECIFIED WHETHER SERIOUS COMORBIDITY PRESENT: Primary | ICD-10-CM

## 2023-07-09 NOTE — TELEPHONE ENCOUNTER
Called Ms. Herrera to inform her and no answer, left a   VM to call back to [facility]   Unknown

## 2023-07-13 ENCOUNTER — OFFICE VISIT (OUTPATIENT)
Dept: GYNECOLOGY | Facility: CLINIC | Age: 63
End: 2023-07-13
Payer: MEDICAID

## 2023-07-13 VITALS
RESPIRATION RATE: 18 BRPM | OXYGEN SATURATION: 99 % | HEART RATE: 70 BPM | BODY MASS INDEX: 36.47 KG/M2 | WEIGHT: 180.88 LBS | HEIGHT: 59 IN | SYSTOLIC BLOOD PRESSURE: 119 MMHG | TEMPERATURE: 98 F | DIASTOLIC BLOOD PRESSURE: 81 MMHG

## 2023-07-13 DIAGNOSIS — Z12.31 SCREENING MAMMOGRAM FOR BREAST CANCER: ICD-10-CM

## 2023-07-13 DIAGNOSIS — R10.2 PELVIC PAIN: ICD-10-CM

## 2023-07-13 DIAGNOSIS — Z01.419 WOMEN'S ANNUAL ROUTINE GYNECOLOGICAL EXAMINATION: Primary | ICD-10-CM

## 2023-07-13 LAB
APPEARANCE UR: CLEAR
BACTERIA #/AREA URNS AUTO: ABNORMAL /HPF
BILIRUB UR QL STRIP.AUTO: NEGATIVE
COLOR UR: YELLOW
GLUCOSE UR QL STRIP.AUTO: NORMAL
HYALINE CASTS #/AREA URNS LPF: ABNORMAL /LPF
KETONES UR QL STRIP.AUTO: NEGATIVE
LEUKOCYTE ESTERASE UR QL STRIP.AUTO: NEGATIVE
NITRITE UR QL STRIP.AUTO: NEGATIVE
PH UR STRIP.AUTO: 7.5 [PH]
PROT UR QL STRIP.AUTO: NEGATIVE
RBC #/AREA URNS AUTO: ABNORMAL /HPF
RBC UR QL AUTO: NEGATIVE
SP GR UR STRIP.AUTO: 1.02
SQUAMOUS #/AREA URNS LPF: ABNORMAL /HPF
UROBILINOGEN UR STRIP-ACNC: NORMAL
WBC #/AREA URNS AUTO: ABNORMAL /HPF

## 2023-07-13 PROCEDURE — 1159F MED LIST DOCD IN RCRD: CPT | Mod: CPTII,,, | Performed by: NURSE PRACTITIONER

## 2023-07-13 PROCEDURE — 3074F PR MOST RECENT SYSTOLIC BLOOD PRESSURE < 130 MM HG: ICD-10-PCS | Mod: CPTII,,, | Performed by: NURSE PRACTITIONER

## 2023-07-13 PROCEDURE — 87591 N.GONORRHOEAE DNA AMP PROB: CPT

## 2023-07-13 PROCEDURE — 3079F DIAST BP 80-89 MM HG: CPT | Mod: CPTII,,, | Performed by: NURSE PRACTITIONER

## 2023-07-13 PROCEDURE — 3008F BODY MASS INDEX DOCD: CPT | Mod: CPTII,,, | Performed by: NURSE PRACTITIONER

## 2023-07-13 PROCEDURE — 3079F PR MOST RECENT DIASTOLIC BLOOD PRESSURE 80-89 MM HG: ICD-10-PCS | Mod: CPTII,,, | Performed by: NURSE PRACTITIONER

## 2023-07-13 PROCEDURE — 87491 CHLMYD TRACH DNA AMP PROBE: CPT | Performed by: NURSE PRACTITIONER

## 2023-07-13 PROCEDURE — 99214 OFFICE O/P EST MOD 30 MIN: CPT | Mod: PBBFAC | Performed by: NURSE PRACTITIONER

## 2023-07-13 PROCEDURE — 99396 PREV VISIT EST AGE 40-64: CPT | Mod: S$PBB,,, | Performed by: NURSE PRACTITIONER

## 2023-07-13 PROCEDURE — 81001 URINALYSIS AUTO W/SCOPE: CPT | Performed by: NURSE PRACTITIONER

## 2023-07-13 PROCEDURE — 87624 HPV HI-RISK TYP POOLED RSLT: CPT

## 2023-07-13 PROCEDURE — 99396 PR PREVENTIVE VISIT,EST,40-64: ICD-10-PCS | Mod: S$PBB,,, | Performed by: NURSE PRACTITIONER

## 2023-07-13 PROCEDURE — 1160F RVW MEDS BY RX/DR IN RCRD: CPT | Mod: CPTII,,, | Performed by: NURSE PRACTITIONER

## 2023-07-13 PROCEDURE — 1160F PR REVIEW ALL MEDS BY PRESCRIBER/CLIN PHARMACIST DOCUMENTED: ICD-10-PCS | Mod: CPTII,,, | Performed by: NURSE PRACTITIONER

## 2023-07-13 PROCEDURE — 3008F PR BODY MASS INDEX (BMI) DOCUMENTED: ICD-10-PCS | Mod: CPTII,,, | Performed by: NURSE PRACTITIONER

## 2023-07-13 PROCEDURE — 1159F PR MEDICATION LIST DOCUMENTED IN MEDICAL RECORD: ICD-10-PCS | Mod: CPTII,,, | Performed by: NURSE PRACTITIONER

## 2023-07-13 PROCEDURE — 3074F SYST BP LT 130 MM HG: CPT | Mod: CPTII,,, | Performed by: NURSE PRACTITIONER

## 2023-07-13 NOTE — PROGRESS NOTES
"Patient ID: Twila Felix is a 62 y.o. female.    Chief Complaint: Annual Exam      Review of patient's allergies indicates:   Allergen Reactions    Lisinopril      Other reaction(s): angioedema     HPI:  Pt is  here for annual gyn exam. Last pap 2019=NIL; HPV negative . Pt is postmenopausal.Denies vaginal bleeding or discharge.Pt is not sexually active and has not been in years. Denies vaginal dryness or irritation. Pt reports intermittent abdominal cramping x one month. She does reports urinary frequency but contributes to water intake. Denies dysuria/hematuria. States has BM every other day. Pt had positive cologuard. She is scheduled for colonoscopy this month.Denies appetite changes or wt loss. Denies breast complaints. Denies fly hx of breast, ovarian, uterine, or colon cancer. Pt is smoker. Denies hx of renal stones.   Review of Systems:   Negative except for findings in HPI     Objective:   /81   Pulse 70   Temp 98.3 °F (36.8 °C)   Resp 18   Ht 4' 11" (1.499 m)   Wt 82.1 kg (180 lb 14.4 oz)   SpO2 99%   BMI 36.54 kg/m²    Physical Exam:  GENERAL: Pt is aware and alert and  in no acute distress.  BREASTS: Bilateral-No masses, nipple discharge, skin changes, or tenderness.  ABDOMEN: Soft, non tender.scar RLQ (appendectomy); vertical scar lower abdomen (csection/hernioplasty)  VULVA:  No lesions or skin changes.  URETHRA: No lesions  BLADDER: No tenderness.  VAGINA: Mucosa normal,no discharge; no lesions.  CERVIX:  no CMT, NO discharge; NO lesions  BIMANUAL EXAM:  The uterus is not enlarged, nontender, no palpable masses. John Paul adnexa reveal no evidence of masses; no fullness   SKIN: Warm and Dry  PSYCHIATRIC: Patient is awake and alert. Mood and affect are normal.    Assessment:   Women's annual routine gynecological examination  -     Liquid-Based Pap Smear, Screening Screening    Pelvic pain  -     Urinalysis, Reflex to Urine Culture    Screening mammogram for breast cancer  -     " Mammo Digital Screening Bilat w/ Yuri; Future; Expected date: 02/13/2024            1. Women's annual routine gynecological examination    2. Pelvic pain    3. Screening mammogram for breast cancer             -pap/hpv  -check ua; keep colonoscopy appt  -Contact clinic with any vaginal bleeding as this would be abnormal in postmenopausal pt.   -pt take multivitamin containing ca/vit d daily  Plan:       Follow up in about 1 year (around 7/13/2024).

## 2023-07-17 LAB — PSYCHE PATHOLOGY RESULT: NORMAL

## 2023-07-18 RX ORDER — HYDROXYZINE PAMOATE 25 MG/1
25 CAPSULE ORAL NIGHTLY PRN
Qty: 30 CAPSULE | Refills: 4 | Status: SHIPPED | OUTPATIENT
Start: 2023-07-18 | End: 2023-09-13 | Stop reason: SDUPTHER

## 2023-07-28 ENCOUNTER — ANESTHESIA EVENT (OUTPATIENT)
Dept: ENDOSCOPY | Facility: HOSPITAL | Age: 63
End: 2023-07-28
Payer: MEDICAID

## 2023-07-28 NOTE — ANESTHESIA PREPROCEDURE EVALUATION
"                                                                                                             2023  Twila Felix is a 62 y.o., female for CLN        Vitals:    23 0807 23 0912   BP: (!) 165/91    BP Location: Left arm    Patient Position: Lying    Pulse: 68    Resp: 18    Temp: 36.6 °C (97.8 °F)    TempSrc: Oral    SpO2: 95% 100%   Weight: 81.1 kg (178 lb 12.8 oz)    Height: 4' 11" (1.499 m)            Active Ambulatory Problems     Diagnosis Date Noted    Obesity 07/15/2022    Hyperlipidemia 07/15/2022    Depression 07/15/2022    Tobacco user 07/15/2022    Cigarette nicotine dependence without complication 2023     Resolved Ambulatory Problems     Diagnosis Date Noted    No Resolved Ambulatory Problems     Past Medical History:   Diagnosis Date    Anxiety disorder, unspecified     Hypertension        Past Surgical History:   Procedure Laterality Date    ABDOMINAL HERNIA REPAIR      APPENDECTOMY       SECTION  1980     SECTION  1984    TUBAL LIGATION       Lab Results   Component Value Date    WBC 6.55 05/10/2023    HGB 13.4 05/10/2023    HCT 41.4 05/10/2023     05/10/2023    CHOL 185 05/10/2023    TRIG 123 05/10/2023    HDL 39 05/10/2023    ALT 28 05/10/2023    AST 31 05/10/2023     05/10/2023    K 4.8 05/10/2023    CREATININE 0.65 05/10/2023    BUN 11.0 05/10/2023    CO2 27 05/10/2023    TSH 1.1168 2022    HGBA1C 5.4 2020           Pre-op Assessment    I have reviewed the Patient Summary Reports.     I have reviewed the Nursing Notes. I have reviewed the NPO Status.   I have reviewed the Medications.     Review of Systems  Anesthesia Hx:  No problems with previous Anesthesia  History of prior surgery of interest to airway management or planning: Denies Family Hx of Anesthesia complications.   Denies Personal Hx of Anesthesia complications.   Hematology/Oncology:  Hematology Normal   Oncology Normal "     EENT/Dental:EENT/Dental Normal   Cardiovascular:  Cardiovascular Normal     Pulmonary:  Pulmonary Normal    Renal/:  Renal/ Normal     Hepatic/GI:  Hepatic/GI Normal    Musculoskeletal:  Musculoskeletal Normal    Neurological:  Neurology Normal    Endocrine:  Endocrine Normal    Dermatological:  Skin Normal    Psych:  Psychiatric Normal           Physical Exam  General: Well nourished, Cooperative, Alert and Oriented    Airway:  Mallampati: I / I  Mouth Opening: Normal  TM Distance: Normal      Dental:  Edentulous        Anesthesia Plan  Type of Anesthesia, risks & benefits discussed:    Anesthesia Type: Gen Natural Airway  Intra-op Monitoring Plan: Standard ASA Monitors  Post Op Pain Control Plan: IV/PO Opioids PRN  (medical reason for not using multimodal pain management)  Induction:  IV  Informed Consent: Informed consent signed with the Patient and all parties understand the risks and agree with anesthesia plan.  All questions answered. Patient consented to blood products? No  ASA Score: 2  Day of Surgery Review of History & Physical: H&P Update referred to the surgeon/provider.    Ready For Surgery From Anesthesia Perspective.     .

## 2023-07-31 ENCOUNTER — HOSPITAL ENCOUNTER (OUTPATIENT)
Facility: HOSPITAL | Age: 63
Discharge: HOME | End: 2023-07-31
Attending: INTERNAL MEDICINE | Admitting: INTERNAL MEDICINE
Payer: MEDICAID

## 2023-07-31 ENCOUNTER — ANESTHESIA (OUTPATIENT)
Dept: ENDOSCOPY | Facility: HOSPITAL | Age: 63
End: 2023-07-31
Payer: MEDICAID

## 2023-07-31 DIAGNOSIS — D12.8 ADENOMATOUS RECTAL POLYP: ICD-10-CM

## 2023-07-31 DIAGNOSIS — Z12.11 ENCOUNTER FOR SCREENING COLONOSCOPY: Primary | ICD-10-CM

## 2023-07-31 DIAGNOSIS — D12.4 ADENOMATOUS POLYP OF DESCENDING COLON: ICD-10-CM

## 2023-07-31 DIAGNOSIS — D12.3 ADENOMATOUS POLYP OF TRANSVERSE COLON: ICD-10-CM

## 2023-07-31 DIAGNOSIS — R19.5 ABNORMAL FECES: ICD-10-CM

## 2023-07-31 PROCEDURE — 00811 ANES LWR INTST NDSC NOS: CPT | Performed by: INTERNAL MEDICINE

## 2023-07-31 PROCEDURE — 37000008 HC ANESTHESIA 1ST 15 MINUTES: Performed by: INTERNAL MEDICINE

## 2023-07-31 PROCEDURE — 63600175 PHARM REV CODE 636 W HCPCS: Performed by: NURSE ANESTHETIST, CERTIFIED REGISTERED

## 2023-07-31 PROCEDURE — D9220A PRA ANESTHESIA: ICD-10-PCS | Mod: ,,, | Performed by: NURSE ANESTHETIST, CERTIFIED REGISTERED

## 2023-07-31 PROCEDURE — 27201423 OPTIME MED/SURG SUP & DEVICES STERILE SUPPLY: Performed by: INTERNAL MEDICINE

## 2023-07-31 PROCEDURE — 88305 TISSUE EXAM BY PATHOLOGIST: CPT | Mod: TC | Performed by: INTERNAL MEDICINE

## 2023-07-31 PROCEDURE — 45380 PR COLONOSCOPY,BIOPSY: ICD-10-PCS | Mod: ,,, | Performed by: INTERNAL MEDICINE

## 2023-07-31 PROCEDURE — 45380 COLONOSCOPY AND BIOPSY: CPT | Mod: ,,, | Performed by: INTERNAL MEDICINE

## 2023-07-31 PROCEDURE — 45380 COLONOSCOPY AND BIOPSY: CPT | Performed by: INTERNAL MEDICINE

## 2023-07-31 PROCEDURE — 37000009 HC ANESTHESIA EA ADD 15 MINS: Performed by: INTERNAL MEDICINE

## 2023-07-31 PROCEDURE — 25000003 PHARM REV CODE 250: Performed by: NURSE ANESTHETIST, CERTIFIED REGISTERED

## 2023-07-31 PROCEDURE — D9220A PRA ANESTHESIA: Mod: ,,, | Performed by: NURSE ANESTHETIST, CERTIFIED REGISTERED

## 2023-07-31 PROCEDURE — 63600175 PHARM REV CODE 636 W HCPCS: Performed by: INTERNAL MEDICINE

## 2023-07-31 RX ORDER — SODIUM CHLORIDE, SODIUM LACTATE, POTASSIUM CHLORIDE, CALCIUM CHLORIDE 600; 310; 30; 20 MG/100ML; MG/100ML; MG/100ML; MG/100ML
INJECTION, SOLUTION INTRAVENOUS CONTINUOUS
Status: DISCONTINUED | OUTPATIENT
Start: 2023-07-31 | End: 2023-07-31 | Stop reason: HOSPADM

## 2023-07-31 RX ORDER — LIDOCAINE HYDROCHLORIDE 10 MG/ML
1 INJECTION, SOLUTION EPIDURAL; INFILTRATION; INTRACAUDAL; PERINEURAL ONCE
Status: DISCONTINUED | OUTPATIENT
Start: 2023-07-31 | End: 2023-07-31 | Stop reason: HOSPADM

## 2023-07-31 RX ORDER — PROPOFOL 10 MG/ML
INJECTION, EMULSION INTRAVENOUS
Status: DISCONTINUED | OUTPATIENT
Start: 2023-07-31 | End: 2023-07-31

## 2023-07-31 RX ORDER — LIDOCAINE HYDROCHLORIDE 20 MG/ML
INJECTION INTRAVENOUS
Status: DISCONTINUED | OUTPATIENT
Start: 2023-07-31 | End: 2023-07-31

## 2023-07-31 RX ADMIN — PROPOFOL 25 MG: 10 INJECTION, EMULSION INTRAVENOUS at 09:07

## 2023-07-31 RX ADMIN — LIDOCAINE HYDROCHLORIDE 50 MG: 20 INJECTION INTRAVENOUS at 09:07

## 2023-07-31 RX ADMIN — SODIUM CHLORIDE, POTASSIUM CHLORIDE, SODIUM LACTATE AND CALCIUM CHLORIDE: 600; 310; 30; 20 INJECTION, SOLUTION INTRAVENOUS at 08:07

## 2023-07-31 RX ADMIN — PROPOFOL 75 MG: 10 INJECTION, EMULSION INTRAVENOUS at 09:07

## 2023-07-31 RX ADMIN — PROPOFOL 50 MG: 10 INJECTION, EMULSION INTRAVENOUS at 09:07

## 2023-07-31 NOTE — PLAN OF CARE
Back from procedure. Awake and alert x 4 no acute distress. Drinking coffee. D/c instructions given and reviewed

## 2023-07-31 NOTE — TRANSFER OF CARE
Anesthesia Transfer of Care Note    Patient: Twila Felix    Procedure(s) Performed: Procedure(s) (LRB):  COLONOSCOPY, WITH POLYPECTOMY USING SNARE (N/A)  COLONOSCOPY, WITH POLYPECTOMY USING cold BIOPSY FORCEPS    Patient location: GI    Anesthesia Type: general    Post pain: adequate analgesia    Post assessment: no apparent anesthetic complications    Post vital signs: stable    Level of consciousness: awake    Nausea/Vomiting: no nausea/vomiting    Complications: none    Transfer of care protocol was followedComments: Report to Lazarus SEN      Last vitals:   134/79 p60 r20 sat 100 ra t 36

## 2023-07-31 NOTE — H&P
Colonoscopy History and Physical    Patient Name: Twila Felix  MRN: [id number]  : 1960  Date of Procedure:  2023  Referring Physician: Amanuel Herrera MD  Primary Physician: TRACEY Brown  Procedure Physician: Amanuel Herrera MD    Procedure - Colonoscopy  ASA - per anesthesia  Mallampati - per anesthesia  History of Anesthesia problems - no  Family history Anesthesia problems -  no   Plan of anesthesia - per anesthesia    Diagnosis: positive Cologuard 10/22  Chief Complaint: Same as above    HPI: Patient is an 62 y.o. female is here for the above.     Last colonoscopy: never   Family history: denies family history of colon or rectal cancer  Anticoagulation: denies    ROS:  Constitutional: No fevers, chills, No weight loss  CV: No chest pain  Pulm: No cough, No shortness of breath  GI: see HPI    Medical History:   Past Medical History:   Diagnosis Date    Anxiety disorder, unspecified     Depression     Hyperlipidemia     Hypertension        Surgical History:   Past Surgical History:   Procedure Laterality Date    ABDOMINAL HERNIA REPAIR      APPENDECTOMY       SECTION  1980     SECTION      TUBAL LIGATION         Family History:   Family History   Problem Relation Age of Onset    Hypertension Mother     Heart disease Mother     Hypertension Father    .    Social History:   Social History     Socioeconomic History    Marital status:     Number of children: 2   Occupational History    Occupation: Unemployed   Tobacco Use    Smoking status: Every Day     Current packs/day: 0.50     Average packs/day: 0.5 packs/day for 43.0 years (21.5 ttl pk-yrs)     Types: Cigarettes    Smokeless tobacco: Former   Substance and Sexual Activity    Alcohol use: Not Currently    Drug use: Never    Sexual activity: Not Currently     Social Determinants of Health     Financial Resource Strain: Low Risk  (2023)    Overall Financial Resource Strain (CARDIA)     Difficulty of  Paying Living Expenses: Not hard at all   Food Insecurity: No Food Insecurity (6/13/2023)    Hunger Vital Sign     Worried About Running Out of Food in the Last Year: Never true     Ran Out of Food in the Last Year: Never true   Transportation Needs: No Transportation Needs (6/13/2023)    PRAPARE - Transportation     Lack of Transportation (Medical): No     Lack of Transportation (Non-Medical): No   Physical Activity: Inactive (6/13/2023)    Exercise Vital Sign     Days of Exercise per Week: 0 days     Minutes of Exercise per Session: 0 min   Stress: No Stress Concern Present (6/13/2023)    [employer] [place] [employer] - Occupational Stress Questionnaire     Feeling of Stress : Not at all   Social Connections: Moderately Isolated (6/13/2023)    Social Connection and Isolation Panel [NHANES]     Frequency of Communication with Friends and Family: More than three times a week     Attends Faith Services: More than 4 times per year     Active Member of Clubs or Organizations: No     Attends Club or Organization Meetings: Never     Marital Status:    Housing Stability: Low Risk  (6/13/2023)    Housing Stability Vital Sign     Unable to Pay for Housing in the Last Year: No     Number of Places Lived in the Last Year: 1     Unstable Housing in the Last Year: No       Review of patient's allergies indicates:   Allergen Reactions    Lisinopril      Other reaction(s): angioedema       Medications:   Medications Prior to Admission   Medication Sig Dispense Refill Last Dose    atorvastatin (LIPITOR) 20 MG tablet Take 1 tablet (20 mg total) by mouth every evening. 90 tablet 1 7/30/2023    cetirizine (ZYRTEC) 10 MG tablet Take 10 mg by mouth.   Past Month    ibuprofen (ADVIL,MOTRIN) 800 MG tablet Take 800 mg by mouth.   Past Month    mv-mn/iron/folic acid/herb 190 (VITAMIN D3 COMPLETE ORAL)   Daily, 0 Refill(s)   7/30/2023    venlafaxine (EFFEXOR-XR) 37.5 MG 24 hr capsule Take 1 capsule (37.5 mg total) by  "mouth once daily. 90 capsule 1 2023    venlafaxine (EFFEXOR-XR) 75 MG 24 hr capsule Take 1 capsule (75 mg total) by mouth once daily. 90 capsule 1 2023    diazePAM (VALIUM) 10 MG Tab TAKE 1 TABLET BY MOUTH PRIOR TO VEIN PROCEDURE       fluticasone propionate (FLONASE) 50 mcg/actuation nasal spray SMARTSI-2 Spray(s) Both Nares Daily   More than a month    hydrOXYzine pamoate (VISTARIL) 25 MG Cap Take 1 capsule (25 mg total) by mouth nightly as needed (sleep). 30 capsule 4 More than a month       Physical Exam:    Vital Signs:   Vitals:    23 0807   BP: (!) 165/91   Pulse: 68   Resp: 18   Temp: 97.8 °F (36.6 °C)     BP (!) 165/91 (BP Location: Left arm, Patient Position: Lying)   Pulse 68   Temp 97.8 °F (36.6 °C) (Oral)   Resp 18   Ht 4' 11" (1.499 m)   Wt 81.1 kg (178 lb 12.8 oz)   SpO2 95%   BMI 36.11 kg/m²     General: Well appearing in no acute distress  Lungs: Normal work of breathing on room air  Heart: Regular rate and rhythm  Abdomen: Soft, non-tender, no masses, no organomegaly    Labs:  Lab Results   Component Value Date    WBC 6.55 05/10/2023    HGB 13.4 05/10/2023    HCT 41.4 05/10/2023    .5 (H) 05/10/2023     05/10/2023     No results found for: "INR", "PT", "APTT"  Lab Results   Component Value Date     05/10/2023    K 4.8 05/10/2023    CO2 27 05/10/2023    BUN 11.0 05/10/2023    CREATININE 0.65 05/10/2023    LABPROT 6.7 05/10/2023    ALBUMIN 3.7 05/10/2023    BILITOT 0.5 05/10/2023    ALKPHOS 83 05/10/2023    ALT 28 05/10/2023    AST 31 05/10/2023       Assessment and Plan:  Twila Felix is a 62 y.o. year old female here for colonoscopy.     History reviewed, vital signs satisfactory, cardiopulmonary status satisfactory.  I have explained the sedation options, risks, benefits, and alternatives of this endoscopic procedure to the patient including but not limited to bleeding, inflammation, infection, perforation, and death.  All questions were " answered and the patient consented to proceed with procedure as planned. The patient is deemed an appropriate candidate for the sedation as planned.      Maame Gant MD  [employer] General Surgery, PGY-2  7/31/2023  8:10 AM

## 2023-07-31 NOTE — PROVATION PATIENT INSTRUCTIONS
Discharge Summary/Instructions after an Endoscopic Procedure  Patient Name: Twila Felix  Patient MRN: [id number]  Patient YOB: 1960 Monday, July 31, 2023  Amanuel Herrera MD  Dear patient,  As a result of recent federal legislation (The Federal Cures Act), you may   receive lab or pathology results from your procedure in your [facility]   account before your physician is able to contact you. Your physician or   their representative will relay the results to you with their   recommendations at their soonest availability.  Thank you,  RESTRICTIONS:  During your procedure today, you received medications for sedation.  These   medications may affect your judgment, balance and coordination.  Therefore,   for 24 hours, you have the following restrictions:   - DO NOT drive a car, operate machinery, make legal/financial decisions,   sign important papers or drink alcohol.    ACTIVITY:  Today: no heavy lifting, straining or running due to procedural   sedation/anesthesia.  The following day: return to full activity including work.  DIET:  Eat and drink normally unless instructed otherwise.     TREATMENT FOR COMMON SIDE EFFECTS:  - Mild abdominal pain, nausea, belching, bloating or excessive gas:  rest,   eat lightly and use a heating pad.  - Sore Throat: treat with throat lozenges and/or gargle with warm salt   water.  - Because air was used during the procedure, expelling large amounts of air   from your rectum or belching is normal.  - If a bowel prep was taken, you may not have a bowel movement for 1-3 days.    This is normal.  SYMPTOMS TO WATCH FOR AND REPORT TO YOUR PHYSICIAN:  1. Abdominal pain or bloating, other than gas cramps.  2. Chest pain.  3. Back pain.  4. Signs of infection such as: chills or fever occurring within 24 hours   after the procedure.  5. Rectal bleeding, which would show as bright red, maroon, or black stools.   (A tablespoon of blood from the rectum is not serious, especially if    hemorrhoids are present.)  6. Vomiting.  7. Weakness or dizziness.  GO DIRECTLY TO THE NEAREST EMERGENCY ROOM IF YOU HAVE ANY OF THE FOLLOWING:      Difficulty breathing              Chills and/or fever over 101 F   Persistent vomiting and/or vomiting blood   Severe abdominal pain   Severe chest pain   Black, tarry stools   Bleeding- more than one tablespoon   Any other symptom or condition that you feel may need urgent attention  Your doctor recommends these additional instructions:  If any biopsies were taken, your doctors clinic will contact you in 1 to 2   weeks with any results.  - Discharge patient to home (ambulatory).   - Resume previous diet today.   - Repeat colonoscopy in 5 years for surveillance.  For questions, problems or results please call your physician - Amanuel Herrera MD at Work:  [phone].  [facility] , EMERGENCY ROOM PHONE NUMBER: [phone]  IF A COMPLICATION OR EMERGENCY SITUATION ARISES AND YOU ARE UNABLE TO REACH   YOUR PHYSICIAN - GO DIRECTLY TO THE EMERGENCY ROOM.  MD Amanuel Tineo MD  7/31/2023 9:55:32 AM  This report has been verified and signed electronically.  Dear patient,  As a result of recent federal legislation (The Federal Cures Act), you may   receive lab or pathology results from your procedure in your [facility]   account before your physician is able to contact you. Your physician or   their representative will relay the results to you with their   recommendations at their soonest availability.  Thank you,  PROVATION

## 2023-07-31 NOTE — ANESTHESIA POSTPROCEDURE EVALUATION
Anesthesia Post Evaluation    Patient: Twila Felix    Procedure(s) Performed: Procedure(s) (LRB):  COLONOSCOPY, WITH POLYPECTOMY USING SNARE (N/A)  COLONOSCOPY, WITH POLYPECTOMY USING cold BIOPSY FORCEPS    Final Anesthesia Type: general      Patient location during evaluation: GI PACU  Patient participation: Yes- Able to Participate  Level of consciousness: awake and alert  Post-procedure vital signs: reviewed and stable  Pain management: adequate  Airway patency: patent    PONV status at discharge: No PONV  Anesthetic complications: no      Cardiovascular status: hemodynamically stable  Respiratory status: unassisted, spontaneous ventilation and room air  Follow-up not needed.          Vitals Value Taken Time   /91 07/31/23 0807   Temp 36.6 °C (97.8 °F) 07/31/23 0807   Pulse 68 07/31/23 0807   Resp 18 07/31/23 0807   SpO2 100 % 07/31/23 0912         No case tracking events are documented in the log.      Pain/Rm Score: No data recorded

## 2023-08-01 VITALS
SYSTOLIC BLOOD PRESSURE: 125 MMHG | DIASTOLIC BLOOD PRESSURE: 73 MMHG | OXYGEN SATURATION: 96 % | HEIGHT: 59 IN | TEMPERATURE: 98 F | HEART RATE: 58 BPM | WEIGHT: 178.81 LBS | RESPIRATION RATE: 20 BRPM | BODY MASS INDEX: 36.05 KG/M2

## 2023-08-02 LAB
ESTROGEN SERPL-MCNC: NORMAL PG/ML
INSULIN SERPL-ACNC: NORMAL U[IU]/ML
LAB AP CLINICAL INFORMATION: NORMAL
LAB AP GROSS DESCRIPTION: NORMAL
LAB AP REPORT FOOTNOTES: NORMAL
T3RU NFR SERPL: NORMAL %

## 2023-08-03 NOTE — DISCHARGE SUMMARY
[facility]  Discharge Note  Short Stay    Procedure(s) (LRB):  COLONOSCOPY, WITH POLYPECTOMY USING SNARE (N/A)  COLONOSCOPY, WITH POLYPECTOMY USING cold BIOPSY FORCEPS  The procedure of colonoscopy was explained to the patient and consent obtained.  General IV anesthesia was provided by anesthesia Services.  Rectal exam performed and normal.  The Olympus videocolonoscope introduced per rectum and advanced around the colon to the cecum.  The ileocecal valve and appendiceal orifice were identified and normal.  On withdrawal of the endoscope 3 polyps were identified in the mid descending, proximal transverse colon and rectum 1st 2 were removed with cold snare, the latter with cold biopsy forceps.  The endoscope was withdrawn.  Withdrawal time cecum to rectum 22 minutes.  The procedure was well tolerated and the patient returned to the recovery area for observation.    Discharge plan-the patient will resume her normal diet today and normal activities tomorrow.  A follow-up colonoscopy in 5 years is recommended.    OUTCOME: Patient tolerated treatment/procedure well without complication and is now ready for discharge.    DISPOSITION: Home or Self Care    FINAL DIAGNOSIS:  <principal problem not specified>    FOLLOWUP: In clinic    DISCHARGE INSTRUCTIONS:    Discharge Procedure Orders   Diet Adult Regular     Diet general     Notify your health care provider if you experience any of the following:  persistent nausea and vomiting or diarrhea     Notify your health care provider if you experience any of the following:  severe uncontrolled pain     Call MD for:  temperature >100.4     Call MD for:  persistent nausea and vomiting     Call MD for:  severe uncontrolled pain     Call MD for:  difficulty breathing, headache or visual disturbances     Activity as tolerated   Order Comments: No driving today     Activity as tolerated         Clinical Reference Documents Added to Patient Instructions          Document    COLONOSCOPY DISCHARGE INSTRUCTIONS (ENGLISH)            TIME SPENT ON DISCHARGE: 10 minutes

## 2023-08-03 NOTE — DISCHARGE SUMMARY
[facility]  Discharge Note  Short Stay    Procedure(s) (LRB):  COLONOSCOPY, WITH POLYPECTOMY USING SNARE (N/A)  COLONOSCOPY, WITH POLYPECTOMY USING cold BIOPSY FORCEPS  Colonoscopy was performed without difficulty.  General IV anesthesia was provided by anesthesia Services.  Ileocecal valve and appendiceal orifice were identified and normal.  There was a 3 mm sessile polyp present in the proximal transverse colon, a 2 mm sessile polyp in the mid descending colon and a 2 mm sessile polyp in the distal rectum.  All were removed.  The endoscope was withdrawn.  The procedure was well tolerated and the patient returned to the recovery area for observation.    Discharge plan the patient will resume a normal diet today and normal activities tomorrow.  A follow-up colonoscopy in 5 years is recommended.    OUTCOME: Patient tolerated treatment/procedure well without complication and is now ready for discharge.    DISPOSITION: Home or Self Care    FINAL DIAGNOSIS:  <principal problem not specified>    FOLLOWUP: In clinic    DISCHARGE INSTRUCTIONS:    Discharge Procedure Orders   Diet Adult Regular     Diet general     Notify your health care provider if you experience any of the following:  persistent nausea and vomiting or diarrhea     Notify your health care provider if you experience any of the following:  severe uncontrolled pain     Call MD for:  temperature >100.4     Call MD for:  persistent nausea and vomiting     Call MD for:  severe uncontrolled pain     Call MD for:  difficulty breathing, headache or visual disturbances     Activity as tolerated   Order Comments: No driving today     Activity as tolerated         Clinical Reference Documents Added to Patient Instructions         Document    COLONOSCOPY DISCHARGE INSTRUCTIONS (ENGLISH)            TIME SPENT ON DISCHARGE: 10 minutes

## 2023-08-04 ENCOUNTER — TELEPHONE (OUTPATIENT)
Dept: ENDOSCOPY | Facility: HOSPITAL | Age: 63
End: 2023-08-04
Payer: MEDICAID

## 2023-08-04 NOTE — TELEPHONE ENCOUNTER
----- Message from Amanuel Herrera MD sent at 8/3/2023  7:26 AM CDT -----  Please let patient know that polyps removed were adenoma polyps.   These types of polyps are not cancer, but they are pre-cancerous (meaning that they can turn into cancers). Removing the polyp removes the risk of it becoming cancer.  Repeat colonoscopy is recommended in 5 years.

## 2023-08-10 NOTE — PROCEDURES
"Twila Felix is a 63 y.o. female patient.    Temp: 97.8 °F (36.6 °C) (07/31/23 0807)  Pulse: (!) 58 (07/31/23 1018)  Resp: 20 (07/31/23 0953)  BP: 125/73 (07/31/23 1018)  SpO2: 96 % (07/31/23 1018)  Weight: 81.1 kg (178 lb 12.8 oz) (07/31/23 0807)  Height: 4' 11" (149.9 cm) (07/31/23 0807)       Procedures    8/10/2023    "

## 2023-08-14 ENCOUNTER — CLINICAL SUPPORT (OUTPATIENT)
Dept: SMOKING CESSATION | Facility: CLINIC | Age: 63
End: 2023-08-14
Payer: COMMERCIAL

## 2023-08-14 DIAGNOSIS — F17.200 NICOTINE DEPENDENCE: Primary | ICD-10-CM

## 2023-08-14 PROCEDURE — 99404 PREV MED CNSL INDIV APPRX 60: CPT | Mod: S$GLB,,,

## 2023-08-14 PROCEDURE — 99404 PR PREVENT COUNSEL,INDIV,60 MIN: ICD-10-PCS | Mod: S$GLB,,,

## 2023-08-14 RX ORDER — IBUPROFEN 200 MG
1 TABLET ORAL DAILY
Qty: 28 PATCH | Refills: 0 | Status: SHIPPED | OUTPATIENT
Start: 2023-08-14 | End: 2023-09-25 | Stop reason: SDUPTHER

## 2023-08-14 RX ORDER — DM/P-EPHED/ACETAMINOPH/DOXYLAM 30-7.5/3
2 LIQUID (ML) ORAL
Qty: 72 LOZENGE | Refills: 0 | Status: SHIPPED | OUTPATIENT
Start: 2023-08-14 | End: 2023-10-25

## 2023-08-14 NOTE — PROGRESS NOTES
Patient will be participating in tobacco cessation meetings and will begin the prescribed tobacco cessation medication regimen of 21 mg nicotine patch QD and 2 mg nicotine lozenge PRN (1-2 per hour in place of cigarettes). Patient currently smokes 20-25 cigarettes per day.  Discussed the role of tobacco cessation program, role of nicotine replacement therapy (NRT) and behavioral changes to assist the patient to reach her goal of being tobacco free. Education and instruction on the role of the NRT, usage and proper placement of the patch and lozenge. Patient verbalized understanding and willingness to use the patch and lozenge. Pt started on rate reduction and wait time of 15 min prior to smoking. Pt's exhaled carbon monoxide level was 16 ppm as per [employer]. (non- smoker = 0-5 ppm.)

## 2023-09-05 ENCOUNTER — CLINICAL SUPPORT (OUTPATIENT)
Dept: SMOKING CESSATION | Facility: CLINIC | Age: 63
End: 2023-09-05
Payer: COMMERCIAL

## 2023-09-05 DIAGNOSIS — F17.200 NICOTINE DEPENDENCE: Primary | ICD-10-CM

## 2023-09-05 PROCEDURE — 99402 PREV MED CNSL INDIV APPRX 30: CPT | Mod: S$GLB,,,

## 2023-09-05 PROCEDURE — 99402 PR PREVENT COUNSEL,INDIV,30 MIN: ICD-10-PCS | Mod: S$GLB,,,

## 2023-09-05 NOTE — PROGRESS NOTES
Individual Follow-Up Form    9/5/2023    Quit Date:     Clinical Status of Patient: Outpatient    Continuing Medication: yes  Patches or Nicotine Lozenges     Target Symptoms: Withdrawal and medication side effects. The following were  rated moderate (3) to severe (4) on TCRS:  Moderate (3): none  Severe (4): none    Comments: Spoke with pt via phone regarding smoking cessation progress.  Pt is smoking 15-20 cigarettes per day.  Pt remains on tobacco cessation regimen of 21 mg nicotine patch QD and 2 mg nicotine lozenge PRN.  No adverse effects noted at this time.  Pt stated that she smokes due to stress.  Pt is taking care of her mother who has dementia.  Discussed alternative ways to deal with stress rather than smoking a cigarette.  Also discussed finding a new hobby or something to do for distraction.  Reviewed learned addiction model, personal reasons for quitting, medications, goals, quit date. Pt will move her cigarettes from the table and place them inside of a cabinet or drawer. Pt will not take the entire pack of cigarettes outdoors when she smokes.  Pt will continue to work on rate reduction.      Diagnosis: F17.200    Next Visit: 2 weeks

## 2023-09-12 ENCOUNTER — LAB VISIT (OUTPATIENT)
Dept: LAB | Facility: HOSPITAL | Age: 63
End: 2023-09-12
Attending: NURSE PRACTITIONER
Payer: MEDICAID

## 2023-09-12 DIAGNOSIS — Z13.29 THYROID DISORDER SCREEN: ICD-10-CM

## 2023-09-12 DIAGNOSIS — E55.9 VITAMIN D DEFICIENCY: ICD-10-CM

## 2023-09-12 DIAGNOSIS — E78.5 HYPERLIPIDEMIA, UNSPECIFIED HYPERLIPIDEMIA TYPE: ICD-10-CM

## 2023-09-12 LAB
ALBUMIN SERPL-MCNC: 3.6 G/DL (ref 3.4–4.8)
ALBUMIN/GLOB SERPL: 1.1 RATIO (ref 1.1–2)
ALP SERPL-CCNC: 99 UNIT/L (ref 40–150)
ALT SERPL-CCNC: 29 UNIT/L (ref 0–55)
AST SERPL-CCNC: 28 UNIT/L (ref 5–34)
BASOPHILS # BLD AUTO: 0.02 X10(3)/MCL
BASOPHILS NFR BLD AUTO: 0.3 %
BILIRUB SERPL-MCNC: 0.5 MG/DL
BUN SERPL-MCNC: 18.1 MG/DL (ref 9.8–20.1)
CALCIUM SERPL-MCNC: 9.5 MG/DL (ref 8.4–10.2)
CHLORIDE SERPL-SCNC: 108 MMOL/L (ref 98–107)
CHOLEST SERPL-MCNC: 146 MG/DL
CHOLEST/HDLC SERPL: 4 {RATIO} (ref 0–5)
CO2 SERPL-SCNC: 28 MMOL/L (ref 23–31)
CREAT SERPL-MCNC: 0.66 MG/DL (ref 0.55–1.02)
DEPRECATED CALCIDIOL+CALCIFEROL SERPL-MC: 45 NG/ML (ref 30–80)
EOSINOPHIL # BLD AUTO: 0.23 X10(3)/MCL (ref 0–0.9)
EOSINOPHIL NFR BLD AUTO: 3.4 %
ERYTHROCYTE [DISTWIDTH] IN BLOOD BY AUTOMATED COUNT: 13.1 % (ref 11.5–17)
EST. AVERAGE GLUCOSE BLD GHB EST-MCNC: 114 MG/DL
GFR SERPLBLD CREATININE-BSD FMLA CKD-EPI: >60 MLS/MIN/1.73/M2
GLOBULIN SER-MCNC: 3.3 GM/DL (ref 2.4–3.5)
GLUCOSE SERPL-MCNC: 89 MG/DL (ref 82–115)
HBA1C MFR BLD: 5.6 %
HCT VFR BLD AUTO: 41.9 % (ref 37–47)
HDLC SERPL-MCNC: 40 MG/DL (ref 35–60)
HGB BLD-MCNC: 13.3 G/DL (ref 12–16)
IMM GRANULOCYTES # BLD AUTO: 0.02 X10(3)/MCL (ref 0–0.04)
IMM GRANULOCYTES NFR BLD AUTO: 0.3 %
LDLC SERPL CALC-MCNC: 91 MG/DL (ref 50–140)
LYMPHOCYTES # BLD AUTO: 1.58 X10(3)/MCL (ref 0.6–4.6)
LYMPHOCYTES NFR BLD AUTO: 23.4 %
MCH RBC QN AUTO: 31.4 PG (ref 27–31)
MCHC RBC AUTO-ENTMCNC: 31.7 G/DL (ref 33–36)
MCV RBC AUTO: 99.1 FL (ref 80–94)
MONOCYTES # BLD AUTO: 0.64 X10(3)/MCL (ref 0.1–1.3)
MONOCYTES NFR BLD AUTO: 9.5 %
NEUTROPHILS # BLD AUTO: 4.26 X10(3)/MCL (ref 2.1–9.2)
NEUTROPHILS NFR BLD AUTO: 63.1 %
NRBC BLD AUTO-RTO: 0 %
PLATELET # BLD AUTO: 157 X10(3)/MCL (ref 130–400)
PMV BLD AUTO: 12.5 FL (ref 7.4–10.4)
POTASSIUM SERPL-SCNC: 4.8 MMOL/L (ref 3.5–5.1)
PROT SERPL-MCNC: 6.9 GM/DL (ref 5.8–7.6)
RBC # BLD AUTO: 4.23 X10(6)/MCL (ref 4.2–5.4)
SODIUM SERPL-SCNC: 146 MMOL/L (ref 136–145)
TRIGL SERPL-MCNC: 74 MG/DL (ref 37–140)
TSH SERPL-ACNC: 1.97 UIU/ML (ref 0.35–4.94)
VLDLC SERPL CALC-MCNC: 15 MG/DL
WBC # SPEC AUTO: 6.75 X10(3)/MCL (ref 4.5–11.5)

## 2023-09-12 PROCEDURE — 36415 COLL VENOUS BLD VENIPUNCTURE: CPT

## 2023-09-12 PROCEDURE — 80053 COMPREHEN METABOLIC PANEL: CPT

## 2023-09-12 PROCEDURE — 82306 VITAMIN D 25 HYDROXY: CPT

## 2023-09-12 PROCEDURE — 85025 COMPLETE CBC W/AUTO DIFF WBC: CPT

## 2023-09-12 PROCEDURE — 80061 LIPID PANEL: CPT

## 2023-09-12 PROCEDURE — 84443 ASSAY THYROID STIM HORMONE: CPT

## 2023-09-12 PROCEDURE — 83036 HEMOGLOBIN GLYCOSYLATED A1C: CPT

## 2023-09-13 ENCOUNTER — OFFICE VISIT (OUTPATIENT)
Dept: INTERNAL MEDICINE | Facility: CLINIC | Age: 63
End: 2023-09-13
Payer: MEDICAID

## 2023-09-13 VITALS
RESPIRATION RATE: 16 BRPM | TEMPERATURE: 98 F | HEIGHT: 59 IN | BODY MASS INDEX: 37.09 KG/M2 | WEIGHT: 184 LBS | HEART RATE: 60 BPM | SYSTOLIC BLOOD PRESSURE: 138 MMHG | DIASTOLIC BLOOD PRESSURE: 84 MMHG

## 2023-09-13 DIAGNOSIS — J44.9 CHRONIC OBSTRUCTIVE PULMONARY DISEASE, UNSPECIFIED COPD TYPE: ICD-10-CM

## 2023-09-13 DIAGNOSIS — Z23 NEED FOR INFLUENZA VACCINATION: ICD-10-CM

## 2023-09-13 DIAGNOSIS — F17.210 CIGARETTE NICOTINE DEPENDENCE WITHOUT COMPLICATION: ICD-10-CM

## 2023-09-13 DIAGNOSIS — E78.5 HYPERLIPIDEMIA, UNSPECIFIED HYPERLIPIDEMIA TYPE: ICD-10-CM

## 2023-09-13 DIAGNOSIS — E66.9 OBESITY, UNSPECIFIED CLASSIFICATION, UNSPECIFIED OBESITY TYPE, UNSPECIFIED WHETHER SERIOUS COMORBIDITY PRESENT: ICD-10-CM

## 2023-09-13 DIAGNOSIS — E55.9 VITAMIN D DEFICIENCY: ICD-10-CM

## 2023-09-13 PROCEDURE — 3075F PR MOST RECENT SYSTOLIC BLOOD PRESS GE 130-139MM HG: ICD-10-PCS | Mod: CPTII,,, | Performed by: NURSE PRACTITIONER

## 2023-09-13 PROCEDURE — 3008F BODY MASS INDEX DOCD: CPT | Mod: CPTII,,, | Performed by: NURSE PRACTITIONER

## 2023-09-13 PROCEDURE — 1159F MED LIST DOCD IN RCRD: CPT | Mod: CPTII,,, | Performed by: NURSE PRACTITIONER

## 2023-09-13 PROCEDURE — 1160F RVW MEDS BY RX/DR IN RCRD: CPT | Mod: CPTII,,, | Performed by: NURSE PRACTITIONER

## 2023-09-13 PROCEDURE — 3044F PR MOST RECENT HEMOGLOBIN A1C LEVEL <7.0%: ICD-10-PCS | Mod: CPTII,,, | Performed by: NURSE PRACTITIONER

## 2023-09-13 PROCEDURE — 99214 OFFICE O/P EST MOD 30 MIN: CPT | Mod: 25,S$PBB,, | Performed by: NURSE PRACTITIONER

## 2023-09-13 PROCEDURE — 3008F PR BODY MASS INDEX (BMI) DOCUMENTED: ICD-10-PCS | Mod: CPTII,,, | Performed by: NURSE PRACTITIONER

## 2023-09-13 PROCEDURE — 1160F PR REVIEW ALL MEDS BY PRESCRIBER/CLIN PHARMACIST DOCUMENTED: ICD-10-PCS | Mod: CPTII,,, | Performed by: NURSE PRACTITIONER

## 2023-09-13 PROCEDURE — 3044F HG A1C LEVEL LT 7.0%: CPT | Mod: CPTII,,, | Performed by: NURSE PRACTITIONER

## 2023-09-13 PROCEDURE — 90471 IMMUNIZATION ADMIN: CPT | Mod: PBBFAC

## 2023-09-13 PROCEDURE — 99406 PR TOBACCO USE CESSATION INTERMEDIATE 3-10 MINUTES: ICD-10-PCS | Mod: S$PBB,,, | Performed by: NURSE PRACTITIONER

## 2023-09-13 PROCEDURE — 3079F DIAST BP 80-89 MM HG: CPT | Mod: CPTII,,, | Performed by: NURSE PRACTITIONER

## 2023-09-13 PROCEDURE — 3075F SYST BP GE 130 - 139MM HG: CPT | Mod: CPTII,,, | Performed by: NURSE PRACTITIONER

## 2023-09-13 PROCEDURE — 3079F PR MOST RECENT DIASTOLIC BLOOD PRESSURE 80-89 MM HG: ICD-10-PCS | Mod: CPTII,,, | Performed by: NURSE PRACTITIONER

## 2023-09-13 PROCEDURE — 99406 BEHAV CHNG SMOKING 3-10 MIN: CPT | Mod: S$PBB,,, | Performed by: NURSE PRACTITIONER

## 2023-09-13 PROCEDURE — 99214 OFFICE O/P EST MOD 30 MIN: CPT | Mod: PBBFAC | Performed by: NURSE PRACTITIONER

## 2023-09-13 PROCEDURE — 99214 PR OFFICE/OUTPT VISIT, EST, LEVL IV, 30-39 MIN: ICD-10-PCS | Mod: 25,S$PBB,, | Performed by: NURSE PRACTITIONER

## 2023-09-13 PROCEDURE — 1159F PR MEDICATION LIST DOCUMENTED IN MEDICAL RECORD: ICD-10-PCS | Mod: CPTII,,, | Performed by: NURSE PRACTITIONER

## 2023-09-13 RX ORDER — HYDROXYZINE PAMOATE 25 MG/1
25 CAPSULE ORAL NIGHTLY PRN
Qty: 30 CAPSULE | Refills: 4 | Status: SHIPPED | OUTPATIENT
Start: 2023-09-13 | End: 2024-01-25 | Stop reason: SDUPTHER

## 2023-09-13 RX ORDER — VENLAFAXINE HYDROCHLORIDE 75 MG/1
75 CAPSULE, EXTENDED RELEASE ORAL DAILY
Qty: 90 CAPSULE | Refills: 1 | Status: SHIPPED | OUTPATIENT
Start: 2023-09-13 | End: 2024-01-25 | Stop reason: SDUPTHER

## 2023-09-13 RX ORDER — VENLAFAXINE HYDROCHLORIDE 37.5 MG/1
37.5 CAPSULE, EXTENDED RELEASE ORAL DAILY
Qty: 90 CAPSULE | Refills: 1 | Status: SHIPPED | OUTPATIENT
Start: 2023-09-13 | End: 2024-01-25 | Stop reason: SDUPTHER

## 2023-09-13 RX ORDER — TRAMADOL HYDROCHLORIDE 50 MG/1
50 TABLET ORAL
COMMUNITY
Start: 2023-08-19

## 2023-09-13 RX ORDER — ATORVASTATIN CALCIUM 20 MG/1
20 TABLET, FILM COATED ORAL NIGHTLY
Qty: 90 TABLET | Refills: 1 | Status: SHIPPED | OUTPATIENT
Start: 2023-09-13 | End: 2024-01-25 | Stop reason: SDUPTHER

## 2023-09-13 NOTE — PROGRESS NOTES
[facility] Medicine Clinic  TRACEY Brown     Patient Name: Twila Felix   : 1960  MRN:[id number]     Chief Complaint     Chief Complaint   Patient presents with    Follow-up     Lab review        History of Present Illness     63 year old white female, presents in clinic for lab f/u, no acute concerns today voiced.    PMH HLD, HTN, PVD, DIANA on CPAP, COPD, tobacco user 1/2ppd, depression, chronic right knee pain. Mood stable.Taking Effexor 75mg and 37.5mg daily.Was tried on hctz and losartan in the past but stopped due to it dropping her too low. Lisinopril caused angioedema. Enrolled in smoking cessation classes/medications. Following Dr. Hannah for peripheral vascular reflux, left leg venous procedure on 2023. Pt is using CPAP nightly, benefiting from use and would like to continue use of CPAP. Sleep study 3 yrs prior.     Denies chest pain, shortness of breath, cough, fever, headache, dizziness, weakness, abdominal pain, nausea, vomiting, diarrhea, constipation, dysuria, SI, HI, anxiety.     MMG 2023; BIRADS 1     Following [facility] GYN  Cologuard 10/2022 positive, colonoscopy at [facility] on 2023; adenoma polyps, repeat in 5 yrs ().  LDCT 2023: Lung-RADS Category:  2 - Benign Appearance or Behavior - continue annual screening with LDCT in 12 months.There are changes seen consistent with COPD in the lungs bilaterally  PFT 2023:  moderate restriction, no significant bronchodilator response.          Review of Systems     Review of Systems   Constitutional: Negative.    HENT: Negative.     Eyes: Negative.    Respiratory: Negative.     Cardiovascular: Negative.    Gastrointestinal: Negative.    Endocrine: Negative.    Genitourinary: Negative.    Musculoskeletal: Negative.    Integumentary:  Negative.   Allergic/Immunologic: Negative.    Neurological: Negative.    Hematological: Negative.    Psychiatric/Behavioral: Negative.     All other systems reviewed and are negative.    "    Physical Examination     Visit Vitals  /84 (BP Location: Left arm, Patient Position: Sitting, BP Method: Medium (Manual))   Pulse 60   Temp 97.7 °F (36.5 °C) (Oral)   Resp 16   Ht 4' 11" (1.499 m)   Wt 83.5 kg (184 lb)   BMI 37.16 kg/m²        BP Readings from Last 6 Encounters:   09/13/23 138/84   07/31/23 125/73   07/13/23 119/81   06/13/23 138/82   05/11/23 138/76   01/11/23 136/84   ]    Wt Readings from Last 6 Encounters:   09/13/23 83.5 kg (184 lb)   07/31/23 81.1 kg (178 lb 12.8 oz)   07/13/23 82.1 kg (180 lb 14.4 oz)   06/13/23 83 kg (183 lb)   05/11/23 82.3 kg (181 lb 6.4 oz)   01/11/23 79.4 kg (175 lb)   ]      Physical Exam  Vitals and nursing note reviewed.   Constitutional:       Appearance: Normal appearance.   HENT:      Head: Normocephalic and atraumatic.      Right Ear: Tympanic membrane, ear canal and external ear normal.      Left Ear: Tympanic membrane, ear canal and external ear normal.      Nose: Nose normal.      Mouth/Throat:      Mouth: Mucous membranes are moist.      Pharynx: Oropharynx is clear.   Eyes:      Extraocular Movements: Extraocular movements intact.      Conjunctiva/sclera: Conjunctivae normal.      Pupils: Pupils are equal, round, and reactive to light.   Cardiovascular:      Rate and Rhythm: Normal rate and regular rhythm.      Pulses: Normal pulses.      Heart sounds: Normal heart sounds.   Pulmonary:      Effort: Pulmonary effort is normal.      Breath sounds: Normal breath sounds.   Abdominal:      General: Abdomen is flat. Bowel sounds are normal.      Palpations: Abdomen is soft.   Musculoskeletal:         General: Normal range of motion.      Cervical back: Normal range of motion and neck supple.   Skin:     General: Skin is warm and dry.      Capillary Refill: Capillary refill takes less than 2 seconds.   Neurological:      General: No focal deficit present.      Mental Status: She is alert and oriented to person, place, and time. Mental status is at " baseline.   Psychiatric:         Mood and Affect: Mood normal.         Behavior: Behavior normal.         Thought Content: Thought content normal.         Judgment: Judgment normal.          Labs / Imaging     Chemistry:  Lab Results   Component Value Date     (H) 09/12/2023    K 4.8 09/12/2023    CHLORIDE 108 (H) 09/12/2023    BUN 18.1 09/12/2023    CREATININE 0.66 09/12/2023    EGFRNORACEVR >60 09/12/2023    GLUCOSE 89 09/12/2023    CALCIUM 9.5 09/12/2023    ALKPHOS 99 09/12/2023    LABPROT 6.9 09/12/2023    ALBUMIN 3.6 09/12/2023    BILIDIR 0.2 03/10/2022    IBILI 0.20 03/10/2022    AST 28 09/12/2023    ALT 29 09/12/2023    SAOFOQCK89IR 45.0 09/12/2023        Lab Results   Component Value Date    HGBA1C 5.6 09/12/2023        Hematology:  Lab Results   Component Value Date    WBC 6.75 09/12/2023    RBC 4.23 09/12/2023    HGB 13.3 09/12/2023    HCT 41.9 09/12/2023    MCV 99.1 (H) 09/12/2023    MCH 31.4 (H) 09/12/2023    MCHC 31.7 (L) 09/12/2023    RDW 13.1 09/12/2023     09/12/2023    MPV 12.5 (H) 09/12/2023        Lipid Panel:  Lab Results   Component Value Date    CHOL 146 09/12/2023    HDL 40 09/12/2023    LDL 91.00 09/12/2023    TRIG 74 09/12/2023    TOTALCHOLEST 4 09/12/2023        Urine:  Lab Results   Component Value Date    COLORUA Yellow 07/13/2023    APPEARANCEUA Clear 07/13/2023    SGUA 1.022 07/13/2023    PHUA 7.5 07/13/2023    PROTEINUA Negative 07/13/2023    GLUCOSEUA Normal 07/13/2023    KETONESUA Negative 07/13/2023    BLOODUA Negative 07/13/2023    NITRITESUA Negative 07/13/2023    LEUKOCYTESUR Negative 07/13/2023    RBCUA 0-5 07/13/2023    WBCUA None Seen 07/13/2023    BACTERIA None Seen 07/13/2023    SQEPUA Occ (A) 07/13/2023    HYALINECASTS None Seen 07/13/2023          Assessment       ICD-10-CM ICD-9-CM   1. Hyperlipidemia, unspecified hyperlipidemia type  E78.5 272.4   2. Chronic obstructive pulmonary disease, unspecified COPD type  J44.9 496   3. Cigarette nicotine dependence  without complication [F17.210]  F17.210 305.1   4. Obesity, unspecified classification, unspecified obesity type, unspecified whether serious comorbidity present  E66.9 278.00   5. Vitamin D deficiency  E55.9 268.9   6. Need for influenza vaccination  Z23 V04.81        Plan     1. Hyperlipidemia, unspecified hyperlipidemia type  Lab Results   Component Value Date    LDL 91.00 09/12/2023    CHOL 146 09/12/2023    HDL 40 09/12/2023    TRIG 74 09/12/2023     Improved.  Cont RX daily; refills given. Take Omega 3 daily.   Stressed importance of dietary modifications. Follow a low cholesterol, low saturated fat diet with less that 200mg of cholesterol a day.  Avoid fried foods and high saturated fats (high saturated fats less than 7% of calories).  Add Flax Seed/Fish Oil supplements to diet. Increase dietary fiber.  Regular exercise can reduce LDL and raise HDL. Stressed importance of physical activity 5 times per week for 30 minutes per day.    - CBC Auto Differential; Future  - Comprehensive Metabolic Panel; Future  - Lipid Panel; Future  - Hemoglobin A1C; Future    2. Chronic obstructive pulmonary disease, unspecified COPD type  PFT reviewed  Working closely through smoking cessation program to quit smoking     Use inhalers as prescribed (rinse mouth after use of steroid inhalers).    Use long term inhalers daily and rescue inhaler as needed.    Avoid triggers (high humidity, strong odors, chemical fumes).    Report signs of upper respiratory infection as soon as possible for early treatment.    Practice/encouraged abdominal breathing.   Eat smaller, more frequent meals.   Flu shot recommended yearly.    Smoking cessation encouraged     3. Cigarette nicotine dependence without complication [F17.210]  Smoking cessation advised. Instructed on smoking cessation program through [facility] and pharmacological interventions to aid in cessation.  >5 minutes allotted to educate patient on the harms of smoking, the urgency to quit,  and the development of a plan for smoking cessation.     4. Obesity, unspecified classification, unspecified obesity type, unspecified whether serious comorbidity present  Goal BMI <30.  Exercise 5 times a week for 30 minutes per day.  Avoid soda, simple sugars, excessive rice, potatoes or bread. Limit fast foods and fried foods.  Choose complex carbs in moderation (example: green vegetables, beans, oatmeal). Eat plenty of fresh fruits and vegetables with lean meats daily.  Do not skip meals. Eat a balanced portion size.  Avoid fad diets. Consider permanent healthy life style changes.       5. Vitamin D deficiency  Vitamin D level reviewed and is currently at goal, between 30-80 ng/mL. Continue OTC Vitamin D3 2000 IU daily.   - Vitamin D; Future    6. Need for influenza vaccination  Admin today  - Influenza - Quadrivalent *Preferred* (6 months+) (PF)        Current Outpatient Medications   Medication Instructions    atorvastatin (LIPITOR) 20 mg, Oral, Nightly    cetirizine (ZYRTEC) 10 mg, Oral    fluticasone propionate (FLONASE) 50 mcg/actuation nasal spray SMARTSI-2 Spray(s) Both Nares Daily    hydrOXYzine pamoate (VISTARIL) 25 mg, Oral, Nightly PRN    ibuprofen (ADVIL,MOTRIN) 800 mg, Oral    mv-mn/iron/folic acid/herb 190 (VITAMIN D3 COMPLETE ORAL)   Daily, 0 Refill(s)    nicotine (NICODERM CQ) 21 mg/24 hr 1 patch, Transdermal, Daily    nicotine polacrilex 2 mg, Oral, As needed (PRN)    traMADoL (ULTRAM) 50 mg, Oral    venlafaxine (EFFEXOR-XR) 75 mg, Oral, Daily    venlafaxine (EFFEXOR-XR) 37.5 mg, Oral, Daily       Orders Placed This Encounter   Procedures    Influenza - Quadrivalent *Preferred* (6 months+) (PF)    CBC Auto Differential    Comprehensive Metabolic Panel    Lipid Panel    Hemoglobin A1C    Vitamin D         Future Appointments   Date Time Provider Department Center   2023  3:00 PM Caridad Cabrera, ROC[facility]O LG[facility] [place]AH[place]   2024  8:15 AM [facility] DIDIERO2 [facility] SUZANNA [place]    7/18/2024  8:10 AM Bryanna Torres, TRACEY [facility] [place]        Follow up in about 4 months (around 1/13/2024) for lab review.    Labs thoroughly reviewed with patient. Medication refills addressed today.  RTC prn and 4 months, with labs 1 week prior to the apt.  COVID 19 precautions given to patient.  Patient voices understanding of all discharge instructions.      TRACEY Brown

## 2023-09-25 ENCOUNTER — CLINICAL SUPPORT (OUTPATIENT)
Dept: SMOKING CESSATION | Facility: CLINIC | Age: 63
End: 2023-09-25
Payer: COMMERCIAL

## 2023-09-25 DIAGNOSIS — F17.200 NICOTINE DEPENDENCE: Primary | ICD-10-CM

## 2023-09-25 PROCEDURE — 99404 PREV MED CNSL INDIV APPRX 60: CPT | Mod: S$GLB,,,

## 2023-09-25 PROCEDURE — 99404 PR PREVENT COUNSEL,INDIV,60 MIN: ICD-10-PCS | Mod: S$GLB,,,

## 2023-09-25 RX ORDER — IBUPROFEN 200 MG
1 TABLET ORAL DAILY
Qty: 28 PATCH | Refills: 0 | Status: SHIPPED | OUTPATIENT
Start: 2023-09-25 | End: 2024-01-25

## 2023-09-25 NOTE — PROGRESS NOTES
Individual Follow-Up Form    9/25/2023    Quit Date:     Clinical Status of Patient: Outpatient    Continuing Medication: yes  Patches or Nicotine Lozenges     Target Symptoms: Withdrawal and medication side effects. The following were  rated moderate (3) to severe (4) on TCRS:  Moderate (3): none  Severe (4): none    Comments: Patient presents for follow up smoking 10 cigarettes per day. Pt remains on tobacco cessation medication of 21 mg nicotine patch QD and 2 mg nicotine lozenge PRN (1-2 per hour in place of cigarettes). No adverse effects noted at this time. Pt doing well with rate reduction and wait times prior to smoking. Pt encouraged to pick a quit day. Pt stated that she is taking it slow and does not want to choose a quit day now.  Reviewed strategies, cues, and triggers. Introduced the negative impact of tobacco on health, the health advantages of discontinuing the use of tobacco, time line improved health changes after a quit, withdrawal issues to expect from nicotine and habit, and ways to achieve the goal of a quit. Pt will continue to work on rate reduction.  Commended pt on her progress thus far.    Diagnosis: F17.200    Next Visit: 2 weeks

## 2023-10-04 ENCOUNTER — HOSPITAL ENCOUNTER (EMERGENCY)
Facility: HOSPITAL | Age: 63
Discharge: HOME | End: 2023-10-04
Attending: EMERGENCY MEDICINE
Payer: MEDICAID

## 2023-10-04 VITALS
TEMPERATURE: 98 F | OXYGEN SATURATION: 96 % | WEIGHT: 180 LBS | RESPIRATION RATE: 17 BRPM | SYSTOLIC BLOOD PRESSURE: 158 MMHG | BODY MASS INDEX: 36.36 KG/M2 | HEART RATE: 70 BPM | DIASTOLIC BLOOD PRESSURE: 99 MMHG

## 2023-10-04 DIAGNOSIS — K64.9 BLEEDING HEMORRHOID: ICD-10-CM

## 2023-10-04 DIAGNOSIS — R10.84 GENERALIZED ABDOMINAL PAIN: Primary | ICD-10-CM

## 2023-10-04 LAB
ALBUMIN SERPL-MCNC: 3.9 G/DL (ref 3.4–4.8)
ALBUMIN/GLOB SERPL: 1.2 RATIO (ref 1.1–2)
ALP SERPL-CCNC: 109 UNIT/L (ref 40–150)
ALT SERPL-CCNC: 38 UNIT/L (ref 0–55)
APPEARANCE UR: CLEAR
AST SERPL-CCNC: 30 UNIT/L (ref 5–34)
BACTERIA #/AREA URNS AUTO: NORMAL /HPF
BASOPHILS # BLD AUTO: 0.02 X10(3)/MCL
BASOPHILS NFR BLD AUTO: 0.3 %
BILIRUB SERPL-MCNC: 0.5 MG/DL
BILIRUB UR QL STRIP.AUTO: NEGATIVE
BUN SERPL-MCNC: 21.3 MG/DL (ref 9.8–20.1)
CALCIUM SERPL-MCNC: 9.6 MG/DL (ref 8.4–10.2)
CHLORIDE SERPL-SCNC: 107 MMOL/L (ref 98–107)
CO2 SERPL-SCNC: 28 MMOL/L (ref 23–31)
COLOR UR AUTO: NORMAL
CREAT SERPL-MCNC: 0.6 MG/DL (ref 0.55–1.02)
EOSINOPHIL # BLD AUTO: 0.19 X10(3)/MCL (ref 0–0.9)
EOSINOPHIL NFR BLD AUTO: 2.4 %
ERYTHROCYTE [DISTWIDTH] IN BLOOD BY AUTOMATED COUNT: 13.2 % (ref 11.5–17)
GFR SERPLBLD CREATININE-BSD FMLA CKD-EPI: >60 MLS/MIN/1.73/M2
GLOBULIN SER-MCNC: 3.2 GM/DL (ref 2.4–3.5)
GLUCOSE SERPL-MCNC: 98 MG/DL (ref 82–115)
GLUCOSE UR QL STRIP.AUTO: NEGATIVE
HCT VFR BLD AUTO: 44.1 % (ref 37–47)
HGB BLD-MCNC: 14.6 G/DL (ref 12–16)
IMM GRANULOCYTES # BLD AUTO: 0.02 X10(3)/MCL (ref 0–0.04)
IMM GRANULOCYTES NFR BLD AUTO: 0.3 %
INR PPP: 0.9
KETONES UR QL STRIP.AUTO: NEGATIVE
LEUKOCYTE ESTERASE UR QL STRIP.AUTO: NEGATIVE
LIPASE SERPL-CCNC: 28 U/L
LYMPHOCYTES # BLD AUTO: 1.5 X10(3)/MCL (ref 0.6–4.6)
LYMPHOCYTES NFR BLD AUTO: 18.9 %
MAGNESIUM SERPL-MCNC: 1.7 MG/DL (ref 1.6–2.6)
MCH RBC QN AUTO: 32.4 PG (ref 27–31)
MCHC RBC AUTO-ENTMCNC: 33.1 G/DL (ref 33–36)
MCV RBC AUTO: 97.8 FL (ref 80–94)
MONOCYTES # BLD AUTO: 0.69 X10(3)/MCL (ref 0.1–1.3)
MONOCYTES NFR BLD AUTO: 8.7 %
NEUTROPHILS # BLD AUTO: 5.52 X10(3)/MCL (ref 2.1–9.2)
NEUTROPHILS NFR BLD AUTO: 69.4 %
NITRITE UR QL STRIP.AUTO: NEGATIVE
NRBC BLD AUTO-RTO: 0 %
PH UR STRIP.AUTO: 5.5 [PH]
PLATELET # BLD AUTO: 155 X10(3)/MCL (ref 130–400)
PMV BLD AUTO: 12.9 FL (ref 7.4–10.4)
POTASSIUM SERPL-SCNC: 4.9 MMOL/L (ref 3.5–5.1)
PROT SERPL-MCNC: 7.1 GM/DL (ref 5.8–7.6)
PROT UR QL STRIP.AUTO: NEGATIVE
PROTHROMBIN TIME: 12.3 SECONDS (ref 12.5–14.5)
RBC # BLD AUTO: 4.51 X10(6)/MCL (ref 4.2–5.4)
RBC #/AREA URNS AUTO: NORMAL /HPF
RBC UR QL AUTO: NEGATIVE
SODIUM SERPL-SCNC: 142 MMOL/L (ref 136–145)
SP GR UR STRIP.AUTO: 1.02 (ref 1–1.03)
SQUAMOUS #/AREA URNS AUTO: NORMAL /HPF
UROBILINOGEN UR STRIP-ACNC: 0.2
WBC # SPEC AUTO: 7.94 X10(3)/MCL (ref 4.5–11.5)
WBC #/AREA URNS AUTO: NORMAL /HPF

## 2023-10-04 PROCEDURE — 83735 ASSAY OF MAGNESIUM: CPT | Performed by: NURSE PRACTITIONER

## 2023-10-04 PROCEDURE — 83690 ASSAY OF LIPASE: CPT | Performed by: NURSE PRACTITIONER

## 2023-10-04 PROCEDURE — 99285 EMERGENCY DEPT VISIT HI MDM: CPT | Mod: 25

## 2023-10-04 PROCEDURE — 80053 COMPREHEN METABOLIC PANEL: CPT | Performed by: NURSE PRACTITIONER

## 2023-10-04 PROCEDURE — 85025 COMPLETE CBC W/AUTO DIFF WBC: CPT | Performed by: NURSE PRACTITIONER

## 2023-10-04 PROCEDURE — 85610 PROTHROMBIN TIME: CPT | Performed by: NURSE PRACTITIONER

## 2023-10-04 PROCEDURE — 81001 URINALYSIS AUTO W/SCOPE: CPT | Performed by: NURSE PRACTITIONER

## 2023-10-04 PROCEDURE — 25500020 PHARM REV CODE 255: Performed by: NURSE PRACTITIONER

## 2023-10-04 RX ADMIN — IOPAMIDOL 100 ML: 755 INJECTION, SOLUTION INTRAVENOUS at 01:10

## 2023-10-04 NOTE — ED NOTES
.Discharge instruction and education were given to pt. Discussed medications, follow up care, and return precautions. Pt verbalizes understanding. Pt ambulated to exit out.

## 2023-10-04 NOTE — FIRST PROVIDER EVALUATION
Medical screening examination initiated.  I have conducted a focused provider triage encounter, findings are as follows:    Brief history of present illness:  62 y/o female who presents with c/o upper abdominal sharp pain and bilateral lower abdominal cramping. She had 4-5 episodes of stool yesterday. Then she had episode of gas which when she looked was blood (bright red). States still having blood when she wipes but no BM today.     Vitals:    10/04/23 1047   BP: (!) 165/100   BP Location: Left arm   Patient Position: Sitting   Pulse: 79   Resp: 14   Temp: 97.8 °F (36.6 °C)   TempSrc: Oral   SpO2: 98%   Weight: 81.6 kg (180 lb)       Pertinent physical exam:  alert, nonlabord, ambulatory     Brief workup plan:  labs, urine    Preliminary workup initiated; this workup will be continued and followed by the physician or advanced practice provider that is assigned to the patient when roomed.

## 2023-10-04 NOTE — ED PROVIDER NOTES
Encounter Date: 10/4/2023       History     Chief Complaint   Patient presents with    Abdominal Pain     RUQ pain x 1 week and lower abdominal pain. Reports 5 episodes of diarrhea yesterday and resolved now. Bright red blood when wiping that started today.  +nausea     63-year-old white female with a history of colon polyps recently removed at the end of July , anxiety, dyslipidemia and hypertension with prior appendectomy who presents to this emergency department complaining of a several day history of abdominal pain.  She says that sometimes it is sharp pains in the right upper quadrant and other times it is diffuse lower abdominal cramping associated with  diarrhea.  But now having bright red blood per rectum spontaneously since today.  No further bowel movements today.  No fevers or chills.  No chest pain or shortness of breath.      Review of patient's allergies indicates:   Allergen Reactions    Lisinopril      Other reaction(s): angioedema     Past Medical History:   Diagnosis Date    Anxiety disorder, unspecified     Depression     Hyperlipidemia     Hypertension      Past Surgical History:   Procedure Laterality Date    ABDOMINAL HERNIA REPAIR      APPENDECTOMY       SECTION       SECTION      COLONOSCOPY, WITH POLYPECTOMY USING HOT BIOPSY FORCEPS  2023    Procedure: COLONOSCOPY, WITH POLYPECTOMY USING cold BIOPSY FORCEPS;  Surgeon: Amanuel Herrera MD;  Location: [facility] ENDOSCOPY;  Service: Endoscopy;;    COLONOSCOPY, WITH POLYPECTOMY USING SNARE N/A 2023    Procedure: COLONOSCOPY, WITH POLYPECTOMY USING SNARE;  Surgeon: Amanuel Herrera MD;  Location: [facility] ENDOSCOPY;  Service: Endoscopy;  Laterality: N/A;  descending colon polyp not retrived    TUBAL LIGATION       Family History   Problem Relation Age of Onset    Hypertension Mother     Heart disease Mother     Hypertension Father      Social History     Tobacco Use    Smoking status: Every Day      Current packs/day: 1.00     Average packs/day: 0.9 packs/day for 50.8 years (46.3 ttl pk-yrs)     Types: Cigarettes     Start date: 1973    Smokeless tobacco: Never   Substance Use Topics    Alcohol use: Not Currently    Drug use: Never     Review of Systems   Constitutional: Negative.  Negative for fever.   HENT: Negative.  Negative for sore throat.    Respiratory: Negative.  Negative for shortness of breath.    Cardiovascular: Negative.  Negative for chest pain.   Gastrointestinal:  Positive for diarrhea. Negative for nausea. Blood in stool: bright red blood per rectum.  Genitourinary: Negative.  Negative for dysuria.   Musculoskeletal: Negative.  Negative for back pain.   Skin:  Negative for rash.   Neurological: Negative.  Negative for weakness.   Hematological:  Does not bruise/bleed easily.       Physical Exam     Initial Vitals [10/04/23 1047]   BP Pulse Resp Temp SpO2   (!) 165/100 79 14 97.8 °F (36.6 °C) 98 %      MAP       --         Physical Exam    Constitutional: She appears well-developed and well-nourished. She is not diaphoretic. No distress.   Overweight, smells of cigarette smoke   HENT:   Head: Normocephalic and atraumatic.   Eyes: EOM are normal. Pupils are equal, round, and reactive to light.   Neck: Neck supple.   Pulmonary/Chest: Breath sounds normal.   Abdominal: Abdomen is soft. She exhibits no distension and no mass. There is no abdominal tenderness.   Rectal exam with nurse witnessing: There is what appears to be a recently ruptured external hemorrhoid at 3:00 a.m..  It is tender  but no active bleeding but does have dried blood around it.  I do not do a guaiac test as it would automatically become positive with  blood in this area There is no rebound and no guarding.   Musculoskeletal:      Cervical back: Neck supple.     Neurological: She is alert. She has normal strength. No cranial nerve deficit or sensory deficit.         ED Course   Procedures  Labs Reviewed   CBC WITH  DIFFERENTIAL - Abnormal; Notable for the following components:       Result Value    MCV 97.8 (*)     MCH 32.4 (*)     MPV 12.9 (*)     All other components within normal limits   COMPREHENSIVE METABOLIC PANEL - Abnormal; Notable for the following components:    Blood Urea Nitrogen 21.3 (*)     All other components within normal limits   PROTIME-INR - Abnormal; Notable for the following components:    PT 12.3 (*)     All other components within normal limits   MAGNESIUM - Normal   LIPASE - Normal   URINALYSIS, REFLEX TO URINE CULTURE - Normal   URINALYSIS, MICROSCOPIC - Normal          Imaging Results              CT Abdomen Pelvis With Contrast (Final result)  Result time 10/04/23 13:46:03      Final result by Sagrario Tripathi MD (10/04/23 13:46:03)                   Impression:      No acute abnormality of the abdomen or pelvis.      Electronically signed by: Sagrario Tripathi  Date:    10/04/2023  Time:    13:46               Narrative:    EXAMINATION:  CT ABDOMEN PELVIS WITH CONTRAST    CLINICAL HISTORY:  Abdominal pain, acute, nonlocalized;    TECHNIQUE:  CT imaging was performed of the abdomen and pelvis after the administration of intravenous contrast. Dose length product is 615 mGycm. Automatic exposure control, adjustment of mA/kV or iterative reconstruction technique was used to limit radiation dose.    COMPARISON:  None    FINDINGS:  Liver: Normal.    Gallbladder and biliary tree: No calcified gallstones. No intra or extrahepatic biliary ductal dilation.    Pancreas: Normal.    Spleen: Normal.    Adrenals: Normal.    Kidneys and ureters: No hydronephrosis.    Bladder: Normal.    Reproductive organs: No pelvic masses.    Stomach/bowel: No evidence of bowel obstruction.  No discernible bowel inflammation.    Lymph nodes: No pathologically enlarged lymph node identified.    Peritoneum: No ascites or free air. No fluid collection.    Vessels: Moderate scattered vascular calcifications.    Abdominal wall:  "Small fat containing periumbilical hernia.    Lung bases: No consolidation or pleural effusion.    Bones: No acute osseous findings.                                       Medications   iopamidoL (ISOVUE-370) injection 100 mL (100 mLs Intravenous Given 10/4/23 1337)     Medical Decision Making             ED Course as of 10/04/23 1504   Wed Oct 04, 2023   1503 Patient states she feels better actually and is ready to go home.  She did not have any diarrhea here.  She states that her cramping now just "comes and goes".  I had ordered a right upper quadrant ultrasound but for some reason it is not going through or getting done but patient says she can do that outpatient if she needs to.  She will contact her primary or her GI doctor.  She says it did feel like she just had a virus but it was the bleeding that caused her concern and that seems to be coming from the recently ruptured external hemorrhoid. [RD]      ED Course User Index  [RD] Chikis Leggett MD                    Clinical Impression:   Final diagnoses:  [R10.84] Generalized abdominal pain (Primary)  [K64.9] Bleeding hemorrhoid               Chikis Leggett MD  10/04/23 1505    "

## 2023-10-25 ENCOUNTER — CLINICAL SUPPORT (OUTPATIENT)
Dept: SMOKING CESSATION | Facility: CLINIC | Age: 63
End: 2023-10-25
Payer: COMMERCIAL

## 2023-10-25 DIAGNOSIS — F17.200 NICOTINE DEPENDENCE: Primary | ICD-10-CM

## 2023-10-25 PROCEDURE — 99402 PR PREVENT COUNSEL,INDIV,30 MIN: ICD-10-PCS | Mod: S$PBB,,,

## 2023-10-25 PROCEDURE — 99402 PREV MED CNSL INDIV APPRX 30: CPT | Mod: S$PBB,,,

## 2023-10-25 RX ORDER — MICONAZOLE NITRATE 2 %
2 CREAM (GRAM) TOPICAL
Qty: 50 EACH | Refills: 0 | Status: SHIPPED | OUTPATIENT
Start: 2023-10-25 | End: 2024-01-25

## 2023-10-25 NOTE — PROGRESS NOTES
Individual Follow-Up Form    10/25/2023    Quit Date:     Clinical Status of Patient: Outpatient    Continuing Medication: yes  Patches    Other Medications: Nicotine gum     Target Symptoms: Withdrawal and medication side effects. The following were  rated moderate (3) to severe (4) on TCRS:  Moderate (3): none  Severe (4): none    Comments: Spoke with pt via phone regarding smoking cessation progress.  Pt is smoking 10 cigarettes per day.  Pt remains on tobacco cessation regimen of 21 mg nicotine patch QD.  No adverse effects noted at this time.  Pt stated that she is not using the lozenges because she does not like the taste of them.  Discussed trying the nicotine gum.  Discussed usage and placement.  Also discussed trying Wellbutrin or Chantix.  Pt stated that she does not want to any more pills.  Pt stated that she feels like she is stuck at 10 cigarettes per day.  Pt is smoking more in the morning possibly due to stress.  Discussed ways to manage her stress rather than smoking.  Also encouraged pt to change her routine in the morning.  Reviewed strategies, controlling environment, cues, triggers, new goals set. Introduced high risk situations with preparation interventions, caffeine similarities with withdrawal issues of habit and nicotine, Alcohol, Understanding urges, cravings, stress and relaxation. Open discussion with intervention discussion. Encouraged pt to move her cigarettes again and continue to work on rate reduction.     Diagnosis: F17.200    Next Visit: 2 weeks

## 2023-11-14 ENCOUNTER — TELEPHONE (OUTPATIENT)
Dept: SMOKING CESSATION | Facility: CLINIC | Age: 63
End: 2023-11-14
Payer: MEDICAID

## 2023-12-05 ENCOUNTER — CLINICAL SUPPORT (OUTPATIENT)
Dept: SMOKING CESSATION | Facility: CLINIC | Age: 63
End: 2023-12-05
Payer: COMMERCIAL

## 2023-12-05 DIAGNOSIS — F17.200 NICOTINE DEPENDENCE: Primary | ICD-10-CM

## 2023-12-05 PROCEDURE — 99401 PREV MED CNSL INDIV APPRX 15: CPT | Mod: S$GLB,,,

## 2023-12-05 PROCEDURE — 99401 PR PREVENT COUNSEL,INDIV,15 MIN: ICD-10-PCS | Mod: S$GLB,,,

## 2023-12-05 NOTE — PROGRESS NOTES
Individual Follow-Up Form    12/5/2023    Quit Date:     Clinical Status of Patient: Outpatient    Continuing Medication: no     Target Symptoms: Withdrawal and medication side effects. The following were  rated moderate (3) to severe (4) on TCRS:  Moderate (3): none  Severe (4): none    Comments: Pt had not shown up for her follow up smoking cessation appointment.  Called and spoke with pt via phone regarding smoking cessation progress.  Pt stated that she has not been doing well with trying to quit.  Pt stated that her neighbor passed away last week and this is just not a good time for her to quit smoking.  Pt is no longer using the 21 mg nicotine patch nor the 2 mg nicotine lozenges.  Encouraged pt to continue to make small changes like moving her cigarettes and waiting 15 minutes prior to smoking to help her cut down.  Pt stated that she will call back when she is ready to get back into the program.    Diagnosis: F17.200    Next Visit: 2 weeks

## 2024-01-12 ENCOUNTER — LAB VISIT (OUTPATIENT)
Dept: LAB | Facility: HOSPITAL | Age: 64
End: 2024-01-12
Attending: NURSE PRACTITIONER
Payer: MEDICAID

## 2024-01-12 DIAGNOSIS — E55.9 VITAMIN D DEFICIENCY: ICD-10-CM

## 2024-01-12 DIAGNOSIS — E78.5 HYPERLIPIDEMIA, UNSPECIFIED HYPERLIPIDEMIA TYPE: ICD-10-CM

## 2024-01-12 LAB
ALBUMIN SERPL-MCNC: 3.7 G/DL (ref 3.4–4.8)
ALBUMIN/GLOB SERPL: 1.1 RATIO (ref 1.1–2)
ALP SERPL-CCNC: 104 UNIT/L (ref 40–150)
ALT SERPL-CCNC: 22 UNIT/L (ref 0–55)
AST SERPL-CCNC: 22 UNIT/L (ref 5–34)
BASOPHILS # BLD AUTO: 0.01 X10(3)/MCL
BASOPHILS NFR BLD AUTO: 0.2 %
BILIRUB SERPL-MCNC: 0.4 MG/DL
BUN SERPL-MCNC: 18.5 MG/DL (ref 9.8–20.1)
CALCIUM SERPL-MCNC: 9.2 MG/DL (ref 8.4–10.2)
CHLORIDE SERPL-SCNC: 108 MMOL/L (ref 98–107)
CHOLEST SERPL-MCNC: 164 MG/DL
CHOLEST/HDLC SERPL: 4 {RATIO} (ref 0–5)
CO2 SERPL-SCNC: 28 MMOL/L (ref 23–31)
CREAT SERPL-MCNC: 0.63 MG/DL (ref 0.55–1.02)
DEPRECATED CALCIDIOL+CALCIFEROL SERPL-MC: 44.9 NG/ML (ref 30–80)
EOSINOPHIL # BLD AUTO: 0.2 X10(3)/MCL (ref 0–0.9)
EOSINOPHIL NFR BLD AUTO: 3 %
ERYTHROCYTE [DISTWIDTH] IN BLOOD BY AUTOMATED COUNT: 13 % (ref 11.5–17)
EST. AVERAGE GLUCOSE BLD GHB EST-MCNC: 108.3 MG/DL
GFR SERPLBLD CREATININE-BSD FMLA CKD-EPI: >60 MLS/MIN/1.73/M2
GLOBULIN SER-MCNC: 3.5 GM/DL (ref 2.4–3.5)
GLUCOSE SERPL-MCNC: 96 MG/DL (ref 82–115)
HBA1C MFR BLD: 5.4 %
HCT VFR BLD AUTO: 44.1 % (ref 37–47)
HDLC SERPL-MCNC: 40 MG/DL (ref 35–60)
HGB BLD-MCNC: 14 G/DL (ref 12–16)
IMM GRANULOCYTES # BLD AUTO: 0.02 X10(3)/MCL (ref 0–0.04)
IMM GRANULOCYTES NFR BLD AUTO: 0.3 %
LDLC SERPL CALC-MCNC: 106 MG/DL (ref 50–140)
LYMPHOCYTES # BLD AUTO: 1.3 X10(3)/MCL (ref 0.6–4.6)
LYMPHOCYTES NFR BLD AUTO: 19.7 %
MCH RBC QN AUTO: 31.3 PG (ref 27–31)
MCHC RBC AUTO-ENTMCNC: 31.7 G/DL (ref 33–36)
MCV RBC AUTO: 98.4 FL (ref 80–94)
MONOCYTES # BLD AUTO: 0.61 X10(3)/MCL (ref 0.1–1.3)
MONOCYTES NFR BLD AUTO: 9.2 %
NEUTROPHILS # BLD AUTO: 4.46 X10(3)/MCL (ref 2.1–9.2)
NEUTROPHILS NFR BLD AUTO: 67.6 %
NRBC BLD AUTO-RTO: 0 %
PLATELET # BLD AUTO: 169 X10(3)/MCL (ref 130–400)
PMV BLD AUTO: 12.8 FL (ref 7.4–10.4)
POTASSIUM SERPL-SCNC: 4.3 MMOL/L (ref 3.5–5.1)
PROT SERPL-MCNC: 7.2 GM/DL (ref 5.8–7.6)
RBC # BLD AUTO: 4.48 X10(6)/MCL (ref 4.2–5.4)
SODIUM SERPL-SCNC: 143 MMOL/L (ref 136–145)
TRIGL SERPL-MCNC: 90 MG/DL (ref 37–140)
VLDLC SERPL CALC-MCNC: 18 MG/DL
WBC # SPEC AUTO: 6.6 X10(3)/MCL (ref 4.5–11.5)

## 2024-01-12 PROCEDURE — 85025 COMPLETE CBC W/AUTO DIFF WBC: CPT

## 2024-01-12 PROCEDURE — 80053 COMPREHEN METABOLIC PANEL: CPT

## 2024-01-12 PROCEDURE — 83036 HEMOGLOBIN GLYCOSYLATED A1C: CPT

## 2024-01-12 PROCEDURE — 82306 VITAMIN D 25 HYDROXY: CPT

## 2024-01-12 PROCEDURE — 36415 COLL VENOUS BLD VENIPUNCTURE: CPT

## 2024-01-12 PROCEDURE — 80061 LIPID PANEL: CPT

## 2024-01-22 ENCOUNTER — HOSPITAL ENCOUNTER (OUTPATIENT)
Dept: RADIOLOGY | Facility: HOSPITAL | Age: 64
Discharge: HOME | End: 2024-01-22
Attending: NURSE PRACTITIONER
Payer: MEDICAID

## 2024-01-22 DIAGNOSIS — Z12.31 BREAST CANCER SCREENING BY MAMMOGRAM: ICD-10-CM

## 2024-01-22 PROCEDURE — 77063 BREAST TOMOSYNTHESIS BI: CPT | Mod: 26,,, | Performed by: RADIOLOGY

## 2024-01-22 PROCEDURE — 77067 SCR MAMMO BI INCL CAD: CPT | Mod: 26,,, | Performed by: RADIOLOGY

## 2024-01-22 PROCEDURE — 77067 SCR MAMMO BI INCL CAD: CPT | Mod: TC

## 2024-01-25 ENCOUNTER — OFFICE VISIT (OUTPATIENT)
Dept: INTERNAL MEDICINE | Facility: CLINIC | Age: 64
End: 2024-01-25
Payer: MEDICAID

## 2024-01-25 VITALS
HEART RATE: 81 BPM | DIASTOLIC BLOOD PRESSURE: 82 MMHG | WEIGHT: 189 LBS | BODY MASS INDEX: 38.1 KG/M2 | SYSTOLIC BLOOD PRESSURE: 132 MMHG | TEMPERATURE: 98 F | RESPIRATION RATE: 18 BRPM | HEIGHT: 59 IN

## 2024-01-25 DIAGNOSIS — M25.511 CHRONIC RIGHT SHOULDER PAIN: ICD-10-CM

## 2024-01-25 DIAGNOSIS — E78.5 HYPERLIPIDEMIA, UNSPECIFIED HYPERLIPIDEMIA TYPE: ICD-10-CM

## 2024-01-25 DIAGNOSIS — F17.210 CIGARETTE NICOTINE DEPENDENCE WITHOUT COMPLICATION: ICD-10-CM

## 2024-01-25 DIAGNOSIS — G89.29 CHRONIC RIGHT SHOULDER PAIN: ICD-10-CM

## 2024-01-25 DIAGNOSIS — J44.9 CHRONIC OBSTRUCTIVE PULMONARY DISEASE, UNSPECIFIED COPD TYPE: ICD-10-CM

## 2024-01-25 DIAGNOSIS — E55.9 VITAMIN D DEFICIENCY: ICD-10-CM

## 2024-01-25 DIAGNOSIS — E66.9 OBESITY, UNSPECIFIED CLASSIFICATION, UNSPECIFIED OBESITY TYPE, UNSPECIFIED WHETHER SERIOUS COMORBIDITY PRESENT: ICD-10-CM

## 2024-01-25 PROCEDURE — 99406 BEHAV CHNG SMOKING 3-10 MIN: CPT | Mod: S$PBB,,, | Performed by: NURSE PRACTITIONER

## 2024-01-25 PROCEDURE — 99214 OFFICE O/P EST MOD 30 MIN: CPT | Mod: 25,S$PBB,, | Performed by: NURSE PRACTITIONER

## 2024-01-25 PROCEDURE — 3079F DIAST BP 80-89 MM HG: CPT | Mod: CPTII,,, | Performed by: NURSE PRACTITIONER

## 2024-01-25 PROCEDURE — 3008F BODY MASS INDEX DOCD: CPT | Mod: CPTII,,, | Performed by: NURSE PRACTITIONER

## 2024-01-25 PROCEDURE — 1160F RVW MEDS BY RX/DR IN RCRD: CPT | Mod: CPTII,,, | Performed by: NURSE PRACTITIONER

## 2024-01-25 PROCEDURE — 3075F SYST BP GE 130 - 139MM HG: CPT | Mod: CPTII,,, | Performed by: NURSE PRACTITIONER

## 2024-01-25 PROCEDURE — 3044F HG A1C LEVEL LT 7.0%: CPT | Mod: CPTII,,, | Performed by: NURSE PRACTITIONER

## 2024-01-25 PROCEDURE — 99214 OFFICE O/P EST MOD 30 MIN: CPT | Mod: PBBFAC | Performed by: NURSE PRACTITIONER

## 2024-01-25 PROCEDURE — 1159F MED LIST DOCD IN RCRD: CPT | Mod: CPTII,,, | Performed by: NURSE PRACTITIONER

## 2024-01-25 RX ORDER — VENLAFAXINE HYDROCHLORIDE 37.5 MG/1
37.5 CAPSULE, EXTENDED RELEASE ORAL DAILY
Qty: 90 CAPSULE | Refills: 1 | Status: SHIPPED | OUTPATIENT
Start: 2024-01-25 | End: 2024-05-27 | Stop reason: SDUPTHER

## 2024-01-25 RX ORDER — HYDROXYZINE PAMOATE 25 MG/1
25 CAPSULE ORAL NIGHTLY PRN
Qty: 30 CAPSULE | Refills: 4 | Status: SHIPPED | OUTPATIENT
Start: 2024-01-25 | End: 2024-05-27

## 2024-01-25 RX ORDER — VENLAFAXINE HYDROCHLORIDE 75 MG/1
75 CAPSULE, EXTENDED RELEASE ORAL DAILY
Qty: 90 CAPSULE | Refills: 1 | Status: SHIPPED | OUTPATIENT
Start: 2024-01-25 | End: 2024-05-27 | Stop reason: SDUPTHER

## 2024-01-25 RX ORDER — ATORVASTATIN CALCIUM 20 MG/1
20 TABLET, FILM COATED ORAL NIGHTLY
Qty: 90 TABLET | Refills: 1 | Status: SHIPPED | OUTPATIENT
Start: 2024-01-25 | End: 2024-05-27 | Stop reason: SDUPTHER

## 2024-01-25 RX ORDER — METHYLPREDNISOLONE 4 MG/1
TABLET ORAL
Qty: 21 EACH | Refills: 0 | Status: SHIPPED | OUTPATIENT
Start: 2024-01-25 | End: 2024-02-15

## 2024-01-25 NOTE — PROGRESS NOTES
[facility]  TRACEY Brown     Patient Name: Twila Felix   : 1960  MRN:[id number]     Chief Complaint     Chief Complaint   Patient presents with    Follow-up     Lab review        History of Present Illness     63 year old white female, presents in clinic for lab f/u, continues to report chronic right shoulder pain, trying to exercise shoulder at home.     PMH HLD, HTN, PVD, DIANA on CPAP, COPD, tobacco user 1/2ppd, depression, chronic right knee pain. Mood stable.Taking Effexor 75mg and 37.5mg daily.Was tried on hctz and losartan in the past but stopped due to it dropping her too low. Lisinopril caused angioedema. Enrolled in smoking cessation classes/medications. Following Dr. Hannah for peripheral vascular reflux, left leg venous procedure on 2023. Pt is using CPAP nightly, benefiting from use and would like to continue use of CPAP. Sleep study 3 yrs prior.     Denies chest pain, shortness of breath, cough, fever, headache, dizziness, weakness, abdominal pain, nausea, vomiting, diarrhea, constipation, dysuria, SI, HI, anxiety.     MMG 2024; BIRADS 1; sister of pt recently diagnosed breast cancer.     Following [facility]  Cologuard 10/2022 positive, colonoscopy at [facility] on 2023; adenoma polyps, repeat in 5 yrs ().  LDCT 2023: Lung-RADS Category:  2 - Benign Appearance or Behavior - continue annual screening with LDCT in 12 months.There are changes seen consistent with COPD in the lungs bilaterally  PFT 2023:  moderate restriction, no significant bronchodilator response.            Review of Systems     Review of Systems   Constitutional: Negative.    HENT: Negative.     Eyes: Negative.    Respiratory: Negative.     Cardiovascular: Negative.    Gastrointestinal: Negative.    Endocrine: Negative.    Genitourinary: Negative.    Musculoskeletal:  Positive for arthralgias.        Right shoulder pain   Integumentary:  Negative.   Allergic/Immunologic: Negative.   "  Neurological: Negative.    Hematological: Negative.    Psychiatric/Behavioral: Negative.     All other systems reviewed and are negative.       Physical Examination     Visit Vitals  /82 (BP Location: Left arm, Patient Position: Sitting, BP Method: Medium (Automatic))   Pulse 81   Temp 97.9 °F (36.6 °C) (Oral)   Resp 18   Ht 4' 11" (1.499 m)   Wt 85.7 kg (189 lb)   BMI 38.17 kg/m²        BP Readings from Last 6 Encounters:   01/25/24 132/82   10/04/23 (!) 158/99   09/13/23 138/84   07/31/23 125/73   07/13/23 119/81   06/13/23 138/82   ]    Wt Readings from Last 6 Encounters:   01/25/24 85.7 kg (189 lb)   10/04/23 81.6 kg (180 lb)   09/13/23 83.5 kg (184 lb)   07/31/23 81.1 kg (178 lb 12.8 oz)   07/13/23 82.1 kg (180 lb 14.4 oz)   06/13/23 83 kg (183 lb)   ]      Physical Exam  Vitals and nursing note reviewed.   Constitutional:       Appearance: Normal appearance.   HENT:      Head: Normocephalic and atraumatic.      Right Ear: Tympanic membrane, ear canal and external ear normal.      Left Ear: Tympanic membrane, ear canal and external ear normal.      Nose: Nose normal.      Mouth/Throat:      Mouth: Mucous membranes are moist.      Pharynx: Oropharynx is clear.   Eyes:      Extraocular Movements: Extraocular movements intact.      Conjunctiva/sclera: Conjunctivae normal.      Pupils: Pupils are equal, round, and reactive to light.   Cardiovascular:      Rate and Rhythm: Normal rate and regular rhythm.      Pulses: Normal pulses.      Heart sounds: Normal heart sounds.   Pulmonary:      Effort: Pulmonary effort is normal.      Breath sounds: Normal breath sounds.   Abdominal:      General: Abdomen is flat. Bowel sounds are normal.      Palpations: Abdomen is soft.   Musculoskeletal:         General: Normal range of motion.      Cervical back: Normal range of motion and neck supple.   Skin:     General: Skin is warm and dry.      Capillary Refill: Capillary refill takes less than 2 seconds.   Neurological: "      General: No focal deficit present.      Mental Status: She is alert and oriented to person, place, and time. Mental status is at baseline.   Psychiatric:         Mood and Affect: Mood normal.         Behavior: Behavior normal.         Thought Content: Thought content normal.         Judgment: Judgment normal.          Labs / Imaging     Chemistry:  Lab Results   Component Value Date     01/12/2024    K 4.3 01/12/2024    CHLORIDE 108 (H) 01/12/2024    BUN 18.5 01/12/2024    CREATININE 0.63 01/12/2024    EGFRNORACEVR >60 01/12/2024    GLUCOSE 96 01/12/2024    CALCIUM 9.2 01/12/2024    ALKPHOS 104 01/12/2024    LABPROT 7.2 01/12/2024    ALBUMIN 3.7 01/12/2024    BILIDIR 0.2 03/10/2022    IBILI 0.20 03/10/2022    AST 22 01/12/2024    ALT 22 01/12/2024    MG 1.70 10/04/2023    IVFDMGRG38YT 44.9 01/12/2024        Lab Results   Component Value Date    HGBA1C 5.4 01/12/2024        Hematology:  Lab Results   Component Value Date    WBC 6.60 01/12/2024    RBC 4.48 01/12/2024    HGB 14.0 01/12/2024    HCT 44.1 01/12/2024    MCV 98.4 (H) 01/12/2024    MCH 31.3 (H) 01/12/2024    MCHC 31.7 (L) 01/12/2024    RDW 13.0 01/12/2024     01/12/2024    MPV 12.8 (H) 01/12/2024        Lipid Panel:  Lab Results   Component Value Date    CHOL 164 01/12/2024    HDL 40 01/12/2024    .00 01/12/2024    TRIG 90 01/12/2024    TOTALCHOLEST 4 01/12/2024        Urine:  Lab Results   Component Value Date    COLORUA Light-Yellow 10/04/2023    APPEARANCEUA Clear 10/04/2023    SGUA 1.025 10/04/2023    PHUA 5.5 10/04/2023    PROTEINUA Negative 10/04/2023    GLUCOSEUA Negative 10/04/2023    KETONESUA Negative 10/04/2023    BLOODUA Negative 10/04/2023    NITRITESUA Negative 10/04/2023    LEUKOCYTESUR Negative 10/04/2023    RBCUA 0-2 10/04/2023    WBCUA 0-5 10/04/2023    BACTERIA Rare 10/04/2023    SQEPUA Occ (A) 07/13/2023    HYALINECASTS None Seen 07/13/2023          Assessment       ICD-10-CM ICD-9-CM   1. Hyperlipidemia,  unspecified hyperlipidemia type  E78.5 272.4   2. Chronic obstructive pulmonary disease, unspecified COPD type  J44.9 496   3. Cigarette nicotine dependence without complication [F17.210]  F17.210 305.1   4. Obesity, unspecified classification, unspecified obesity type, unspecified whether serious comorbidity present  E66.9 278.00   5. Vitamin D deficiency  E55.9 268.9   6. Chronic right shoulder pain  M25.511 719.41    G89.29 338.29        Plan     1. Hyperlipidemia, unspecified hyperlipidemia type  Lab Results   Component Value Date    .00 01/12/2024    CHOL 164 01/12/2024    HDL 40 01/12/2024    TRIG 90 01/12/2024       Cont RX daily; refills given. Take Omega 3 daily.   Stressed importance of dietary modifications. Follow a low cholesterol, low saturated fat diet with less that 200mg of cholesterol a day.  Avoid fried foods and high saturated fats (high saturated fats less than 7% of calories).  Add Flax Seed/Fish Oil supplements to diet. Increase dietary fiber.  Regular exercise can reduce LDL and raise HDL. Stressed importance of physical activity 5 times per week for 30 minutes per day.      2. Chronic obstructive pulmonary disease, unspecified COPD type  Last PFT- /Last CXR-   Use inhalers as prescribed (rinse mouth after use of steroid inhalers).    Use long term inhalers daily and rescue inhaler as needed.    Avoid triggers (high humidity, strong odors, chemical fumes).    Report signs of upper respiratory infection as soon as possible for early treatment.    Practice/encouraged abdominal breathing.   Eat smaller, more frequent meals.   Flu shot recommended yearly.    Smoking cessation encouraged     3. Cigarette nicotine dependence without complication [F17.210]  Smoking cessation advised. Instructed on smoking cessation program through [facility] and pharmacological interventions to aid in cessation.  >5 minutes allotted to educate patient on the harms of smoking, the urgency to quit, and the development  of a plan for smoking cessation.     4. Obesity, unspecified classification, unspecified obesity type, unspecified whether serious comorbidity present  Goal BMI <30.  Exercise 5 times a week for 30 minutes per day.  Avoid soda, simple sugars, excessive rice, potatoes or bread. Limit fast foods and fried foods.  Choose complex carbs in moderation (example: green vegetables, beans, oatmeal). Eat plenty of fresh fruits and vegetables with lean meats daily.  Do not skip meals. Eat a balanced portion size.  Avoid fad diets. Consider permanent healthy life style changes.       5. Vitamin D deficiency  Vitamin D level reviewed and is currently at goal, between 30-80 ng/mL. Continue OTC Vitamin D3 2000 IU daily.     6. Chronic right shoulder pain  RX MEDROL mera  Conservative YADIRA therapy (Protect from injury, relative rest, Ice or Heat, NSAIDs or Tylenol, Compress and Elevate to reduce swelling, Stretches and Strengthening exercises).       Current Outpatient Medications   Medication Instructions    atorvastatin (LIPITOR) 20 mg, Oral, Nightly    cetirizine (ZYRTEC) 10 mg, Oral    fluticasone propionate (FLONASE) 50 mcg/actuation nasal spray SMARTSI-2 Spray(s) Both Nares Daily    hydrOXYzine pamoate (VISTARIL) 25 mg, Oral, Nightly PRN    ibuprofen (ADVIL,MOTRIN) 800 mg, Oral    methylPREDNISolone (MEDROL DOSEPACK) 4 mg tablet use as directed    mv-mn/iron/folic acid/herb 190 (VITAMIN D3 COMPLETE ORAL)   Daily, 0 Refill(s)    traMADoL (ULTRAM) 50 mg, Oral    venlafaxine (EFFEXOR-XR) 37.5 mg, Oral, Daily    venlafaxine (EFFEXOR-XR) 75 mg, Oral, Daily       Orders Placed This Encounter   Procedures    CBC Auto Differential    Comprehensive Metabolic Panel    Lipid Panel    Urinalysis    Vitamin D         Future Appointments   Date Time Provider Department Center   2024  1:00 PM [facility]    2024  8:10 AM [facility]         Follow up in  about 4 months (around 5/25/2024) for lab review.    Labs thoroughly reviewed with patient. Medication refills addressed today.  RTC prn and 4 months, with labs 1 week prior to the apt.  COVID 19 precautions given to patient.  Patient voices understanding of all discharge instructions.      TRACEY Brown

## 2024-02-08 ENCOUNTER — TELEPHONE (OUTPATIENT)
Dept: SMOKING CESSATION | Facility: CLINIC | Age: 64
End: 2024-02-08
Payer: MEDICAID

## 2024-02-26 ENCOUNTER — TELEPHONE (OUTPATIENT)
Dept: SMOKING CESSATION | Facility: CLINIC | Age: 64
End: 2024-02-26
Payer: MEDICAID

## 2024-02-29 ENCOUNTER — TELEPHONE (OUTPATIENT)
Dept: SMOKING CESSATION | Facility: CLINIC | Age: 64
End: 2024-02-29
Payer: MEDICAID

## 2024-05-24 ENCOUNTER — LAB VISIT (OUTPATIENT)
Dept: LAB | Facility: HOSPITAL | Age: 64
End: 2024-05-24
Attending: NURSE PRACTITIONER
Payer: MEDICAID

## 2024-05-24 DIAGNOSIS — E55.9 VITAMIN D DEFICIENCY: ICD-10-CM

## 2024-05-24 DIAGNOSIS — E78.5 HYPERLIPIDEMIA, UNSPECIFIED HYPERLIPIDEMIA TYPE: ICD-10-CM

## 2024-05-24 LAB
25(OH)D3+25(OH)D2 SERPL-MCNC: 45 NG/ML (ref 30–80)
ALBUMIN SERPL-MCNC: 3.6 G/DL (ref 3.4–4.8)
ALBUMIN/GLOB SERPL: 1 RATIO (ref 1.1–2)
ALP SERPL-CCNC: 109 UNIT/L (ref 40–150)
ALT SERPL-CCNC: 32 UNIT/L (ref 0–55)
ANION GAP SERPL CALC-SCNC: 8 MEQ/L
AST SERPL-CCNC: 26 UNIT/L (ref 5–34)
BASOPHILS # BLD AUTO: 0.02 X10(3)/MCL
BASOPHILS NFR BLD AUTO: 0.2 %
BILIRUB SERPL-MCNC: 0.4 MG/DL
BUN SERPL-MCNC: 14.4 MG/DL (ref 9.8–20.1)
CALCIUM SERPL-MCNC: 9.6 MG/DL (ref 8.4–10.2)
CHLORIDE SERPL-SCNC: 108 MMOL/L (ref 98–107)
CHOLEST SERPL-MCNC: 179 MG/DL
CHOLEST/HDLC SERPL: 4 {RATIO} (ref 0–5)
CO2 SERPL-SCNC: 27 MMOL/L (ref 23–31)
CREAT SERPL-MCNC: 0.67 MG/DL (ref 0.55–1.02)
CREAT/UREA NIT SERPL: 21
EOSINOPHIL # BLD AUTO: 0.1 X10(3)/MCL (ref 0–0.9)
EOSINOPHIL NFR BLD AUTO: 1.2 %
ERYTHROCYTE [DISTWIDTH] IN BLOOD BY AUTOMATED COUNT: 13 % (ref 11.5–17)
GFR SERPLBLD CREATININE-BSD FMLA CKD-EPI: >60 ML/MIN/1.73/M2
GLOBULIN SER-MCNC: 3.7 GM/DL (ref 2.4–3.5)
GLUCOSE SERPL-MCNC: 105 MG/DL (ref 82–115)
HCT VFR BLD AUTO: 46.4 % (ref 37–47)
HDLC SERPL-MCNC: 43 MG/DL (ref 35–60)
HGB BLD-MCNC: 15.4 G/DL (ref 12–16)
IMM GRANULOCYTES # BLD AUTO: 0.02 X10(3)/MCL (ref 0–0.04)
IMM GRANULOCYTES NFR BLD AUTO: 0.2 %
LDLC SERPL CALC-MCNC: 112 MG/DL (ref 50–140)
LYMPHOCYTES # BLD AUTO: 1.57 X10(3)/MCL (ref 0.6–4.6)
LYMPHOCYTES NFR BLD AUTO: 19.2 %
MCH RBC QN AUTO: 32.6 PG (ref 27–31)
MCHC RBC AUTO-ENTMCNC: 33.2 G/DL (ref 33–36)
MCV RBC AUTO: 98.1 FL (ref 80–94)
MONOCYTES # BLD AUTO: 0.68 X10(3)/MCL (ref 0.1–1.3)
MONOCYTES NFR BLD AUTO: 8.3 %
NEUTROPHILS # BLD AUTO: 5.79 X10(3)/MCL (ref 2.1–9.2)
NEUTROPHILS NFR BLD AUTO: 70.9 %
NRBC BLD AUTO-RTO: 0 %
PLATELET # BLD AUTO: 172 X10(3)/MCL (ref 130–400)
PMV BLD AUTO: 12.4 FL (ref 7.4–10.4)
POTASSIUM SERPL-SCNC: 4.5 MMOL/L (ref 3.5–5.1)
PROT SERPL-MCNC: 7.3 GM/DL (ref 5.8–7.6)
RBC # BLD AUTO: 4.73 X10(6)/MCL (ref 4.2–5.4)
SODIUM SERPL-SCNC: 143 MMOL/L (ref 136–145)
TRIGL SERPL-MCNC: 120 MG/DL (ref 37–140)
VLDLC SERPL CALC-MCNC: 24 MG/DL
WBC # SPEC AUTO: 8.18 X10(3)/MCL (ref 4.5–11.5)

## 2024-05-24 PROCEDURE — 80061 LIPID PANEL: CPT

## 2024-05-24 PROCEDURE — 82306 VITAMIN D 25 HYDROXY: CPT

## 2024-05-24 PROCEDURE — 80053 COMPREHEN METABOLIC PANEL: CPT

## 2024-05-24 PROCEDURE — 36415 COLL VENOUS BLD VENIPUNCTURE: CPT

## 2024-05-24 PROCEDURE — 85025 COMPLETE CBC W/AUTO DIFF WBC: CPT

## 2024-05-27 ENCOUNTER — OFFICE VISIT (OUTPATIENT)
Dept: INTERNAL MEDICINE | Facility: CLINIC | Age: 64
End: 2024-05-27
Payer: MEDICAID

## 2024-05-27 VITALS
TEMPERATURE: 97 F | SYSTOLIC BLOOD PRESSURE: 126 MMHG | WEIGHT: 190.06 LBS | DIASTOLIC BLOOD PRESSURE: 78 MMHG | BODY MASS INDEX: 38.32 KG/M2 | HEART RATE: 62 BPM | RESPIRATION RATE: 16 BRPM | HEIGHT: 59 IN

## 2024-05-27 DIAGNOSIS — J44.9 CHRONIC OBSTRUCTIVE PULMONARY DISEASE, UNSPECIFIED COPD TYPE: ICD-10-CM

## 2024-05-27 DIAGNOSIS — F17.210 CIGARETTE NICOTINE DEPENDENCE WITHOUT COMPLICATION: ICD-10-CM

## 2024-05-27 DIAGNOSIS — E78.5 HYPERLIPIDEMIA, UNSPECIFIED HYPERLIPIDEMIA TYPE: ICD-10-CM

## 2024-05-27 DIAGNOSIS — E66.9 OBESITY, UNSPECIFIED CLASSIFICATION, UNSPECIFIED OBESITY TYPE, UNSPECIFIED WHETHER SERIOUS COMORBIDITY PRESENT: ICD-10-CM

## 2024-05-27 DIAGNOSIS — E55.9 VITAMIN D DEFICIENCY: ICD-10-CM

## 2024-05-27 PROCEDURE — 3044F HG A1C LEVEL LT 7.0%: CPT | Mod: CPTII,,, | Performed by: NURSE PRACTITIONER

## 2024-05-27 PROCEDURE — 1160F RVW MEDS BY RX/DR IN RCRD: CPT | Mod: CPTII,,, | Performed by: NURSE PRACTITIONER

## 2024-05-27 PROCEDURE — 3078F DIAST BP <80 MM HG: CPT | Mod: CPTII,,, | Performed by: NURSE PRACTITIONER

## 2024-05-27 PROCEDURE — 3008F BODY MASS INDEX DOCD: CPT | Mod: CPTII,,, | Performed by: NURSE PRACTITIONER

## 2024-05-27 PROCEDURE — 3074F SYST BP LT 130 MM HG: CPT | Mod: CPTII,,, | Performed by: NURSE PRACTITIONER

## 2024-05-27 PROCEDURE — 1159F MED LIST DOCD IN RCRD: CPT | Mod: CPTII,,, | Performed by: NURSE PRACTITIONER

## 2024-05-27 PROCEDURE — 99214 OFFICE O/P EST MOD 30 MIN: CPT | Mod: S$PBB,,, | Performed by: NURSE PRACTITIONER

## 2024-05-27 PROCEDURE — 99214 OFFICE O/P EST MOD 30 MIN: CPT | Mod: PBBFAC | Performed by: NURSE PRACTITIONER

## 2024-05-27 RX ORDER — ATORVASTATIN CALCIUM 20 MG/1
20 TABLET, FILM COATED ORAL NIGHTLY
Qty: 90 TABLET | Refills: 1 | Status: SHIPPED | OUTPATIENT
Start: 2024-05-27

## 2024-05-27 RX ORDER — HYDROXYZINE PAMOATE 25 MG/1
25 CAPSULE ORAL NIGHTLY PRN
Qty: 30 CAPSULE | Refills: 4 | Status: SHIPPED | OUTPATIENT
Start: 2024-05-27

## 2024-05-27 RX ORDER — AMLODIPINE BESYLATE 2.5 MG/1
2.5 TABLET ORAL NIGHTLY
COMMUNITY
Start: 2024-05-13

## 2024-05-27 RX ORDER — VENLAFAXINE HYDROCHLORIDE 75 MG/1
75 CAPSULE, EXTENDED RELEASE ORAL DAILY
Qty: 90 CAPSULE | Refills: 1 | Status: SHIPPED | OUTPATIENT
Start: 2024-05-27

## 2024-05-27 RX ORDER — VENLAFAXINE HYDROCHLORIDE 37.5 MG/1
37.5 CAPSULE, EXTENDED RELEASE ORAL DAILY
Qty: 90 CAPSULE | Refills: 1 | Status: SHIPPED | OUTPATIENT
Start: 2024-05-27

## 2024-05-27 NOTE — PROGRESS NOTES
[facility]  TRACEY Brown     Patient Name: Twila Felix   : 1960  MRN:[id number]     Chief Complaint     Chief Complaint   Patient presents with    Follow-up     Lab review        History of Present Illness     63 year old white female, presents in clinic for lab f/u, no acute concerns.     PMH HLD, HTN, PVD, DIANA on CPAP, COPD, tobacco user 1/2ppd, depression, chronic right knee pain. Mood stable.Taking Effexor 75mg and 37.5mg daily.Was tried on hctz and losartan in the past but stopped due to it dropping her too low. Lisinopril caused angioedema. Enrolled in smoking cessation classes/medications. Following Dr. Hannah for peripheral vascular reflux, left leg venous procedure on 2023. Pt is using CPAP nightly, benefiting from use and would like to continue use of CPAP. Sleep study 3 yrs prior.     Denies chest pain, shortness of breath, cough, fever, headache, dizziness, weakness, abdominal pain, nausea, vomiting, diarrhea, constipation, dysuria, SI, HI, anxiety.     MMG 2024; BIRADS 1; sister of pt recently diagnosed breast cancer.     Following [facility] GYN  Cologuard 10/2022 positive, colonoscopy at [facility] on 2023; adenoma polyps, repeat in 5 yrs ().  LDCT 2023: Lung-RADS Category:  2 - Benign Appearance or Behavior - continue annual screening with LDCT in 12 months.There are changes seen consistent with COPD in the lungs bilaterally  PFT 2023: moderate restriction, no significant bronchodilator response.               Review of Systems     Review of Systems   Constitutional: Negative.    HENT: Negative.     Eyes: Negative.    Respiratory: Negative.     Cardiovascular: Negative.    Gastrointestinal: Negative.    Endocrine: Negative.    Genitourinary: Negative.    Musculoskeletal: Negative.    Integumentary:  Negative.   Allergic/Immunologic: Negative.    Neurological: Negative.    Hematological: Negative.    Psychiatric/Behavioral: Negative.     All other  "systems reviewed and are negative.       Physical Examination     Visit Vitals  /78 (BP Location: Left arm, Patient Position: Sitting, BP Method: Large (Automatic))   Pulse 62   Temp 97.4 °F (36.3 °C) (Oral)   Resp 16   Ht 4' 11" (1.499 m)   Wt 86.2 kg (190 lb 0.6 oz)   BMI 38.38 kg/m²        BP Readings from Last 6 Encounters:   05/27/24 126/78   01/25/24 132/82   10/04/23 (!) 158/99   09/13/23 138/84   07/31/23 125/73   07/13/23 119/81   ]    Wt Readings from Last 6 Encounters:   05/27/24 86.2 kg (190 lb 0.6 oz)   01/25/24 85.7 kg (189 lb)   10/04/23 81.6 kg (180 lb)   09/13/23 83.5 kg (184 lb)   07/31/23 81.1 kg (178 lb 12.8 oz)   07/13/23 82.1 kg (180 lb 14.4 oz)   ]    BMI Readings from Last 3 Encounters:   05/27/24 38.38 kg/m²   01/25/24 38.17 kg/m²   10/04/23 36.36 kg/m²         Physical Exam  Vitals and nursing note reviewed.   Constitutional:       Appearance: Normal appearance.   HENT:      Head: Normocephalic and atraumatic.      Right Ear: Tympanic membrane, ear canal and external ear normal.      Left Ear: Tympanic membrane, ear canal and external ear normal.      Nose: Nose normal.      Mouth/Throat:      Mouth: Mucous membranes are moist.      Pharynx: Oropharynx is clear.   Eyes:      Extraocular Movements: Extraocular movements intact.      Conjunctiva/sclera: Conjunctivae normal.      Pupils: Pupils are equal, round, and reactive to light.   Cardiovascular:      Rate and Rhythm: Normal rate and regular rhythm.      Pulses: Normal pulses.      Heart sounds: Normal heart sounds.   Pulmonary:      Effort: Pulmonary effort is normal.      Breath sounds: Normal breath sounds.   Abdominal:      General: Abdomen is flat. Bowel sounds are normal.      Palpations: Abdomen is soft.   Musculoskeletal:         General: Normal range of motion.      Cervical back: Normal range of motion and neck supple.   Skin:     General: Skin is warm and dry.      Capillary Refill: Capillary refill takes less than 2 " seconds.   Neurological:      General: No focal deficit present.      Mental Status: She is alert and oriented to person, place, and time. Mental status is at baseline.   Psychiatric:         Mood and Affect: Mood normal.         Behavior: Behavior normal.         Thought Content: Thought content normal.         Judgment: Judgment normal.          Labs / Imaging     Chemistry:  Lab Results   Component Value Date     05/24/2024    K 4.5 05/24/2024    CHLORIDE 108 (H) 05/24/2024    BUN 14.4 05/24/2024    CREATININE 0.67 05/24/2024    EGFRNORACEVR >60 05/24/2024    GLUCOSE 105 05/24/2024    CALCIUM 9.6 05/24/2024    ALKPHOS 109 05/24/2024    LABPROT 7.3 05/24/2024    ALBUMIN 3.6 05/24/2024    BILIDIR 0.2 03/10/2022    IBILI 0.20 03/10/2022    AST 26 05/24/2024    ALT 32 05/24/2024    MG 1.70 10/04/2023    BOLOXIYD33QI 45 05/24/2024        Lab Results   Component Value Date    HGBA1C 5.4 01/12/2024        Hematology:  Lab Results   Component Value Date    WBC 8.18 05/24/2024    RBC 4.73 05/24/2024    HGB 15.4 05/24/2024    HCT 46.4 05/24/2024    MCV 98.1 (H) 05/24/2024    MCH 32.6 (H) 05/24/2024    MCHC 33.2 05/24/2024    RDW 13.0 05/24/2024     05/24/2024    MPV 12.4 (H) 05/24/2024        Lipid Panel:  Lab Results   Component Value Date    CHOL 179 05/24/2024    HDL 43 05/24/2024    .00 05/24/2024    TRIG 120 05/24/2024    TOTALCHOLEST 4 05/24/2024        Urine:  Lab Results   Component Value Date    COLORUA Light-Yellow 10/04/2023    APPEARANCEUA Clear 10/04/2023    SGUA 1.025 10/04/2023    PHUA 5.5 10/04/2023    PROTEINUA Negative 10/04/2023    GLUCOSEUA Negative 10/04/2023    KETONESUA Negative 10/04/2023    BLOODUA Negative 10/04/2023    NITRITESUA Negative 10/04/2023    LEUKOCYTESUR Negative 10/04/2023    RBCUA 0-2 10/04/2023    WBCUA 0-5 10/04/2023    BACTERIA Rare 10/04/2023    SQEPUA Occ (A) 07/13/2023    HYALINECASTS None Seen 07/13/2023          Assessment       ICD-10-CM ICD-9-CM   1.  Hyperlipidemia, unspecified hyperlipidemia type  E78.5 272.4   2. Cigarette nicotine dependence without complication [F17.210]  F17.210 305.1   3. Obesity, unspecified classification, unspecified obesity type, unspecified whether serious comorbidity present  E66.9 278.00   4. Vitamin D deficiency  E55.9 268.9   5. Chronic obstructive pulmonary disease, unspecified COPD type  J44.9 496        Plan     1. Hyperlipidemia, unspecified hyperlipidemia type  Lab Results   Component Value Date    .00 05/24/2024    CHOL 179 05/24/2024    HDL 43 05/24/2024    TRIG 120 05/24/2024       Cont RX daily; refills given. Take Omega 3 daily.   Stressed importance of dietary modifications. Follow a low cholesterol, low saturated fat diet with less that 200mg of cholesterol a day.  Avoid fried foods and high saturated fats (high saturated fats less than 7% of calories).  Add Flax Seed/Fish Oil supplements to diet. Increase dietary fiber.  Regular exercise can reduce LDL and raise HDL. Stressed importance of physical activity 5 times per week for 30 minutes per day.    - CBC Auto Differential; Future  - Comprehensive Metabolic Panel; Future  - Lipid Panel; Future  - TSH; Future  - Urinalysis; Future    2. Cigarette nicotine dependence without complication [F17.210]  Smoking cessation advised. Instructed on smoking cessation program through [facility] and pharmacological interventions to aid in cessation.  >5 minutes allotted to educate patient on the harms of smoking, the urgency to quit, and the development of a plan for smoking cessation.     - CT Chest Lung Screening Low Dose; Future  - Complete PFT with bronchodilator; Future    3. Obesity, unspecified classification, unspecified obesity type, unspecified whether serious comorbidity present  Goal BMI <30.  Exercise 5 times a week for 30 minutes per day.  Avoid soda, simple sugars, excessive rice, potatoes or bread. Limit fast foods and fried foods.  Choose complex carbs in  moderation (example: green vegetables, beans, oatmeal). Eat plenty of fresh fruits and vegetables with lean meats daily.  Do not skip meals. Eat a balanced portion size.  Avoid fad diets. Consider permanent healthy life style changes.       4. Vitamin D deficiency  Vitamin D level reviewed and is currently at goal, between 30-80 ng/mL. Continue OTC Vitamin D3 2000 IU daily.   - Vitamin D; Future    5. Chronic obstructive pulmonary disease, unspecified COPD type    Use inhalers as prescribed (rinse mouth after use of steroid inhalers).    Use long term inhalers daily and rescue inhaler as needed.    Avoid triggers (high humidity, strong odors, chemical fumes).    Report signs of upper respiratory infection as soon as possible for early treatment.    Practice/encouraged abdominal breathing.   Eat smaller, more frequent meals.   Flu shot recommended yearly.    Smoking cessation encouraged   - Complete PFT with bronchodilator; Future        Current Outpatient Medications   Medication Instructions    amLODIPine (NORVASC) 2.5 mg, Oral, Nightly    atorvastatin (LIPITOR) 20 mg, Oral, Nightly    hydrOXYzine pamoate (VISTARIL) 25 mg, Oral, Nightly PRN    ibuprofen (ADVIL,MOTRIN) 800 mg, Oral    mv-mn/iron/folic acid/herb 190 (VITAMIN D3 COMPLETE ORAL)   Daily, 0 Refill(s)    venlafaxine (EFFEXOR-XR) 37.5 mg, Oral, Daily    venlafaxine (EFFEXOR-XR) 75 mg, Oral, Daily       Orders Placed This Encounter   Procedures    CT Chest Lung Screening Low Dose    CBC Auto Differential    Comprehensive Metabolic Panel    Lipid Panel    TSH    Urinalysis    Vitamin D    Complete PFT with bronchodilator         Future Appointments   Date Time Provider Department Center   7/18/2024  8:10 AM Bryanna Torres, [facility]   12/2/2024  8:00 AM Swapna Luis, [facility]        Follow up in about 6 months (around 11/27/2024) for lab review.    Labs thoroughly reviewed with patient. Medication refills  addressed today.  RTC prn and 6 months, with labs 1 week prior to the apt.  COVID 19 precautions given to patient.  Patient voices understanding of all discharge instructions.      TRACEY Brown

## 2024-07-09 ENCOUNTER — HOSPITAL ENCOUNTER (OUTPATIENT)
Dept: RADIOLOGY | Facility: HOSPITAL | Age: 64
Discharge: HOME | End: 2024-07-09
Attending: NURSE PRACTITIONER
Payer: MEDICAID

## 2024-07-09 DIAGNOSIS — Z87.891 PERSONAL HISTORY OF SMOKING: ICD-10-CM

## 2024-07-09 PROCEDURE — 71271 CT THORAX LUNG CANCER SCR C-: CPT | Mod: TC

## 2024-07-10 ENCOUNTER — HOSPITAL ENCOUNTER (EMERGENCY)
Facility: HOSPITAL | Age: 64
Discharge: HOME | End: 2024-07-10
Attending: EMERGENCY MEDICINE
Payer: MEDICAID

## 2024-07-10 VITALS
WEIGHT: 192.88 LBS | OXYGEN SATURATION: 99 % | HEIGHT: 59 IN | SYSTOLIC BLOOD PRESSURE: 158 MMHG | HEART RATE: 88 BPM | RESPIRATION RATE: 20 BRPM | DIASTOLIC BLOOD PRESSURE: 94 MMHG | BODY MASS INDEX: 38.88 KG/M2 | TEMPERATURE: 98 F
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
## 2024-07-10 DIAGNOSIS — M25.571 RIGHT ANKLE PAIN: ICD-10-CM

## 2024-07-10 PROCEDURE — 25000003 PHARM REV CODE 250: Performed by: PHYSICIAN ASSISTANT

## 2024-07-10 PROCEDURE — 99284 EMERGENCY DEPT VISIT MOD MDM: CPT | Mod: 25

## 2024-07-10 RX ORDER — IBUPROFEN 400 MG/1
800 TABLET ORAL
Status: COMPLETED | OUTPATIENT
Start: 2024-07-10 | End: 2024-07-10

## 2024-07-10 RX ORDER — MUPIROCIN 20 MG/G
OINTMENT TOPICAL 2 TIMES DAILY
Qty: 30 G | Refills: 0 | Status: SHIPPED | OUTPATIENT
Start: 2024-07-10 | End: 2024-07-15

## 2024-07-10 RX ORDER — DICLOFENAC SODIUM 75 MG/1
75 TABLET, DELAYED RELEASE ORAL 2 TIMES DAILY PRN
Qty: 30 TABLET | Refills: 0 | Status: SHIPPED | OUTPATIENT
Start: 2024-07-10

## 2024-07-10 RX ORDER — METHOCARBAMOL 500 MG/1
500 TABLET, FILM COATED ORAL 3 TIMES DAILY PRN
Qty: 15 TABLET | Refills: 0 | Status: SHIPPED | OUTPATIENT
Start: 2024-07-10 | End: 2024-07-15

## 2024-07-10 RX ADMIN — IBUPROFEN 800 MG: 400 TABLET, FILM COATED ORAL at 08:07

## 2024-07-10 NOTE — ED PROVIDER NOTES
Encounter Date: 7/10/2024       History     Chief Complaint   Patient presents with    Knee Injury     Tripped on step this morning, fell onto L knee, c/o L knee pain, abrasion to L knee, and R ankle abrasion, denies any head injury     63-year-old female with a history of anxiety, depression, hyperlipidemia & hypertension, presents to the emergency department with complaints of left knee and right ankle pain and abrasion due to a trip and fall going up the steps when bringing her dog  to get groomed just prior to arrival.     Patient rates her pain 7/10. She denies head injury, loss of consciousness, abdominal pain, chest pain, nausea, vomiting.      The history is provided by the patient. No  was used.     Review of patient's allergies indicates:   Allergen Reactions    Lisinopril      Other reaction(s): angioedema     Past Medical History:   Diagnosis Date    Anxiety disorder, unspecified     Depression     Hyperlipidemia     Hypertension      Past Surgical History:   Procedure Laterality Date    ABDOMINAL HERNIA REPAIR      APPENDECTOMY       SECTION       SECTION      COLONOSCOPY, WITH POLYPECTOMY USING HOT BIOPSY FORCEPS  2023    Procedure: COLONOSCOPY, WITH POLYPECTOMY USING cold BIOPSY FORCEPS;  Surgeon: Amanuel Herrera MD;  Location: [facility] ENDOSCOPY;  Service: Endoscopy;;    COLONOSCOPY, WITH POLYPECTOMY USING SNARE N/A 2023    Procedure: COLONOSCOPY, WITH POLYPECTOMY USING SNARE;  Surgeon: Amanuel Herrera MD;  Location: [facility] ENDOSCOPY;  Service: Endoscopy;  Laterality: N/A;  descending colon polyp not retrived    TUBAL LIGATION       Family History   Problem Relation Name Age of Onset    Hypertension Mother      Heart disease Mother      Hypertension Father       Social History     Tobacco Use    Smoking status: Every Day     Current packs/day: 1.00     Average packs/day: 0.9 packs/day for 51.5 years (47.0 ttl pk-yrs)     Types: Cigarettes      Start date: 1973    Smokeless tobacco: Never   Substance Use Topics    Alcohol use: Not Currently    Drug use: Never     Review of Systems   Respiratory:  Negative for cough, chest tightness and shortness of breath.    Cardiovascular:  Negative for chest pain and palpitations.   Gastrointestinal:  Negative for abdominal pain, diarrhea, nausea and vomiting.   Musculoskeletal:  Negative for back pain and neck pain.        Left knee pain, right ankle pain   Skin:  Positive for wound (knee left).   Neurological:  Negative for dizziness, light-headedness, numbness and headaches.       Physical Exam     Initial Vitals [07/10/24 0724]   BP Pulse Resp Temp SpO2   (!) 163/98 87 18 97.5 °F (36.4 °C) 97 %      MAP       --         Physical Exam    Nursing note and vitals reviewed.  Constitutional: She appears well-developed and well-nourished.   HENT:   Head: Normocephalic and atraumatic.   Nose: Nose normal.   Eyes: Conjunctivae and EOM are normal. Pupils are equal, round, and reactive to light.   Neck: Neck supple.   Normal range of motion.  Cardiovascular:  Normal rate, regular rhythm, normal heart sounds and intact distal pulses.           Pulmonary/Chest: Breath sounds normal.   Abdominal: Abdomen is soft. Bowel sounds are normal. There is no abdominal tenderness. There is no rebound and no guarding.   Musculoskeletal:         General: Normal range of motion.      Cervical back: Normal range of motion and neck supple.      Left knee: Tenderness present.        Legs:      Neurological: She is alert and oriented to person, place, and time. GCS score is 15. GCS eye subscore is 4. GCS verbal subscore is 5. GCS motor subscore is 6.   Skin: Skin is warm. Capillary refill takes less than 2 seconds.         ED Course   Procedures  Labs Reviewed - No data to display       Imaging Results              X-Ray Knee Complete 4 or More Views Left (Final result)  Result time 07/10/24 08:42:43      Final result by Jose Jones MD  (07/10/24 08:42:43)                   Impression:      No acute osseous process appreciated.      Electronically signed by: Jose Jones  Date:    07/10/2024  Time:    08:42               Narrative:    EXAMINATION:  XR KNEE COMP 4 OR MORE VIEWS LEFT    CLINICAL HISTORY:  Unspecified fall, initial encounter    TECHNIQUE:  AP, lateral, and oblique views of the left knee.    COMPARISON:  None    FINDINGS:  No acute fracture identified.  Joint alignments are maintained.  No significant knee effusion.                                       X-Ray Ankle Complete Right (Final result)  Result time 07/10/24 08:41:37      Final result by Jose Jones MD (07/10/24 08:41:37)                   Impression:      No acute osseous process identified.      Electronically signed by: Jose Jones  Date:    07/10/2024  Time:    08:41               Narrative:    EXAMINATION:  XR ANKLE COMPLETE 3 VIEW RIGHT    CLINICAL HISTORY:  Pain in right ankle and joints of right foot    TECHNIQUE:  AP, lateral, and oblique images of the right ankle    COMPARISON:  None    FINDINGS:  No acute fracture identified.  Joint alignments are maintained.  Small posterior and plantar calcaneal enthesophytes.                                       Medications   ibuprofen tablet 800 mg (800 mg Oral Given 7/10/24 0801)     Medical Decision Making  63-year-old female with a history of anxiety, depression, hyperlipidemia & hypertension, presents to the emergency department with complaints of left knee and right ankle pain and abrasion due to a trip and fall going up the steps when bringing her dog  to get groomed just prior to arrival.     Patient rates her pain 7/10. She denies head injury, loss of consciousness, abdominal pain, chest pain, nausea, vomiting.      DDx: abrasion, fracture, contusion, soft tissue injury    Ibuprofen 800 mg given in the ED.  XR left knee and right ankle:No acute osseous process identified.  Wounds were cleaned with Betadine and  normal saline and bandaged.  Prescribed Bactroban, diclofenac and Robaxin.  She will follow up with her doctor.       Amount and/or Complexity of Data Reviewed  Radiology: ordered. Decision-making details documented in ED Course.    Risk  Prescription drug management.               ED Course as of 07/10/24 0858   Wed Jul 10, 2024   0850 X-Ray Knee Complete 4 or More Views Left  No acute osseous process appreciated.    [ER]   0850 X-Ray Ankle Complete Right  No acute osseous process identified.      [ER]   0857 The patient is resting comfortably and in no acute distress.  She states that her symptoms have improved after treatment in Emergency Department. I personally discussed her test results and treatment plan.  Gave strict ED precautions, discussed specific conditions for return to the emergency department and importance of follow up with her primary care provider.  Patient voices understanding and agrees to the plan discussed. All patients' questions have been answered at this time.   She has remained hemodynamically stable throughout entire stay in ED and is stable for discharge home.  [ER]      ED Course User Index  [ER] Mary Lou Madrid PA                             Clinical Impression:  Final diagnoses:  [W19.XXXA] Fall  [M25.571] Right ankle pain          ED Disposition Condition    Discharge Stable          ED Prescriptions       Medication Sig Dispense Start Date End Date Auth. Provider    mupirocin (BACTROBAN) 2 % ointment Apply topically 2 (two) times daily. for 5 days 30 g 7/10/2024 7/15/2024 Mary Lou Madrid PA    diclofenac (VOLTAREN) 75 MG EC tablet Take 1 tablet (75 mg total) by mouth 2 (two) times daily as needed (pain). Do not take this with Advil, Ibuprofen, Motrin, Asprin, or Toradol. 30 tablet 7/10/2024 -- Mary Lou Madrid PA    methocarbamoL (ROBAXIN) 500 MG Tab Take 1 tablet (500 mg total) by mouth 3 (three) times daily as needed (muscle spasm). For muscle spasm 15 tablet 7/10/2024 7/15/2024  Mary Lou Madrid PA          Follow-up Information       Follow up With Specialties Details Why Contact Info    [facility] - Emergency Dept Emergency Medicine  As needed, If symptoms worsen [address] [facility] [id number] [phone]             Mary Lou Madrid PA  07/10/24 0886

## 2024-07-10 NOTE — DISCHARGE INSTRUCTIONS
Take medications as prescribed as needed for pain with food and plenty of water.    Follow up with your doctor within 2-3 days and return if symptoms worsen.

## 2024-07-16 ENCOUNTER — TELEPHONE (OUTPATIENT)
Dept: INTERNAL MEDICINE | Facility: CLINIC | Age: 64
End: 2024-07-16
Payer: MEDICAID

## 2024-07-16 NOTE — TELEPHONE ENCOUNTER
----- Message from TRACEY Brown sent at 7/14/2024  3:20 PM CDT -----  Please inform pt there are no acute findings on CT lungs, will repeat test in one year. Avoid smoking.

## 2024-07-16 NOTE — TELEPHONE ENCOUNTER
Please inform pt; Emphysema is a type of chronic obstructive pulmonary disease (COPD), which is a group of lung conditions that make it difficult to breathe. In emphysema, the air sacs in the lungs are damaged, making it hard to move air in and out.   Pt's prior CT chest 6/2023 also showed COPD findings, related to smoking. Smoking cessation advised. Instruct pt on our available smoking cessation program through [facility] and that pharmacological interventions are available to aid in cessation.  Keep next apt for further discussion.

## 2024-07-16 NOTE — TELEPHONE ENCOUNTER
Spoke to pt. Informed her there are no acute findings on CT lungs, will repeat test in one year and advised to avoid smoking per PCP .Pt stated she read  her  CT results and it stated she has mild emphysema. States she would like more information on this . Please advise.

## 2024-07-18 ENCOUNTER — OFFICE VISIT (OUTPATIENT)
Dept: GYNECOLOGY | Facility: CLINIC | Age: 64
End: 2024-07-18
Payer: MEDICAID

## 2024-07-18 VITALS
OXYGEN SATURATION: 99 % | SYSTOLIC BLOOD PRESSURE: 121 MMHG | DIASTOLIC BLOOD PRESSURE: 82 MMHG | BODY MASS INDEX: 39.55 KG/M2 | RESPIRATION RATE: 18 BRPM | TEMPERATURE: 98 F | WEIGHT: 196.19 LBS | HEART RATE: 82 BPM | HEIGHT: 59 IN

## 2024-07-18 DIAGNOSIS — N89.8 VAGINAL ODOR: ICD-10-CM

## 2024-07-18 DIAGNOSIS — R14.0 BLOATING: ICD-10-CM

## 2024-07-18 DIAGNOSIS — Z01.419 WOMEN'S ANNUAL ROUTINE GYNECOLOGICAL EXAMINATION: Primary | ICD-10-CM

## 2024-07-18 DIAGNOSIS — Z12.31 SCREENING MAMMOGRAM FOR BREAST CANCER: ICD-10-CM

## 2024-07-18 LAB
C TRACH DNA SPEC QL NAA+PROBE: NOT DETECTED
CLUE CELLS VAG QL WET PREP: ABNORMAL
N GONORRHOEA DNA SPEC QL NAA+PROBE: NOT DETECTED
SOURCE (OHS): NORMAL
T VAGINALIS VAG QL WET PREP: ABNORMAL
WBC #/AREA VAG WET PREP: ABNORMAL
YEAST SPEC QL WET PREP: ABNORMAL

## 2024-07-18 PROCEDURE — 3044F HG A1C LEVEL LT 7.0%: CPT | Mod: CPTII,,, | Performed by: NURSE PRACTITIONER

## 2024-07-18 PROCEDURE — 87210 SMEAR WET MOUNT SALINE/INK: CPT | Performed by: NURSE PRACTITIONER

## 2024-07-18 PROCEDURE — 99214 OFFICE O/P EST MOD 30 MIN: CPT | Mod: PBBFAC | Performed by: NURSE PRACTITIONER

## 2024-07-18 PROCEDURE — 87491 CHLMYD TRACH DNA AMP PROBE: CPT | Performed by: NURSE PRACTITIONER

## 2024-07-18 PROCEDURE — 3079F DIAST BP 80-89 MM HG: CPT | Mod: CPTII,,, | Performed by: NURSE PRACTITIONER

## 2024-07-18 PROCEDURE — 1159F MED LIST DOCD IN RCRD: CPT | Mod: CPTII,,, | Performed by: NURSE PRACTITIONER

## 2024-07-18 PROCEDURE — 3074F SYST BP LT 130 MM HG: CPT | Mod: CPTII,,, | Performed by: NURSE PRACTITIONER

## 2024-07-18 PROCEDURE — 99396 PREV VISIT EST AGE 40-64: CPT | Mod: S$PBB,,, | Performed by: NURSE PRACTITIONER

## 2024-07-18 PROCEDURE — 1160F RVW MEDS BY RX/DR IN RCRD: CPT | Mod: CPTII,,, | Performed by: NURSE PRACTITIONER

## 2024-07-18 PROCEDURE — 3008F BODY MASS INDEX DOCD: CPT | Mod: CPTII,,, | Performed by: NURSE PRACTITIONER

## 2024-07-18 NOTE — PROGRESS NOTES
"Patient ID: Twila Felix is a 63 y.o. female.    Chief Complaint: Well Woman      Review of patient's allergies indicates:   Allergen Reactions    Lisinopril      Other reaction(s): angioedema           HPI:  Pt is  here for annual gyn exam. Last pap 2023=NIL; HPV negative . Pt is postmenopausal.Denies vaginal bleeding or discharge.Pt is not sexually active and has not been in years. Denies vaginal dryness or irritation. Denies dysuria/hematuria. [facility] has BM every other day. Pt had colonoscopy 2023 with 5 year recall.Denies appetite changes or wt loss. Denies breast complaints. Denies fly hx of  ovarian, uterine, or colon cancer. Pt is smoker. Sister with hx of breast cancer. Today, pt c/o vaginal odor. Denies douching or using scented products. Pt also c/o bloating and weight gain. Denies early satiety. She has not attempted diet changes. TSH WNL 2023. Pt currently on venlafaxine 112.5mg daily. [facility] was initially started on this medication for hot flashes but has remained d/t anxiety/depression. [facility] cares for her mother with dementia and venlafaxine improves her mood.   Review of Systems:   Negative except for findings in HPI     Objective:   /82   Pulse 82   Temp 98.2 °F (36.8 °C) (Oral)   Resp 18   Ht 4' 11" (1.499 m)   Wt 89 kg (196 lb 3.2 oz)   SpO2 99%   BMI 39.63 kg/m²    Physical Exam:  GENERAL: Pt is aware and alert and  in no acute distress.  BREASTS: Bilateral-No masses, nipple discharge, skin changes, or tenderness.  ABDOMEN: Soft, non tender. Obese  VULVA:  No lesions or skin changes.  URETHRA: No lesions  BLADDER: No tenderness.  VAGINA: Mucosa normal,no discharge; no lesions.  CERVIX:  no CMT, NO discharge; NO lesions  BIMANUAL EXAM: limited exam secondary to body habitus;  The uterus has poor descent and is nontender. John Paul adnexa reveal no evidence of masses; no fullness however exam limited  SKIN: Warm and Dry  PSYCHIATRIC: Patient is awake and alert. Mood and " affect are normal.    Assessment:   Women's annual routine gynecological examination    Bloating  -     US Pelvis Comp with Transvag NON-OB (xpd; Future; Expected date: 07/25/2024    Screening mammogram for breast cancer  -     Mammo Digital Screening Bilat w/ Yuri; Future; Expected date: 02/13/2025    Vaginal odor  -     Wet Prep, Genital  -     Chlamydia/GC, PCR            1. Women's annual routine gynecological examination    2. Bloating    3. Screening mammogram for breast cancer    4. Vaginal odor             -pap UTD  -Contact clinic with any vaginal bleeding as this would be abnormal in postmenopausal pt.   -pcp recently refilled venlafaxine  -Encouraged healthy diet and exercise. Avoid soda and sugary drinks such as sports drinks and fruit juices.  Encouraged diet low in carbohydrates. Limit intake of rice/pasta/chips/bread/crackers/biscuits/pancakes/etc.  Handouts for healthy food choices provided to pt. Will check pelvic US to r/o ovarian causes of bloating/weight gain.   Plan:       Follow up in about 1 year (around 7/18/2025).

## 2024-07-29 ENCOUNTER — HOSPITAL ENCOUNTER (OUTPATIENT)
Dept: PULMONOLOGY | Facility: HOSPITAL | Age: 64
Discharge: HOME | End: 2024-07-29
Attending: NURSE PRACTITIONER
Payer: MEDICAID

## 2024-07-29 DIAGNOSIS — F17.210 CIGARETTE NICOTINE DEPENDENCE WITHOUT COMPLICATION: ICD-10-CM

## 2024-07-29 DIAGNOSIS — J44.9 CHRONIC OBSTRUCTIVE PULMONARY DISEASE, UNSPECIFIED COPD TYPE: ICD-10-CM

## 2024-07-29 LAB
DLCO SINGLE BREATH LLN: 12.7
DLCO SINGLE BREATH PRE REF: 50.9 %
DLCO SINGLE BREATH REF: 18.44
DLCOC SBVA LLN: 2.81
DLCOC SBVA REF: 4.69
DLCOC SINGLE BREATH LLN: 12.7
DLCOC SINGLE BREATH REF: 18.44
DLCOVA LLN: 2.81
DLCOVA PRE REF: 59.5 %
DLCOVA PRE: 2.79 ML/(MIN*MMHG*L) (ref 2.81–6.56)
DLCOVA REF: 4.69
ERV LLN: -16449.33
ERV PRE REF: 33.6 %
ERV REF: 0.67
FEF 25 75 LLN: 1.19
FEF 25 75 PRE REF: 72.9 %
FEF 25 75 REF: 2.59
FEV1 FVC LLN: 67
FEV1 FVC PRE REF: 98.7 %
FEV1 FVC REF: 80
FEV1 LLN: 1.42
FEV1 PRE REF: 94.5 %
FEV1 REF: 1.91
FRCPLETH LLN: 1.54
FRCPLETH PREREF: 97 %
FRCPLETH REF: 2.36
FVC LLN: 1.79
FVC PRE REF: 95.4 %
FVC REF: 2.41
IVC PRE: 2.14 L (ref 1.79–3.05)
IVC SINGLE BREATH LLN: 1.79
IVC SINGLE BREATH PRE REF: 89 %
IVC SINGLE BREATH REF: 2.41
MVV LLN: 55
MVV PRE REF: 102.5 %
MVV REF: 65
PEF LLN: 3.77
PEF PRE REF: 84 %
PEF REF: 5.18
PRE DLCO: 9.38 ML/(MIN*MMHG) (ref 12.7–24.17)
PRE ERV: 0.23 L (ref -16449.33–16450.67)
PRE FEF 25 75: 1.88 L/S (ref 1.19–3.98)
PRE FET 100: 6.41 SEC
PRE FEV1 FVC: 78.53 % (ref 66.84–90.51)
PRE FEV1: 1.81 L (ref 1.42–2.38)
PRE FRC PL: 2.29 L (ref 1.54–3.19)
PRE FVC: 2.3 L (ref 1.79–3.05)
PRE MVV: 66.39 L/MIN (ref 55.05–74.48)
PRE PEF: 4.35 L/S (ref 3.77–6.59)
PRE RV: 2.07 L (ref 1.11–2.27)
PRE TLC: 4.43 L (ref 2.95–4.92)
RAW LLN: 3.06
RAW PRE REF: 147.2 %
RAW PRE: 4.5 CMH2O*S/L (ref 3.06–3.06)
RAW REF: 3.06
RV LLN: 1.11
RV PRE REF: 122.3 %
RV REF: 1.69
RVTLC LLN: 31
RVTLC PRE REF: 114.7 %
RVTLC PRE: 46.72 % (ref 31.13–50.31)
RVTLC REF: 41
TLC LLN: 2.95
TLC PRE REF: 112.5 %
TLC REF: 3.93
VA PRE: 3.36 L (ref 3.78–3.78)
VA SINGLE BREATH LLN: 3.78
VA SINGLE BREATH PRE REF: 88.8 %
VA SINGLE BREATH REF: 3.78
VC LLN: 1.79
VC PRE REF: 97.9 %
VC PRE: 2.36 L (ref 1.79–3.05)
VC REF: 2.41

## 2024-07-29 PROCEDURE — 94726 PLETHYSMOGRAPHY LUNG VOLUMES: CPT

## 2024-07-29 PROCEDURE — 94729 DIFFUSING CAPACITY: CPT

## 2024-07-29 PROCEDURE — 94010 BREATHING CAPACITY TEST: CPT

## 2024-07-30 RX ORDER — ATORVASTATIN CALCIUM 20 MG/1
20 TABLET, FILM COATED ORAL NIGHTLY
Qty: 90 TABLET | Refills: 1 | Status: SHIPPED | OUTPATIENT
Start: 2024-07-30

## 2024-07-30 RX ORDER — HYDROXYZINE PAMOATE 25 MG/1
25 CAPSULE ORAL NIGHTLY PRN
Qty: 30 CAPSULE | Refills: 4 | OUTPATIENT
Start: 2024-07-30

## 2024-08-02 ENCOUNTER — HOSPITAL ENCOUNTER (OUTPATIENT)
Dept: RADIOLOGY | Facility: HOSPITAL | Age: 64
Discharge: HOME | End: 2024-08-02
Attending: NURSE PRACTITIONER
Payer: MEDICARE

## 2024-08-02 DIAGNOSIS — R14.0 BLOATING: ICD-10-CM

## 2024-08-02 PROCEDURE — 76830 TRANSVAGINAL US NON-OB: CPT | Mod: TC

## 2024-08-02 PROCEDURE — 76856 US EXAM PELVIC COMPLETE: CPT | Mod: TC

## 2024-08-12 ENCOUNTER — OFFICE VISIT (OUTPATIENT)
Dept: INTERNAL MEDICINE | Facility: CLINIC | Age: 64
End: 2024-08-12
Payer: MEDICARE

## 2024-08-12 VITALS
BODY MASS INDEX: 38.91 KG/M2 | RESPIRATION RATE: 16 BRPM | SYSTOLIC BLOOD PRESSURE: 110 MMHG | TEMPERATURE: 98 F | HEIGHT: 59 IN | WEIGHT: 193 LBS | HEART RATE: 61 BPM | DIASTOLIC BLOOD PRESSURE: 74 MMHG | OXYGEN SATURATION: 96 %

## 2024-08-12 DIAGNOSIS — E66.9 OBESITY, UNSPECIFIED CLASSIFICATION, UNSPECIFIED OBESITY TYPE, UNSPECIFIED WHETHER SERIOUS COMORBIDITY PRESENT: ICD-10-CM

## 2024-08-12 DIAGNOSIS — Z78.0 ENCOUNTER FOR OSTEOPOROSIS SCREENING IN ASYMPTOMATIC POSTMENOPAUSAL PATIENT: ICD-10-CM

## 2024-08-12 DIAGNOSIS — F17.210 CIGARETTE NICOTINE DEPENDENCE WITHOUT COMPLICATION: ICD-10-CM

## 2024-08-12 DIAGNOSIS — G47.33 OSA ON CPAP: ICD-10-CM

## 2024-08-12 DIAGNOSIS — Z13.820 ENCOUNTER FOR OSTEOPOROSIS SCREENING IN ASYMPTOMATIC POSTMENOPAUSAL PATIENT: ICD-10-CM

## 2024-08-12 DIAGNOSIS — J44.9 CHRONIC OBSTRUCTIVE PULMONARY DISEASE, UNSPECIFIED COPD TYPE: ICD-10-CM

## 2024-08-12 PROCEDURE — 99406 BEHAV CHNG SMOKING 3-10 MIN: CPT | Mod: S$PBB,,, | Performed by: NURSE PRACTITIONER

## 2024-08-12 PROCEDURE — 99214 OFFICE O/P EST MOD 30 MIN: CPT | Mod: PBBFAC | Performed by: NURSE PRACTITIONER

## 2024-08-12 PROCEDURE — 1101F PT FALLS ASSESS-DOCD LE1/YR: CPT | Mod: CPTII,,, | Performed by: NURSE PRACTITIONER

## 2024-08-12 PROCEDURE — 3074F SYST BP LT 130 MM HG: CPT | Mod: CPTII,,, | Performed by: NURSE PRACTITIONER

## 2024-08-12 PROCEDURE — 99214 OFFICE O/P EST MOD 30 MIN: CPT | Mod: S$PBB,25,, | Performed by: NURSE PRACTITIONER

## 2024-08-12 PROCEDURE — 1159F MED LIST DOCD IN RCRD: CPT | Mod: CPTII,,, | Performed by: NURSE PRACTITIONER

## 2024-08-12 PROCEDURE — 3044F HG A1C LEVEL LT 7.0%: CPT | Mod: CPTII,,, | Performed by: NURSE PRACTITIONER

## 2024-08-12 PROCEDURE — 3078F DIAST BP <80 MM HG: CPT | Mod: CPTII,,, | Performed by: NURSE PRACTITIONER

## 2024-08-12 PROCEDURE — 3288F FALL RISK ASSESSMENT DOCD: CPT | Mod: CPTII,,, | Performed by: NURSE PRACTITIONER

## 2024-08-12 PROCEDURE — 3008F BODY MASS INDEX DOCD: CPT | Mod: CPTII,,, | Performed by: NURSE PRACTITIONER

## 2024-08-12 PROCEDURE — 1160F RVW MEDS BY RX/DR IN RCRD: CPT | Mod: CPTII,,, | Performed by: NURSE PRACTITIONER

## 2024-08-12 RX ORDER — ALBUTEROL SULFATE 90 UG/1
2 INHALANT RESPIRATORY (INHALATION) EVERY 6 HOURS PRN
Qty: 8 G | Refills: 1 | Status: SHIPPED | OUTPATIENT
Start: 2024-08-12

## 2024-08-12 RX ORDER — IBUPROFEN 200 MG
1 TABLET ORAL DAILY
Qty: 14 PATCH | Refills: 0 | Status: SHIPPED | OUTPATIENT
Start: 2024-08-12

## 2024-08-12 RX ORDER — NICOTINE 7MG/24HR
1 PATCH, TRANSDERMAL 24 HOURS TRANSDERMAL DAILY
Qty: 14 PATCH | Refills: 0 | Status: SHIPPED | OUTPATIENT
Start: 2024-08-12

## 2024-08-12 NOTE — PROGRESS NOTES
Internal Medicine Clinic  TRACEY Brown     Patient Name: Twila Felix   : 1960  MRN:[id number]     Chief Complaint     Chief Complaint   Patient presents with    Sleep Apnea     Paper work needed for  new C-Pap machine        History of Present Illness     64 year old white female, presents in clinic for PFT results; no acute concerns.  PFT 24: flow rates and lung volumes are normal. Reduced DLCO is likely due to loss of alveolar surface area for gas exchange. Also needs form completed from [employer] for renewal of CPAP supplies. Pt is using CPAP nightly, benefiting from use and would like to continue use of CPAP. Sleep study 3 yrs prior. Smokes one pack per day. States KAUFFMAN, with exercise. She is starting a walking/exercise program, plans to walk 1 hr per day, 7 days per week.     PMH HLD, HTN, PVD, DIANA on CPAP, COPD, tobacco user 1ppd, depression, chronic right knee pain. Mood stable.Taking Effexor 75mg and 37.5mg daily.Was tried on hctz and losartan in the past but stopped due to it dropping her too low. Lisinopril caused angioedema. Enrolled in smoking cessation classes/medications. Following Dr. Hannah for peripheral vascular reflux, left leg venous procedure on 2023. Pt is using CPAP nightly, benefiting from use and would like to continue use of CPAP. Sleep study 3 yrs prior.     Denies chest pain, shortness of breath, cough, fever, headache, dizziness, weakness, abdominal pain, nausea, vomiting, diarrhea, constipation, dysuria, SI, HI, anxiety.     MMG 2024; BIRADS 1; sister of pt recently diagnosed breast cancer.     Following [facility] GYN  Cologuard 10/2022 positive, colonoscopy at [facility] on 2023; adenoma polyps, repeat in 5 yrs ().  LDCT 2023: Lung-RADS Category:  2 - Benign Appearance or Behavior - continue annual screening with LDCT in 12 months.There are changes seen consistent with COPD in the lungs bilaterally  PFT 2023: moderate restriction, no significant  "bronchodilator response.  PFT 8/7/24: flow rates and lung volumes are normal. Reduced DLCO is likely due to loss of alveolar surface area for gas exchange.                 Review of Systems     Review of Systems   Constitutional: Negative.    HENT: Negative.     Eyes: Negative.    Respiratory:  Positive for shortness of breath.         On exertion only   Cardiovascular: Negative.    Gastrointestinal: Negative.    Endocrine: Negative.    Genitourinary: Negative.    Musculoskeletal: Negative.    Integumentary:  Negative.   Allergic/Immunologic: Negative.    Neurological: Negative.    Hematological: Negative.    Psychiatric/Behavioral: Negative.     All other systems reviewed and are negative.       Physical Examination     Visit Vitals  /74 (BP Location: Left arm, Patient Position: Sitting, BP Method: Medium (Automatic))   Pulse 61   Temp 97.9 °F (36.6 °C) (Oral)   Resp 16   Ht 4' 11" (1.499 m)   Wt 87.5 kg (193 lb)   SpO2 96%   BMI 38.98 kg/m²        BP Readings from Last 6 Encounters:   08/12/24 110/74   07/18/24 121/82   07/10/24 (!) 158/94   05/27/24 126/78   01/25/24 132/82   10/04/23 (!) 158/99   ]    Wt Readings from Last 6 Encounters:   08/12/24 87.5 kg (193 lb)   07/18/24 89 kg (196 lb 3.2 oz)   07/10/24 87.5 kg (192 lb 14.4 oz)   05/27/24 86.2 kg (190 lb 0.6 oz)   01/25/24 85.7 kg (189 lb)   10/04/23 81.6 kg (180 lb)   ]    BMI Readings from Last 3 Encounters:   08/12/24 38.98 kg/m²   07/18/24 39.63 kg/m²   07/10/24 38.96 kg/m²         Physical Exam  Vitals and nursing note reviewed.   Constitutional:       Appearance: Normal appearance.   HENT:      Head: Normocephalic and atraumatic.      Right Ear: Tympanic membrane, ear canal and external ear normal.      Left Ear: Tympanic membrane, ear canal and external ear normal.      Nose: Nose normal.      Mouth/Throat:      Mouth: Mucous membranes are moist.      Pharynx: Oropharynx is clear.   Eyes:      Extraocular Movements: Extraocular movements intact. "      Conjunctiva/sclera: Conjunctivae normal.      Pupils: Pupils are equal, round, and reactive to light.   Cardiovascular:      Rate and Rhythm: Normal rate and regular rhythm.      Pulses: Normal pulses.      Heart sounds: Normal heart sounds.   Pulmonary:      Effort: Pulmonary effort is normal.      Breath sounds: Normal breath sounds.   Abdominal:      General: Abdomen is flat. Bowel sounds are normal.      Palpations: Abdomen is soft.   Musculoskeletal:         General: Normal range of motion.      Cervical back: Normal range of motion and neck supple.   Skin:     General: Skin is warm and dry.      Capillary Refill: Capillary refill takes less than 2 seconds.   Neurological:      General: No focal deficit present.      Mental Status: She is alert and oriented to person, place, and time. Mental status is at baseline.   Psychiatric:         Mood and Affect: Mood normal.         Behavior: Behavior normal.         Thought Content: Thought content normal.         Judgment: Judgment normal.          Labs / Imaging     Chemistry:  Lab Results   Component Value Date     05/24/2024    K 4.5 05/24/2024    BUN 14.4 05/24/2024    CREATININE 0.67 05/24/2024    EGFRNORACEVR >60 05/24/2024    GLUCOSE 105 05/24/2024    CALCIUM 9.6 05/24/2024    ALKPHOS 109 05/24/2024    LABPROT 7.3 05/24/2024    ALBUMIN 3.6 05/24/2024    BILIDIR 0.2 03/10/2022    IBILI 0.20 03/10/2022    AST 26 05/24/2024    ALT 32 05/24/2024    MG 1.70 10/04/2023    AQOWSLIE35DY 45 05/24/2024        Lab Results   Component Value Date    HGBA1C 5.4 01/12/2024        Hematology:  Lab Results   Component Value Date    WBC 8.18 05/24/2024    RBC 4.73 05/24/2024    HGB 15.4 05/24/2024    HCT 46.4 05/24/2024    MCV 98.1 (H) 05/24/2024    MCH 32.6 (H) 05/24/2024    MCHC 33.2 05/24/2024    RDW 13.0 05/24/2024     05/24/2024    MPV 12.4 (H) 05/24/2024        Lipid Panel:  Lab Results   Component Value Date    CHOL 179 05/24/2024    HDL 43 05/24/2024     .00 05/24/2024    TRIG 120 05/24/2024    TOTALCHOLEST 4 05/24/2024        Urine:  Lab Results   Component Value Date    APPEARANCEUA Clear 10/04/2023    SGUA 1.025 10/04/2023    PROTEINUA Negative 10/04/2023    KETONESUA Negative 10/04/2023    LEUKOCYTESUR Negative 10/04/2023    RBCUA 0-2 10/04/2023    WBCUA 0-5 10/04/2023    BACTERIA Rare 10/04/2023    SQEPUA Occ (A) 07/13/2023    HYALINECASTS None Seen 07/13/2023          Assessment       ICD-10-CM ICD-9-CM   1. DIANA on CPAP  G47.33 327.23   2. Chronic obstructive pulmonary disease, unspecified COPD type  J44.9 496   3. Obesity, unspecified classification, unspecified obesity type, unspecified whether serious comorbidity present  E66.9 278.00   4. Cigarette nicotine dependence without complication [F17.210]  F17.210 305.1   5. Encounter for osteoporosis screening in asymptomatic postmenopausal patient  Z13.820 V82.81    Z78.0 V49.81        Plan     1. DIANA on CPAP  Renewal form completed  Pt is compliant with CPAP use, benefiting from use, and needs to continued use.   Avoid excessive alcohol, smoking and overuse of sedatives at bedtime.  Weight management discussed. Goal BMI <30.  Adjust sleep position PRN.     2. Chronic obstructive pulmonary disease, unspecified COPD type  PFT reviewed  Start on albuterol inhaler prn  Use inhalers as prescribed (rinse mouth after use of steroid inhalers).    Use long term inhalers daily and rescue inhaler as needed.    Avoid triggers (high humidity, strong odors, chemical fumes).    Report signs of upper respiratory infection as soon as possible for early treatment.    Practice/encouraged abdominal breathing.   Eat smaller, more frequent meals.   Flu shot recommended yearly.    Smoking cessation encouraged   - albuterol (VENTOLIN HFA) 90 mcg/actuation inhaler; Inhale 2 puffs into the lungs every 6 (six) hours as needed for Wheezing. Rescue  Dispense: 8 g; Refill: 1    3. Obesity, unspecified classification, unspecified  obesity type, unspecified whether serious comorbidity present  Goal BMI <30.  Exercise 5 times a week for 30 minutes per day.  Avoid soda, simple sugars, excessive rice, potatoes or bread. Limit fast foods and fried foods.  Choose complex carbs in moderation (example: green vegetables, beans, oatmeal). Eat plenty of fresh fruits and vegetables with lean meats daily.  Do not skip meals. Eat a balanced portion size.  Avoid fad diets. Consider permanent healthy life style changes.       4. Cigarette nicotine dependence without complication [F17.210]  Start on nicotine patches  Smoking cessation advised. Instructed on smoking cessation program through [facility] and pharmacological interventions to aid in cessation.  >5 minutes allotted to educate patient on the harms of smoking, the urgency to quit, and the development of a plan for smoking cessation.   - nicotine (NICODERM CQ) 21 mg/24 hr; Place 1 patch onto the skin once daily.  Dispense: 14 patch; Refill: 0  - nicotine (NICODERM CQ) 14 mg/24 hr; Place 1 patch onto the skin once daily.  Dispense: 14 patch; Refill: 0  - nicotine (NICODERM CQ) 7 mg/24 hr; Place 1 patch onto the skin once daily.  Dispense: 14 patch; Refill: 0    5. Encounter for osteoporosis screening in asymptomatic postmenopausal patient    - DXA Bone Density Axial Skeleton 1 or more sites; Future        Current Outpatient Medications   Medication Instructions    albuterol (VENTOLIN HFA) 90 mcg/actuation inhaler 2 puffs, Inhalation, Every 6 hours PRN, Rescue    amLODIPine (NORVASC) 2.5 mg, Oral, Nightly    atorvastatin (LIPITOR) 20 mg, Oral, Nightly    diclofenac (VOLTAREN) 75 mg, Oral, 2 times daily PRN, Do not take this with Advil, Ibuprofen, Motrin, Asprin, or Toradol.    hydrOXYzine pamoate (VISTARIL) 25 mg, Oral, Nightly PRN    mv-mn/iron/folic acid/herb 190 (VITAMIN D3 COMPLETE ORAL)   Daily, 0 Refill(s)    nicotine (NICODERM CQ) 14 mg/24 hr 1 patch, Transdermal, Daily    nicotine (NICODERM CQ) 21  mg/24 hr 1 patch, Transdermal, Daily    nicotine (NICODERM CQ) 7 mg/24 hr 1 patch, Transdermal, Daily    venlafaxine (EFFEXOR-XR) 37.5 mg, Oral, Daily    venlafaxine (EFFEXOR-XR) 75 mg, Oral, Daily       Orders Placed This Encounter   Procedures    DXA Bone Density Axial Skeleton 1 or more sites         Future Appointments   Date Time Provider Department Center   9/18/2024  1:45 PM [facility], [facility] GYN [facility]   12/2/2024  8:00 AM Swapna Luis FNP [facility]    7/23/2025  8:30 AM Bryanna Torres FNP [facility] GYN [facility]        Follow up if symptoms worsen or fail to improve, for wellness, needs medicare wellness GME on a saturday.    Labs thoroughly reviewed with patient. Medication refills addressed today.  RTC prn and as scheduled in Dec, with labs 1 week prior to the apt.  COVID 19 precautions given to patient.  Patient voices understanding of all discharge instructions.      TRACEY Brown

## 2024-08-19 ENCOUNTER — HOSPITAL ENCOUNTER (OUTPATIENT)
Dept: RADIOLOGY | Facility: HOSPITAL | Age: 64
Discharge: HOME | End: 2024-08-19
Attending: NURSE PRACTITIONER
Payer: MEDICARE

## 2024-08-19 DIAGNOSIS — Z13.820 ENCOUNTER FOR OSTEOPOROSIS SCREENING IN ASYMPTOMATIC POSTMENOPAUSAL PATIENT: ICD-10-CM

## 2024-08-19 DIAGNOSIS — Z78.0 ENCOUNTER FOR OSTEOPOROSIS SCREENING IN ASYMPTOMATIC POSTMENOPAUSAL PATIENT: ICD-10-CM

## 2024-08-19 PROCEDURE — 77080 DXA BONE DENSITY AXIAL: CPT | Mod: TC

## 2024-08-24 ENCOUNTER — OFFICE VISIT (OUTPATIENT)
Dept: INTERNAL MEDICINE | Facility: CLINIC | Age: 64
End: 2024-08-24
Payer: MEDICARE

## 2024-08-24 VITALS
TEMPERATURE: 98 F | WEIGHT: 192.88 LBS | HEIGHT: 59 IN | RESPIRATION RATE: 18 BRPM | BODY MASS INDEX: 38.88 KG/M2 | DIASTOLIC BLOOD PRESSURE: 86 MMHG | OXYGEN SATURATION: 96 % | SYSTOLIC BLOOD PRESSURE: 132 MMHG | HEART RATE: 73 BPM

## 2024-08-24 DIAGNOSIS — Z00.00 MEDICARE ANNUAL WELLNESS VISIT, INITIAL: Primary | ICD-10-CM

## 2024-08-24 PROCEDURE — 99214 OFFICE O/P EST MOD 30 MIN: CPT | Mod: PBBFAC | Performed by: STUDENT IN AN ORGANIZED HEALTH CARE EDUCATION/TRAINING PROGRAM

## 2024-08-24 NOTE — PROGRESS NOTES
[facility] SATURDAY WELLNESS CLINIC    PRESENTING HISTORY   History of Present Illness:  Ms. Twila Felix is a 64 y.o. female who presents today for Medicare wellness evaluation. At this time, the patient states having no acute complaints and is compliant with home meds.      PAST HISTORY:     Past Medical History:   Diagnosis Date    Anxiety disorder, unspecified     Depression     Hyperlipidemia     Hypertension       Past Surgical History:   Procedure Laterality Date    ABDOMINAL HERNIA REPAIR      APPENDECTOMY       SECTION       SECTION      COLONOSCOPY, WITH POLYPECTOMY USING HOT BIOPSY FORCEPS  2023    Procedure: COLONOSCOPY, WITH POLYPECTOMY USING cold BIOPSY FORCEPS;  Surgeon: Amanuel Herrera MD;  Location: [facility] ENDOSCOPY;  Service: Endoscopy;;    COLONOSCOPY, WITH POLYPECTOMY USING SNARE N/A 2023    Procedure: COLONOSCOPY, WITH POLYPECTOMY USING SNARE;  Surgeon: Amanuel Herrera MD;  Location: [facility] ENDOSCOPY;  Service: Endoscopy;  Laterality: N/A;  descending colon polyp not retrived    TUBAL LIGATION       Family History   Problem Relation Name Age of Onset    Hypertension Mother      Heart disease Mother      Hypertension Father       Social History     Socioeconomic History    Marital status:     Number of children: 2   Occupational History    Occupation: Unemployed   Tobacco Use    Smoking status: Every Day     Current packs/day: 1.00     Average packs/day: 0.9 packs/day for 51.6 years (47.1 ttl pk-yrs)     Types: Cigarettes     Start date:      Passive exposure: Current    Smokeless tobacco: Never   Substance and Sexual Activity    Alcohol use: Not Currently    Drug use: Never    Sexual activity: Not Currently     Social Determinants of Health     Financial Resource Strain: Low Risk  (2023)    Overall Financial Resource Strain (CARDIA)     Difficulty of Paying Living Expenses: Not hard at all   Food Insecurity: No Food Insecurity (2023)  "   Hunger Vital Sign     Worried About Running Out of Food in the Last Year: Never true     Ran Out of Food in the Last Year: Never true   Transportation Needs: No Transportation Needs (6/13/2023)    PRAPARE - Transportation     Lack of Transportation (Medical): No     Lack of Transportation (Non-Medical): No   Physical Activity: Inactive (6/13/2023)    Exercise Vital Sign     Days of Exercise per Week: 0 days     Minutes of Exercise per Session: 0 min   Stress: No Stress Concern Present (6/13/2023)    [employer] - Occupational Stress Questionnaire     Feeling of Stress : Not at all   Housing Stability: Low Risk  (6/13/2023)    Housing Stability Vital Sign     Unable to Pay for Housing in the Last Year: No     Number of Places Lived in the Last Year: 1     Unstable Housing in the Last Year: No       MEDICATIONS:     Current Outpatient Medications   Medication Instructions    albuterol (VENTOLIN HFA) 90 mcg/actuation inhaler 2 puffs, Inhalation, Every 6 hours PRN, Rescue    amLODIPine (NORVASC) 2.5 mg, Oral, Nightly    atorvastatin (LIPITOR) 20 mg, Oral, Nightly    diclofenac (VOLTAREN) 75 mg, Oral, 2 times daily PRN, Do not take this with Advil, Ibuprofen, Motrin, Asprin, or Toradol.    hydrOXYzine pamoate (VISTARIL) 25 mg, Oral, Nightly PRN    mv-mn/iron/folic acid/herb 190 (VITAMIN D3 COMPLETE ORAL)   Daily, 0 Refill(s)    nicotine (NICODERM CQ) 14 mg/24 hr 1 patch, Transdermal, Daily    nicotine (NICODERM CQ) 21 mg/24 hr 1 patch, Transdermal, Daily    nicotine (NICODERM CQ) 7 mg/24 hr 1 patch, Transdermal, Daily    venlafaxine (EFFEXOR-XR) 37.5 mg, Oral, Daily    venlafaxine (EFFEXOR-XR) 75 mg, Oral, Daily        OBJECTIVE:   Vital Signs:  /86 (BP Location: Right arm, Patient Position: Sitting, BP Method: Small (Automatic))   Pulse 73   Temp 97.9 °F (36.6 °C) (Oral)   Resp 18   Ht 4' 11" (1.499 m)   Wt 87.5 kg (192 lb 14.4 oz)   SpO2 96%   BMI 38.96 kg/m²       Physical " Exam:  General: Obese w/o distress  HEENT: NC/AT; PERRL; nasal and oral mucosa moist and clear  Pulm: CTA bilaterally, normal work of breathing on room air  CV: S1, S2 w/o murmurs or gallops; no edema noted  GI: Soft with normal bowel sounds in all quadrants, no masses on palpation  MSK: Full ROM of all extremities  Derm: No rashes, abnormal bruising, or skin lesions  Neuro: AAOx3; motor/sensory function intact  Psych: Cooperative; appropriate mood and affect    ASSESSMENT & PLAN:   Annual Wellness:    PCP:    Swapna Luis FNP     Medical History/Current Medical Problems:  Patient Active Problem List   Diagnosis    Obesity    Hyperlipidemia    Depression    Tobacco user    Cigarette nicotine dependence without complication    Adenomatous polyp of transverse colon    Adenomatous polyp of descending colon    Adenomatous rectal polyp       Current Risk Factors and Review of Functional Ability:  *Depression Screening per nursing Done  *Domestic Violence/Unsafe Relationship Screening per nursing triage Done  *Fall Risk Screening per nursing triage Done  *In home risk factors per nursing triage Done  *ADLs per nursing triage Done    *HTN - Well Controlled  *Obesity screening - Body mass index is 38.96 kg/m².    *HIV/HEP screening - Done  *HLD- Controlled  *AAA (current 64 male smoking history) - Not Applicable  *Osteoporosis - Done, DXA bone density scan done on 8/19/2024  *Alcohol use - No  *Tobacco use -  Yes (1PPD since age 12, currently still smoking)  *Domestic violence screening - Done     Cancer Screening:  *Breast cancer - Not Done, mammogram ordered on 7/18/2024, instructed patient to complete the test  *Cervical cancer - Done, July 2023  *Colon cancer - Done, 7/31/2023, recommend repeat in 5 years (2028)  *LDCT - Done, 7/9/2024, negative     Vaccines:  *Flu - No, not in season yet  *Tetanus/Tdap -  No, patient deferred to a later date  *Pneumonia -  No, patient deferred to a later date  *Zoster - No,  patient deferred to a later date     Follow-up:   -Instructed patient to complete mammogram that was ordered on 7/18/2024    Aden Mosqueda DO, PGY-III  [facility] Internal Medicine Department

## 2024-08-24 NOTE — PROGRESS NOTES
I have reviewed and concur with the resident's history, physical, assessment, and plan.  I have discussed with him all issues related to the diagnosis, workup and treatment plan. Care provided as reasonable and necessary.    Bjorn Howard MD  [facility]

## 2024-09-04 ENCOUNTER — CLINICAL SUPPORT (OUTPATIENT)
Dept: SMOKING CESSATION | Facility: CLINIC | Age: 64
End: 2024-09-04
Payer: COMMERCIAL

## 2024-09-04 DIAGNOSIS — F17.200 NICOTINE DEPENDENCE, UNCOMPLICATED: Primary | ICD-10-CM

## 2024-09-04 PROCEDURE — 99407 BEHAV CHNG SMOKING > 10 MIN: CPT | Mod: S$GLB,,, | Performed by: GENERAL PRACTICE

## 2024-09-04 NOTE — PROGRESS NOTES
Spoke with patient today in regard to smoking cessation progress for 12 month follow up. She states she is not tobacco free. Patient states she was doing well with he quit when her best friend suddenly passed away. She has not tried since. She said she wants to quit smoking but will discuss it with her doctor when she is ready. Informed patient of benefit period, future follow ups and contact information if any further help is needed. Will complete/ resolve smart form for 3/6/12 month follow up for Quit # 1.

## 2024-09-18 ENCOUNTER — OFFICE VISIT (OUTPATIENT)
Dept: GYNECOLOGY | Facility: CLINIC | Age: 64
End: 2024-09-18
Payer: MEDICARE

## 2024-09-18 VITALS
SYSTOLIC BLOOD PRESSURE: 131 MMHG | HEART RATE: 77 BPM | RESPIRATION RATE: 18 BRPM | DIASTOLIC BLOOD PRESSURE: 84 MMHG | OXYGEN SATURATION: 97 % | BODY MASS INDEX: 39.44 KG/M2 | WEIGHT: 195.63 LBS | HEIGHT: 59 IN | TEMPERATURE: 98 F

## 2024-09-18 DIAGNOSIS — R93.5 ABNORMAL ULTRASOUND OF UTERUS: Primary | ICD-10-CM

## 2024-09-18 PROCEDURE — 99214 OFFICE O/P EST MOD 30 MIN: CPT | Mod: PBBFAC

## 2024-09-18 PROCEDURE — 57505 ENDOCERVICAL CURETTAGE: CPT | Mod: PBBFAC | Performed by: OBSTETRICS & GYNECOLOGY

## 2024-09-18 NOTE — PROGRESS NOTES
[facility] OB/GYN CLINIC NOTE  [facility]  [address] [place] [address]  Phone: [phone]  Fax: [phone]    Subjective:     Twila Felix is a 64 y.o. G***P*** who presents complaining of ***.  Previously had 70mm RWS with knob, fell out same day   Allergies: ***  OBHx:***  GynHx:***   Menarche @ ***, Regular cyclic menses,   LMP: ***  Denies Hx of STIs and abnormal paps***  Contraception: ***, compliant***.    MedHx: ***  Past Medical History:   Diagnosis Date    Anxiety disorder, unspecified     Depression     Hyperlipidemia     Hypertension        SurgHx: ***  Past Surgical History:   Procedure Laterality Date    ABDOMINAL HERNIA REPAIR      APPENDECTOMY       SECTION       SECTION      COLONOSCOPY, WITH POLYPECTOMY USING HOT BIOPSY FORCEPS  2023    Procedure: COLONOSCOPY, WITH POLYPECTOMY USING cold BIOPSY FORCEPS;  Surgeon: Amanuel Herrera MD;  Location: [facility] ENDOSCOPY;  Service: Endoscopy;;    COLONOSCOPY, WITH POLYPECTOMY USING SNARE N/A 2023    Procedure: COLONOSCOPY, WITH POLYPECTOMY USING SNARE;  Surgeon: Amanuel Herrera MD;  Location: [facility] ENDOSCOPY;  Service: Endoscopy;  Laterality: N/A;  descending colon polyp not retrived    TUBAL LIGATION         Medications: ***    Current Outpatient Medications:     amLODIPine (NORVASC) 2.5 MG tablet, Take 2.5 mg by mouth every evening., Disp: , Rfl:     atorvastatin (LIPITOR) 20 MG tablet, Take 1 tablet (20 mg total) by mouth every evening., Disp: 90 tablet, Rfl: 1    hydrOXYzine pamoate (VISTARIL) 25 MG Cap, Take 1 capsule (25 mg total) by mouth nightly as needed (sleep)., Disp: 30 capsule, Rfl: 4    mv-mn/iron/folic acid/herb 190 (VITAMIN D3 COMPLETE ORAL),  Daily, 0 Refill(s), Disp: , Rfl:     venlafaxine (EFFEXOR-XR) 37.5 MG 24 hr capsule, Take 1 capsule (37.5 mg total) by mouth once daily., Disp: 90 capsule, Rfl: 1    venlafaxine (EFFEXOR-XR) 75 MG 24 hr capsule, Take 1 capsule (75 mg total) by mouth once  daily., Disp: 90 capsule, Rfl: 1    albuterol (VENTOLIN HFA) 90 mcg/actuation inhaler, Inhale 2 puffs into the lungs every 6 (six) hours as needed for Wheezing. Rescue (Patient not taking: Reported on 9/18/2024), Disp: 8 g, Rfl: 1    diclofenac (VOLTAREN) 75 MG EC tablet, Take 1 tablet (75 mg total) by mouth 2 (two) times daily as needed (pain). Do not take this with Advil, Ibuprofen, Motrin, Asprin, or Toradol. (Patient not taking: Reported on 8/24/2024), Disp: 30 tablet, Rfl: 0    nicotine (NICODERM CQ) 14 mg/24 hr, Place 1 patch onto the skin once daily. (Patient not taking: Reported on 9/18/2024), Disp: 14 patch, Rfl: 0    nicotine (NICODERM CQ) 21 mg/24 hr, Place 1 patch onto the skin once daily. (Patient not taking: Reported on 9/18/2024), Disp: 14 patch, Rfl: 0    nicotine (NICODERM CQ) 7 mg/24 hr, Place 1 patch onto the skin once daily. (Patient not taking: Reported on 9/18/2024), Disp: 14 patch, Rfl: 0    FM Hx: Denies hx of ovarian, uterine, endometrial, or colon cancer***. Denies history of bleeding or coagulation disorders.***  Family History   Problem Relation Name Age of Onset    Hypertension Mother      Heart disease Mother      Hypertension Father         Social Hx: Denies tobacco, alcohol and illicit drug usage***.  Social History     Socioeconomic History    Marital status:     Number of children: 2   Occupational History    Occupation: Unemployed   Tobacco Use    Smoking status: Every Day     Current packs/day: 1.00     Average packs/day: 0.9 packs/day for 51.7 years (47.2 ttl pk-yrs)     Types: Cigarettes     Start date: 1973     Passive exposure: Current    Smokeless tobacco: Never   Substance and Sexual Activity    Alcohol use: Not Currently    Drug use: Never    Sexual activity: Not Currently     Social Determinants of Health     Financial Resource Strain: Low Risk  (6/13/2023)    Overall Financial Resource Strain (CARDIA)     Difficulty of Paying Living Expenses: Not hard at all    Food Insecurity: No Food Insecurity (6/13/2023)    Hunger Vital Sign     Worried About Running Out of Food in the Last Year: Never true     Ran Out of Food in the Last Year: Never true   Transportation Needs: No Transportation Needs (6/13/2023)    PRAPARE - Transportation     Lack of Transportation (Medical): No     Lack of Transportation (Non-Medical): No   Physical Activity: Inactive (6/13/2023)    Exercise Vital Sign     Days of Exercise per Week: 0 days     Minutes of Exercise per Session: 0 min   Stress: No Stress Concern Present (6/13/2023)    [employer] [place] [employer] - Occupational Stress Questionnaire     Feeling of Stress : Not at all   Housing Stability: Low Risk  (6/13/2023)    Housing Stability Vital Sign     Unable to Pay for Housing in the Last Year: No     Number of Places Lived in the Last Year: 1     Unstable Housing in the Last Year: No       Review of Systems  Denies fevers, chills, headache, blurry vision, nausea, vomiting, dizziness, or syncope. ***  Denies chest pain, shortness of breath, RUQ pain, or calf pain.***    Objective:   There were no vitals filed for this visit.  There is no height or weight on file to calculate BMI.    Physical Exam: ***    General: alert and oriented, in no acute distress  Lungs: clear to auscultation bilaterally, no conversational dyspnea  Heart: regular rate and rhythm  Abdomen: Soft, non-distended, non tender*** to palpation, no involuntary guarding, no rebound tenderness  Extremities: Normal, atraumatic, non-edematous, No cords or calf tenderness, No significant calf/ankle edema  External genitalia: Normal female genitalia without lesion, discharge or tenderness.*** Normal appearing urethral meatus***. Normal appearing external anus.***  Bimanual Exam: No pelvic lymphadenopathy noted bilaterally. Vagina with adequate capacity***. Uterus 8***cm in size, no cervical motion tenderness. Smooth in contour, no masses. Good descent, mobile. ***. No  adnexal fullness/tenderness. Normal urethra. Normal bladder  Speculum Exam: Vaginal mucosa normal in appearance.*** Pink. No masses/lesions. Cervix well visualized, smooth in contour no masses or lesions***. Os normal in appearance, no blood or discharge coming from the os    Note: RN chaperone present for entirety of exam.     Imaging  No images are attached to the encounter.  Assessment/Plan:    Twila Felix is a 64 y.o. G***P*** with ***.    Health maintenance  Pap ***  Mammogram *** Results were BIRADS ***  Colonoscopy ***  Bone density ***  RTC in ***.    Future Appointments   Date Time Provider Department Center   12/2/2024  7:20 AM Swapna Luis, [facility]   7/23/2025  8:30 AM Bryanna Torres, [facility]       Patient and plan were discussed with  ***.

## 2024-09-19 NOTE — ASSESSMENT & PLAN NOTE
Fluid found incidentally in endocervical canal. TVUS otherwise unremarkable, EMS 3mm with scant fluid in endometrium. No vaginal bleeding since menopause over 20 years ago. Likely 2/2 atrophy and cervical stenosis. Pap smear and ECC collected today. Will contact patient with results.

## 2024-09-19 NOTE — PROGRESS NOTES
[facility] GYNECOLOGY CLINIC NOTE     Twila Felix is a 64 y.o.  presenting to GYN clinic for referral from NP. Patient recently seen for annual well woman exam. A TVUS obtained for complaint of bloating. US notable for fluid in endocervical canal, extending into endometrial canal. EMS only 3mm. Patient reports today she has had no vaginal bleeding since menopause at age 41. Denies h/o abnormal pap smears, or procedures on her cervix. Denies abdominal pain or vaginal discharge. She has no complaints today.     Gynecology  Menopause: age 41  Denies h/o STDs or abnormal pap smears  Last pap smear NILM HPV negative in   CS x 2  OB History          2    Para   2    Term   2            AB        Living   2         SAB        IAB        Ectopic        Multiple        Live Births   2                Past Medical History:   Diagnosis Date    Anxiety disorder, unspecified     Depression     Hyperlipidemia     Hypertension       Past Surgical History:   Procedure Laterality Date    ABDOMINAL HERNIA REPAIR      APPENDECTOMY       SECTION       SECTION      COLONOSCOPY, WITH POLYPECTOMY USING HOT BIOPSY FORCEPS  2023    Procedure: COLONOSCOPY, WITH POLYPECTOMY USING cold BIOPSY FORCEPS;  Surgeon: Amanuel Herrera MD;  Location: [facility] ENDOSCOPY;  Service: Endoscopy;;    COLONOSCOPY, WITH POLYPECTOMY USING SNARE N/A 2023    Procedure: COLONOSCOPY, WITH POLYPECTOMY USING SNARE;  Surgeon: Amanuel Herrera MD;  Location: [facility] ENDOSCOPY;  Service: Endoscopy;  Laterality: N/A;  descending colon polyp not retrived    TUBAL LIGATION        Current Outpatient Medications   Medication Instructions    albuterol (VENTOLIN HFA) 90 mcg/actuation inhaler 2 puffs, Inhalation, Every 6 hours PRN, Rescue    amLODIPine (NORVASC) 2.5 mg, Oral, Nightly    atorvastatin (LIPITOR) 20 mg, Oral, Nightly    diclofenac (VOLTAREN) 75 mg, Oral, 2 times daily PRN, Do not take this with Advil,  "Ibuprofen, Motrin, Asprin, or Toradol.    hydrOXYzine pamoate (VISTARIL) 25 mg, Oral, Nightly PRN    mv-mn/iron/folic acid/herb 190 (VITAMIN D3 COMPLETE ORAL)   Daily, 0 Refill(s)    nicotine (NICODERM CQ) 14 mg/24 hr 1 patch, Transdermal, Daily    nicotine (NICODERM CQ) 21 mg/24 hr 1 patch, Transdermal, Daily    nicotine (NICODERM CQ) 7 mg/24 hr 1 patch, Transdermal, Daily    venlafaxine (EFFEXOR-XR) 37.5 mg, Oral, Daily    venlafaxine (EFFEXOR-XR) 75 mg, Oral, Daily     Social History     Tobacco Use    Smoking status: Every Day     Current packs/day: 1.00     Average packs/day: 0.9 packs/day for 51.7 years (47.2 ttl pk-yrs)     Types: Cigarettes     Start date: 1973     Passive exposure: Current    Smokeless tobacco: Never   Substance Use Topics    Alcohol use: Not Currently    Drug use: Never       Review of Systems  Pertinent items are noted in HPI.     Objective:     /84 (BP Location: Right arm, Patient Position: Sitting, BP Method: Small (Automatic))   Pulse 77   Temp 98.1 °F (36.7 °C) (Oral)   Resp 18   Ht 4' 11" (1.499 m)   Wt 88.7 kg (195 lb 9.6 oz)   SpO2 97%   BMI 39.51 kg/m²   Physical Exam:  Gen: Well-nourished, well-developed female appearing stated age. Alert, cooperative, in no acute distress.  Abdomen: Soft, non-tender, no masses.  Extrem: Extremities normal, atraumatic, non-tender calves.  External genitalia: Normal female genitalia without lesion, discharge or tenderness.   Speculum Exam: Vaginal vault without discharge, nonodorous, no lesions/masses seen.  Cervical os visualized as closed, no lesions/masses. Os stenotic   Bimanual Exam: No cervical motion tenderness.  Uterus 8cm freely mobile.  . No adnexal masses. Nontender exam.   Note: RN chaperone present for entirety of exam.    Imaging:  FINDINGS:  UTERUS/CERVIX:     Size: 7 x 3.5 x 3 cm     Masses: None.  Scarring at the anterior lower uterine segment.     Endometrium: 3 mm.  Scant fluid at the endometrial cavity.     Fluid " distends the endocervical canal approximately 1.4 x 0.6 cm in diameter.     RIGHT OVARY:     Attempted visualization of the right ovary.  Right ovary not seen for unknown reason, possibly due to shadowing bowel-gas and/or body habitus.     LEFT OVARY:     Attempted visualization of the left ovary.  Left ovary not seen for unknown reason, possibly due to shadowing bowel gas and/or body habitus.     FREE FLUID:     None     Impression:     Fluid distends the endocervical canal.  Scant fluid at the endometrial cavity.  On a prior sonogram in  there was mild fluid at the endocervical canal, slightly less pronounced than on today's exam.  Correlate for any history of active bleeding.  Consider cervical stenosis.  Please ensure patient is up-to-date with cervical cancer screening.    Procedure     Patient consented prior to procedure. Speculum placed, cervix well visualized. Pap smear collected. Endocervical curettage attempted, os stenotic. Os finder then introduced and ECC repeated.Specimens labeled and sent to pathology. Patient tolerated procedure well.    Assessment:       64 y.o.  here for incidentally found fluid in endocervical canal .  1. Abnormal ultrasound of uterus  Liquid-Based Pap Smear, Screening    Specimen to Pathology Gynecology and Obstetrics        Plan:     Problem List Items Addressed This Visit          Renal/    Abnormal ultrasound of uterus - Primary     Fluid found incidentally in endocervical canal. TVUS otherwise unremarkable, EMS 3mm with scant fluid in endometrium. No vaginal bleeding since menopause over 20 years ago. Likely 2/2 atrophy and cervical stenosis. Pap smear and ECC collected today. Will contact patient with results.          Relevant Orders    Liquid-Based Pap Smear, Screening    Specimen to Pathology Gynecology and Obstetrics       Discussed patient and plan with Dr. Provost Jc Rosado MD  [employer] Obstetrics and Gynecology  PGY-4

## 2024-12-02 ENCOUNTER — OFFICE VISIT (OUTPATIENT)
Dept: INTERNAL MEDICINE | Facility: CLINIC | Age: 64
End: 2024-12-02
Payer: MEDICARE

## 2024-12-02 VITALS
SYSTOLIC BLOOD PRESSURE: 138 MMHG | HEIGHT: 59 IN | BODY MASS INDEX: 39.92 KG/M2 | WEIGHT: 198 LBS | DIASTOLIC BLOOD PRESSURE: 87 MMHG | TEMPERATURE: 98 F | HEART RATE: 62 BPM | RESPIRATION RATE: 18 BRPM

## 2024-12-02 DIAGNOSIS — F17.210 CIGARETTE NICOTINE DEPENDENCE WITHOUT COMPLICATION: ICD-10-CM

## 2024-12-02 DIAGNOSIS — J44.9 CHRONIC OBSTRUCTIVE PULMONARY DISEASE, UNSPECIFIED COPD TYPE: ICD-10-CM

## 2024-12-02 DIAGNOSIS — G47.33 OSA ON CPAP: ICD-10-CM

## 2024-12-02 DIAGNOSIS — E78.5 HYPERLIPIDEMIA, UNSPECIFIED HYPERLIPIDEMIA TYPE: ICD-10-CM

## 2024-12-02 DIAGNOSIS — N39.0 URINARY TRACT INFECTION WITHOUT HEMATURIA, SITE UNSPECIFIED: ICD-10-CM

## 2024-12-02 PROCEDURE — 99214 OFFICE O/P EST MOD 30 MIN: CPT | Mod: S$PBB,,, | Performed by: NURSE PRACTITIONER

## 2024-12-02 PROCEDURE — 99214 OFFICE O/P EST MOD 30 MIN: CPT | Mod: PBBFAC | Performed by: NURSE PRACTITIONER

## 2024-12-02 PROCEDURE — 1160F RVW MEDS BY RX/DR IN RCRD: CPT | Mod: CPTII,,, | Performed by: NURSE PRACTITIONER

## 2024-12-02 PROCEDURE — 3008F BODY MASS INDEX DOCD: CPT | Mod: CPTII,,, | Performed by: NURSE PRACTITIONER

## 2024-12-02 PROCEDURE — 1101F PT FALLS ASSESS-DOCD LE1/YR: CPT | Mod: CPTII,,, | Performed by: NURSE PRACTITIONER

## 2024-12-02 PROCEDURE — 3044F HG A1C LEVEL LT 7.0%: CPT | Mod: CPTII,,, | Performed by: NURSE PRACTITIONER

## 2024-12-02 PROCEDURE — 3075F SYST BP GE 130 - 139MM HG: CPT | Mod: CPTII,,, | Performed by: NURSE PRACTITIONER

## 2024-12-02 PROCEDURE — 3079F DIAST BP 80-89 MM HG: CPT | Mod: CPTII,,, | Performed by: NURSE PRACTITIONER

## 2024-12-02 PROCEDURE — 3288F FALL RISK ASSESSMENT DOCD: CPT | Mod: CPTII,,, | Performed by: NURSE PRACTITIONER

## 2024-12-02 PROCEDURE — 1159F MED LIST DOCD IN RCRD: CPT | Mod: CPTII,,, | Performed by: NURSE PRACTITIONER

## 2024-12-02 RX ORDER — CIPROFLOXACIN 500 MG/1
500 TABLET ORAL 2 TIMES DAILY
Qty: 14 TABLET | Refills: 0 | Status: SHIPPED | OUTPATIENT
Start: 2024-12-02 | End: 2024-12-09

## 2024-12-02 RX ORDER — IBUPROFEN 800 MG/1
800 TABLET ORAL EVERY 6 HOURS PRN
COMMUNITY
Start: 2024-10-17

## 2024-12-02 RX ORDER — HYDROXYZINE PAMOATE 25 MG/1
25 CAPSULE ORAL NIGHTLY PRN
Qty: 30 CAPSULE | Refills: 4 | Status: SHIPPED | OUTPATIENT
Start: 2024-12-02

## 2024-12-02 RX ORDER — ATORVASTATIN CALCIUM 20 MG/1
20 TABLET, FILM COATED ORAL NIGHTLY
Qty: 90 TABLET | Refills: 1 | Status: SHIPPED | OUTPATIENT
Start: 2024-12-02

## 2024-12-02 RX ORDER — BUPROPION HYDROCHLORIDE 150 MG/1
150 TABLET, EXTENDED RELEASE ORAL 2 TIMES DAILY
Qty: 60 TABLET | Refills: 4 | Status: SHIPPED | OUTPATIENT
Start: 2024-12-02 | End: 2025-12-02

## 2024-12-02 RX ORDER — VENLAFAXINE HYDROCHLORIDE 75 MG/1
75 CAPSULE, EXTENDED RELEASE ORAL DAILY
Qty: 90 CAPSULE | Refills: 1 | Status: SHIPPED | OUTPATIENT
Start: 2024-12-02

## 2024-12-02 NOTE — PROGRESS NOTES
Internal Medicine Clinic  TRACEY Brown     Patient Name: Twila Felix   : 1960  MRN:[id number]     Chief Complaint     Chief Complaint   Patient presents with    Hypertension    Hyperlipidemia     Did not do labs    Dysuria     X 1 week        History of Present Illness     64 year old white female, presents in clinic for lab review, but did not do labs. She is complaining of dysuria x 1 wk. Failed smok cessation on patches, asking to try wellbutrin.     PMH HLD, HTN, PVD, DIANA on CPAP, COPD, tobacco user 1ppd, depression, chronic right knee pain. Mood stable.Taking Effexor 75mg and 37.5mg daily.Was tried on hctz and losartan in the past but stopped due to it dropping her too low. Lisinopril caused angioedema. Enrolled in smoking cessation classes/medications. Following Dr. Hannah for peripheral vascular reflux, left leg venous procedure on 2023. Pt is using CPAP nightly, benefiting from use and would like to continue use of CPAP. Sleep study 3 yrs prior.     Denies chest pain, shortness of breath, cough, fever, headache, dizziness, weakness, abdominal pain, nausea, vomiting, diarrhea, constipation, dysuria, SI, HI, anxiety.     MMG 2024; BIRADS 1; sister of pt recently diagnosed breast cancer.     Following [facility] GYN  Cologuard 10/2022 positive, colonoscopy at [facility] on 2023; adenoma polyps, repeat in 5 yrs ().  LDCT 2023: Lung-RADS Category:  2 - Benign Appearance or Behavior - continue annual screening with LDCT in 12 months.There are changes seen consistent with COPD in the lungs bilaterally  PFT 2023: moderate restriction, no significant bronchodilator response.  PFT 24: flow rates and lung volumes are normal. Reduced DLCO is likely due to loss of alveolar surface area for gas exchange.                Review of Systems     Review of Systems   Constitutional: Negative.    HENT: Negative.     Eyes: Negative.    Respiratory: Negative.     Cardiovascular: Negative.   "  Gastrointestinal: Negative.    Endocrine: Negative.    Genitourinary:  Positive for dysuria, frequency and urgency.   Musculoskeletal: Negative.    Integumentary:  Negative.   Allergic/Immunologic: Negative.    Neurological: Negative.    Hematological: Negative.    Psychiatric/Behavioral:  Positive for sleep disturbance. The patient is nervous/anxious.    All other systems reviewed and are negative.       Physical Examination     Visit Vitals  /87 (BP Location: Left arm, Patient Position: Sitting)   Pulse 62   Temp 98.1 °F (36.7 °C) (Oral)   Resp 18   Ht 4' 11" (1.499 m)   Wt 89.8 kg (198 lb)   BMI 39.99 kg/m²        BP Readings from Last 6 Encounters:   12/02/24 138/87   09/18/24 131/84   08/24/24 132/86   08/12/24 110/74   07/18/24 121/82   07/10/24 (!) 158/94   ]    Wt Readings from Last 6 Encounters:   12/02/24 89.8 kg (198 lb)   09/18/24 88.7 kg (195 lb 9.6 oz)   08/24/24 87.5 kg (192 lb 14.4 oz)   08/12/24 87.5 kg (193 lb)   07/18/24 89 kg (196 lb 3.2 oz)   07/10/24 87.5 kg (192 lb 14.4 oz)   ]    BMI Readings from Last 3 Encounters:   12/02/24 39.99 kg/m²   09/18/24 39.51 kg/m²   08/24/24 38.96 kg/m²         Physical Exam  Vitals and nursing note reviewed.   Constitutional:       Appearance: Normal appearance.   HENT:      Head: Normocephalic and atraumatic.      Right Ear: Tympanic membrane, ear canal and external ear normal.      Left Ear: Tympanic membrane, ear canal and external ear normal.      Nose: Nose normal.      Mouth/Throat:      Mouth: Mucous membranes are moist.      Pharynx: Oropharynx is clear.   Eyes:      Extraocular Movements: Extraocular movements intact.      Conjunctiva/sclera: Conjunctivae normal.      Pupils: Pupils are equal, round, and reactive to light.   Cardiovascular:      Rate and Rhythm: Normal rate and regular rhythm.      Pulses: Normal pulses.      Heart sounds: Normal heart sounds.   Pulmonary:      Effort: Pulmonary effort is normal.      Breath sounds: Normal " breath sounds.   Abdominal:      General: Abdomen is flat. Bowel sounds are normal.      Palpations: Abdomen is soft.   Musculoskeletal:         General: Normal range of motion.      Cervical back: Normal range of motion and neck supple.   Skin:     General: Skin is warm and dry.      Capillary Refill: Capillary refill takes less than 2 seconds.   Neurological:      General: No focal deficit present.      Mental Status: She is alert and oriented to person, place, and time. Mental status is at baseline.   Psychiatric:         Mood and Affect: Mood normal.         Behavior: Behavior normal.         Thought Content: Thought content normal.         Judgment: Judgment normal.          Labs / Imaging     Chemistry:  Lab Results   Component Value Date     05/24/2024    K 4.5 05/24/2024    BUN 14.4 05/24/2024    CREATININE 0.67 05/24/2024    EGFRNORACEVR >60 05/24/2024    GLUCOSE 105 05/24/2024    CALCIUM 9.6 05/24/2024    ALKPHOS 109 05/24/2024    LABPROT 7.3 05/24/2024    ALBUMIN 3.6 05/24/2024    BILIDIR 0.2 03/10/2022    IBILI 0.20 03/10/2022    AST 26 05/24/2024    ALT 32 05/24/2024    MG 1.70 10/04/2023    JWELIOXK40KN 45 05/24/2024        Lab Results   Component Value Date    HGBA1C 5.4 01/12/2024        Hematology:  Lab Results   Component Value Date    WBC 8.18 05/24/2024    RBC 4.73 05/24/2024    HGB 15.4 05/24/2024    HCT 46.4 05/24/2024    MCV 98.1 (H) 05/24/2024    MCH 32.6 (H) 05/24/2024    MCHC 33.2 05/24/2024    RDW 13.0 05/24/2024     05/24/2024    MPV 12.4 (H) 05/24/2024        Lipid Panel:  Lab Results   Component Value Date    CHOL 179 05/24/2024    HDL 43 05/24/2024    .00 05/24/2024    TRIG 120 05/24/2024    TOTALCHOLEST 4 05/24/2024        Urine:  Lab Results   Component Value Date    APPEARANCEUA Clear 10/04/2023    SGUA 1.025 10/04/2023    PROTEINUA Negative 10/04/2023    KETONESUA Negative 10/04/2023    LEUKOCYTESUR Negative 10/04/2023    RBCUA 0-2 10/04/2023    WBCUA 0-5  10/04/2023    BACTERIA Rare 10/04/2023    SQEPUA Occ (A) 07/13/2023    HYALINECASTS None Seen 07/13/2023          Assessment       ICD-10-CM ICD-9-CM   1. Urinary tract infection without hematuria, site unspecified  N39.0 599.0   2. DIANA on CPAP  G47.33 327.23   3. Chronic obstructive pulmonary disease, unspecified COPD type  J44.9 496   4. Cigarette nicotine dependence without complication [F17.210]  F17.210 305.1   5. Hyperlipidemia, unspecified hyperlipidemia type  E78.5 272.4   6. BMI 39.0-39.9,adult  Z68.39 V85.39        Plan     1. Urinary tract infection without hematuria, site unspecified  UA today, start on CIPRO, will notify if need to adjust med  Push water and avoid caffeine, alcohol, and avoid dark liquids  Avoid scented hygiene products  Void after intercourse  Wipe front to back  No douching  Probiotics   - Urinalysis, Reflex to Urine Culture; Future    2. DIANA on CPAP  Pt is compliant with CPAP use, benefiting from use, and needs to continued use.     3. Chronic obstructive pulmonary disease, unspecified COPD type  Stable   Use inhalers as prescribed (rinse mouth after use of steroid inhalers).    Use long term inhalers daily and rescue inhaler as needed.    Avoid triggers (high humidity, strong odors, chemical fumes).    Report signs of upper respiratory infection as soon as possible for early treatment.    Practice/encouraged abdominal breathing.   Eat smaller, more frequent meals.   Flu shot recommended yearly.    Smoking cessation encouraged     4. Cigarette nicotine dependence without complication [F17.210]  Failed smok cessation on patches, asking to try wellbutrin. Decrease effexor to 75 daily.  Smoking cessation advised. Instructed on smoking cessation program through [facility] and pharmacological interventions to aid in cessation.  >5 minutes allotted to educate patient on the harms of smoking, the urgency to quit, and the development of a plan for smoking cessation.     5. Hyperlipidemia,  unspecified hyperlipidemia type  Lab Results   Component Value Date    .00 05/24/2024    CHOL 179 05/24/2024    HDL 43 05/24/2024    TRIG 120 05/24/2024     FLP at f/u  Cont RX daily; refills given. Take Omega 3 daily.   Stressed importance of dietary modifications. Follow a low cholesterol, low saturated fat diet with less that 200mg of cholesterol a day.  Avoid fried foods and high saturated fats (high saturated fats less than 7% of calories).  Add Flax Seed/Fish Oil supplements to diet. Increase dietary fiber.  Regular exercise can reduce LDL and raise HDL. Stressed importance of physical activity 5 times per week for 30 minutes per day.      6. BMI 39.0-39.9,adult  Goal BMI <30.  Exercise 5 times a week for 30 minutes per day.  Avoid soda, simple sugars, excessive rice, potatoes or bread. Limit fast foods and fried foods.  Choose complex carbs in moderation (example: green vegetables, beans, oatmeal). Eat plenty of fresh fruits and vegetables with lean meats daily.  Do not skip meals. Eat a balanced portion size.  Avoid fad diets. Consider permanent healthy life style changes.         Current Outpatient Medications   Medication Instructions    albuterol (VENTOLIN HFA) 90 mcg/actuation inhaler 2 puffs, Inhalation, Every 6 hours PRN, Rescue    amLODIPine (NORVASC) 2.5 mg, Nightly    atorvastatin (LIPITOR) 20 mg, Oral, Nightly    buPROPion (WELLBUTRIN SR) 150 mg, Oral, 2 times daily, Smoking cessation    ciprofloxacin HCl (CIPRO) 500 mg, Oral, 2 times daily    diclofenac (VOLTAREN) 75 mg, Oral, 2 times daily PRN, Do not take this with Advil, Ibuprofen, Motrin, Asprin, or Toradol.    hydrOXYzine pamoate (VISTARIL) 25 mg, Oral, Nightly PRN    ibuprofen (ADVIL,MOTRIN) 800 mg, Every 6 hours PRN    mv-mn/iron/folic acid/herb 190 (VITAMIN D3 COMPLETE ORAL)   Daily, 0 Refill(s)    venlafaxine (EFFEXOR-XR) 75 mg, Oral, Daily       Orders Placed This Encounter   Procedures    Urinalysis, Reflex to Urine Culture          Future Appointments   Date Time Provider Department Center   7/23/2025  8:30 AM Bryanna Torres FNP [facility] GYN [facility]        Follow up in about 3 months (around 3/2/2025) for fasting labs.    Medication refills addressed today.  RTC prn and 3 months, with labs 1 week prior to the apt.  COVID 19 precautions given to patient.  Patient voices understanding of all discharge instructions.      TRACEY Brown

## 2024-12-04 ENCOUNTER — OFFICE VISIT (OUTPATIENT)
Dept: URGENT CARE | Facility: CLINIC | Age: 64
End: 2024-12-04
Payer: MEDICARE

## 2024-12-04 VITALS
DIASTOLIC BLOOD PRESSURE: 81 MMHG | HEART RATE: 83 BPM | HEIGHT: 59 IN | OXYGEN SATURATION: 97 % | TEMPERATURE: 99 F | WEIGHT: 198.13 LBS | RESPIRATION RATE: 20 BRPM | BODY MASS INDEX: 39.94 KG/M2 | SYSTOLIC BLOOD PRESSURE: 144 MMHG

## 2024-12-04 DIAGNOSIS — J30.9 ALLERGIC RHINITIS, UNSPECIFIED SEASONALITY, UNSPECIFIED TRIGGER: ICD-10-CM

## 2024-12-04 DIAGNOSIS — R05.9 COUGH, UNSPECIFIED TYPE: ICD-10-CM

## 2024-12-04 DIAGNOSIS — J01.90 ACUTE NON-RECURRENT SINUSITIS, UNSPECIFIED LOCATION: Primary | ICD-10-CM

## 2024-12-04 DIAGNOSIS — R09.89 SYMPTOMS OF UPPER RESPIRATORY INFECTION (URI): ICD-10-CM

## 2024-12-04 LAB
CTP QC/QA: YES
CTP QC/QA: YES
POC MOLECULAR INFLUENZA A AGN: NEGATIVE
POC MOLECULAR INFLUENZA B AGN: NEGATIVE
SARS-COV-2 AG RESP QL IA.RAPID: NEGATIVE

## 2024-12-04 PROCEDURE — 99215 OFFICE O/P EST HI 40 MIN: CPT | Mod: PBBFAC | Performed by: NURSE PRACTITIONER

## 2024-12-04 PROCEDURE — 99214 OFFICE O/P EST MOD 30 MIN: CPT | Mod: S$PBB,,, | Performed by: NURSE PRACTITIONER

## 2024-12-04 PROCEDURE — 87502 INFLUENZA DNA AMP PROBE: CPT | Mod: PBBFAC | Performed by: NURSE PRACTITIONER

## 2024-12-04 PROCEDURE — 87811 SARS-COV-2 COVID19 W/OPTIC: CPT | Mod: PBBFAC | Performed by: NURSE PRACTITIONER

## 2024-12-04 RX ORDER — FLUTICASONE PROPIONATE 50 MCG
1 SPRAY, SUSPENSION (ML) NASAL DAILY
Qty: 16 G | Refills: 0 | Status: SHIPPED | OUTPATIENT
Start: 2024-12-04 | End: 2024-12-11

## 2024-12-04 RX ORDER — PROMETHAZINE HYDROCHLORIDE AND DEXTROMETHORPHAN HYDROBROMIDE 6.25; 15 MG/5ML; MG/5ML
5 SYRUP ORAL EVERY 4 HOURS PRN
Qty: 118 ML | Refills: 0 | Status: SHIPPED | OUTPATIENT
Start: 2024-12-04 | End: 2024-12-11

## 2024-12-04 RX ORDER — METHYLPREDNISOLONE 4 MG/1
TABLET ORAL
Qty: 21 TABLET | Refills: 0 | Status: SHIPPED | OUTPATIENT
Start: 2024-12-04 | End: 2024-12-10

## 2024-12-04 RX ORDER — AZITHROMYCIN 250 MG/1
TABLET, FILM COATED ORAL
Qty: 6 TABLET | Refills: 0 | Status: SHIPPED | OUTPATIENT
Start: 2024-12-04 | End: 2024-12-09

## 2024-12-04 NOTE — PATIENT INSTRUCTIONS
Medication as ordered. May use humidifier.  If any shortness of breath, wheezing, continued fevers or any new symptoms then immediately go to ER.

## 2024-12-04 NOTE — PROGRESS NOTES
"Subjective:       Patient ID: Twila Felix is a 64 y.o. female.    Vitals:  height is 4' 11" (1.499 m) and weight is 89.9 kg (198 lb 1.6 oz). Her temperature is 98.8 °F (37.1 °C). Her blood pressure is 144/81 (abnormal) and her pulse is 83. Her respiration is 20 and oxygen saturation is 97%.     Chief Complaint: URI (Cough, chills since this AM. States exposure to COVID.)    Patient states she does have sinus issues often, takes Claritin daily.        Constitution: Negative.   HENT:  Positive for sinus pain.    Neck: neck negative.   Cardiovascular: Negative.    Respiratory:  Positive for cough.    Allergic/Immunologic: Positive for seasonal allergies.   Neurological: Negative.        Objective:      Physical Exam   Constitutional: She is oriented to person, place, and time. She appears well-developed.   HENT:   Head: Normocephalic.   Ears:   Right Ear: Tympanic membrane normal.   Left Ear: Tympanic membrane normal.   Nose: Rhinorrhea present. Right sinus exhibits frontal sinus tenderness. Left sinus exhibits frontal sinus tenderness.   Eyes: Conjunctivae and EOM are normal.   Neck: Neck supple.   Cardiovascular: Normal rate, regular rhythm and normal heart sounds.   Pulmonary/Chest: Effort normal and breath sounds normal.   Abdominal: Bowel sounds are normal. Soft.   Musculoskeletal: Normal range of motion.         General: Normal range of motion.   Neurological: She is alert and oriented to person, place, and time.   Skin: Skin is warm and dry. Capillary refill takes less than 2 seconds.   Psychiatric: Her behavior is normal.   Vitals reviewed.        Assessment:       1. Acute non-recurrent sinusitis, unspecified location    2. Symptoms of upper respiratory infection (URI)    3. Cough, unspecified type    4. Allergic rhinitis, unspecified seasonality, unspecified trigger            Office Visit on 12/04/2024   Component Date Value Ref Range Status    POC Molecular Influenza A Ag 12/04/2024 Negative  " Negative Final    POC Molecular Influenza B Ag 12/04/2024 Negative  Negative Final     Acceptable 12/04/2024 Yes   Final    SARS Coronavirus 2 Antigen 12/04/2024 Negative  Negative Final     Acceptable 12/04/2024 Yes   Final        No results found.   Plan:         Acute non-recurrent sinusitis, unspecified location  -     fluticasone propionate (FLONASE) 50 mcg/actuation nasal spray; 1 spray (50 mcg total) by Each Nostril route once daily. for 7 days  Dispense: 16 g; Refill: 0  -     azithromycin (Z-GILLIAN) 250 MG tablet; Take 2 tablets by mouth on day 1; Take 1 tablet by mouth on days 2-5  Dispense: 6 tablet; Refill: 0  -     methylPREDNISolone (MEDROL DOSEPACK) 4 mg tablet; Take 6 tablets (24 mg total) by mouth once daily for 1 day, THEN 5 tablets (20 mg total) once daily for 1 day, THEN 4 tablets (16 mg total) once daily for 1 day, THEN 3 tablets (12 mg total) once daily for 1 day, THEN 2 tablets (8 mg total) once daily for 1 day, THEN 1 tablet (4 mg total) once daily for 1 day. use as directed.  Dispense: 21 tablet; Refill: 0    Symptoms of upper respiratory infection (URI)  -     POCT Influenza A/B Molecular  -     SARS Coronavirus 2 Antigen, POCT Manual Read    Cough, unspecified type  -     promethazine-dextromethorphan (PROMETHAZINE-DM) 6.25-15 mg/5 mL Syrp; Take 5 mLs by mouth every 4 (four) hours as needed (cough).  Dispense: 118 mL; Refill: 0  -     methylPREDNISolone (MEDROL DOSEPACK) 4 mg tablet; Take 6 tablets (24 mg total) by mouth once daily for 1 day, THEN 5 tablets (20 mg total) once daily for 1 day, THEN 4 tablets (16 mg total) once daily for 1 day, THEN 3 tablets (12 mg total) once daily for 1 day, THEN 2 tablets (8 mg total) once daily for 1 day, THEN 1 tablet (4 mg total) once daily for 1 day. use as directed.  Dispense: 21 tablet; Refill: 0    Allergic rhinitis, unspecified seasonality, unspecified trigger  -     fluticasone propionate (FLONASE) 50 mcg/actuation  nasal spray; 1 spray (50 mcg total) by Each Nostril route once daily. for 7 days  Dispense: 16 g; Refill: 0  -     methylPREDNISolone (MEDROL DOSEPACK) 4 mg tablet; Take 6 tablets (24 mg total) by mouth once daily for 1 day, THEN 5 tablets (20 mg total) once daily for 1 day, THEN 4 tablets (16 mg total) once daily for 1 day, THEN 3 tablets (12 mg total) once daily for 1 day, THEN 2 tablets (8 mg total) once daily for 1 day, THEN 1 tablet (4 mg total) once daily for 1 day. use as directed.  Dispense: 21 tablet; Refill: 0

## 2024-12-30 NOTE — TELEPHONE ENCOUNTER
Spoke with patient informed her that her PCP Ms Luis is out of office at this time and  if conditions worsens seek [facility] [place] for evaluation and treatment.Patient is requesting L-Spine xray results,I  informed her Iwill have another provider view results and she will be notified,acknowledged understanding.   [Chaperone Declined] : Patient declined chaperone [Appropriately responsive] : appropriately responsive [Alert] : alert [No Acute Distress] : no acute distress [No Lymphadenopathy] : no lymphadenopathy [Soft] : soft [Non-tender] : non-tender [Non-distended] : non-distended [No HSM] : No HSM [No Lesions] : no lesions [No Mass] : no mass [Oriented x3] : oriented x3 [Examination Of The Breasts] : a normal appearance [No Masses] : no breast masses were palpable [Labia Majora] : normal [Labia Minora] : normal [Normal] : normal [Tenderness] : nontender [Uterine Adnexae] : normal

## 2025-02-24 ENCOUNTER — HOSPITAL ENCOUNTER (OUTPATIENT)
Dept: RADIOLOGY | Facility: HOSPITAL | Age: 65
Discharge: HOME | End: 2025-02-24
Attending: NURSE PRACTITIONER
Payer: MEDICARE

## 2025-02-24 DIAGNOSIS — Z12.31 SCREENING MAMMOGRAM FOR BREAST CANCER: ICD-10-CM

## 2025-02-24 PROCEDURE — 77067 SCR MAMMO BI INCL CAD: CPT | Mod: 26,,, | Performed by: RADIOLOGY

## 2025-02-24 PROCEDURE — 77063 BREAST TOMOSYNTHESIS BI: CPT | Mod: TC

## 2025-02-24 PROCEDURE — 77063 BREAST TOMOSYNTHESIS BI: CPT | Mod: 26,,, | Performed by: RADIOLOGY

## 2025-03-10 ENCOUNTER — LAB VISIT (OUTPATIENT)
Dept: LAB | Facility: HOSPITAL | Age: 65
End: 2025-03-10
Attending: NURSE PRACTITIONER
Payer: MEDICARE

## 2025-03-10 DIAGNOSIS — Z13.29 THYROID DISORDER SCREEN: ICD-10-CM

## 2025-03-10 DIAGNOSIS — F32.A DEPRESSION, UNSPECIFIED DEPRESSION TYPE: ICD-10-CM

## 2025-03-10 DIAGNOSIS — E55.9 VITAMIN D DEFICIENCY: ICD-10-CM

## 2025-03-10 DIAGNOSIS — Z11.4 ENCOUNTER FOR SCREENING FOR HIV: ICD-10-CM

## 2025-03-10 DIAGNOSIS — E78.5 HYPERLIPIDEMIA, UNSPECIFIED HYPERLIPIDEMIA TYPE: ICD-10-CM

## 2025-03-10 LAB
25(OH)D3+25(OH)D2 SERPL-MCNC: 37 NG/ML (ref 30–80)
ALBUMIN SERPL-MCNC: 3.5 G/DL (ref 3.4–4.8)
ALBUMIN/GLOB SERPL: 1 RATIO (ref 1.1–2)
ALP SERPL-CCNC: 99 UNIT/L (ref 40–150)
ALT SERPL-CCNC: 28 UNIT/L (ref 0–55)
ANION GAP SERPL CALC-SCNC: 6 MEQ/L
AST SERPL-CCNC: 22 UNIT/L (ref 5–34)
BASOPHILS # BLD AUTO: 0.04 X10(3)/MCL
BASOPHILS NFR BLD AUTO: 0.7 %
BILIRUB SERPL-MCNC: 0.4 MG/DL
BUN SERPL-MCNC: 15.6 MG/DL (ref 9.8–20.1)
CALCIUM SERPL-MCNC: 9.5 MG/DL (ref 8.4–10.2)
CHLORIDE SERPL-SCNC: 108 MMOL/L (ref 98–107)
CHOLEST SERPL-MCNC: 163 MG/DL
CHOLEST/HDLC SERPL: 5 {RATIO} (ref 0–5)
CO2 SERPL-SCNC: 27 MMOL/L (ref 23–31)
CREAT SERPL-MCNC: 0.61 MG/DL (ref 0.55–1.02)
CREAT/UREA NIT SERPL: 26
EOSINOPHIL # BLD AUTO: 0.27 X10(3)/MCL (ref 0–0.9)
EOSINOPHIL NFR BLD AUTO: 4.7 %
ERYTHROCYTE [DISTWIDTH] IN BLOOD BY AUTOMATED COUNT: 13.4 % (ref 11.5–17)
GFR SERPLBLD CREATININE-BSD FMLA CKD-EPI: >60 ML/MIN/1.73/M2
GLOBULIN SER-MCNC: 3.6 GM/DL (ref 2.4–3.5)
GLUCOSE SERPL-MCNC: 88 MG/DL (ref 82–115)
HCT VFR BLD AUTO: 44.8 % (ref 37–47)
HDLC SERPL-MCNC: 36 MG/DL (ref 35–60)
HGB BLD-MCNC: 14.2 G/DL (ref 12–16)
IMM GRANULOCYTES # BLD AUTO: 0.02 X10(3)/MCL (ref 0–0.04)
IMM GRANULOCYTES NFR BLD AUTO: 0.3 %
LDLC SERPL CALC-MCNC: 106 MG/DL (ref 50–140)
LYMPHOCYTES # BLD AUTO: 1.38 X10(3)/MCL (ref 0.6–4.6)
LYMPHOCYTES NFR BLD AUTO: 24 %
MCH RBC QN AUTO: 31 PG (ref 27–31)
MCHC RBC AUTO-ENTMCNC: 31.7 G/DL (ref 33–36)
MCV RBC AUTO: 97.8 FL (ref 80–94)
MONOCYTES # BLD AUTO: 0.62 X10(3)/MCL (ref 0.1–1.3)
MONOCYTES NFR BLD AUTO: 10.8 %
NEUTROPHILS # BLD AUTO: 3.43 X10(3)/MCL (ref 2.1–9.2)
NEUTROPHILS NFR BLD AUTO: 59.5 %
NRBC BLD AUTO-RTO: 0 %
PLATELET # BLD AUTO: 170 X10(3)/MCL (ref 130–400)
PMV BLD AUTO: 12.6 FL (ref 7.4–10.4)
POTASSIUM SERPL-SCNC: 4.5 MMOL/L (ref 3.5–5.1)
PROT SERPL-MCNC: 7.1 GM/DL (ref 5.8–7.6)
RBC # BLD AUTO: 4.58 X10(6)/MCL (ref 4.2–5.4)
SODIUM SERPL-SCNC: 141 MMOL/L (ref 136–145)
TRIGL SERPL-MCNC: 103 MG/DL (ref 37–140)
TSH SERPL-ACNC: 2.88 UIU/ML (ref 0.35–4.94)
VLDLC SERPL CALC-MCNC: 21 MG/DL
WBC # BLD AUTO: 5.76 X10(3)/MCL (ref 4.5–11.5)

## 2025-03-10 PROCEDURE — 85025 COMPLETE CBC W/AUTO DIFF WBC: CPT

## 2025-03-10 PROCEDURE — 80053 COMPREHEN METABOLIC PANEL: CPT

## 2025-03-10 PROCEDURE — 82306 VITAMIN D 25 HYDROXY: CPT

## 2025-03-10 PROCEDURE — 84443 ASSAY THYROID STIM HORMONE: CPT

## 2025-03-10 PROCEDURE — 36415 COLL VENOUS BLD VENIPUNCTURE: CPT

## 2025-03-10 PROCEDURE — 80061 LIPID PANEL: CPT

## 2025-03-19 ENCOUNTER — RESULTS FOLLOW-UP (OUTPATIENT)
Dept: INTERNAL MEDICINE | Facility: CLINIC | Age: 65
End: 2025-03-19

## 2025-03-19 ENCOUNTER — LAB VISIT (OUTPATIENT)
Dept: LAB | Facility: HOSPITAL | Age: 65
End: 2025-03-19
Attending: NURSE PRACTITIONER
Payer: MEDICARE

## 2025-03-19 ENCOUNTER — OFFICE VISIT (OUTPATIENT)
Dept: INTERNAL MEDICINE | Facility: CLINIC | Age: 65
End: 2025-03-19
Payer: MEDICARE

## 2025-03-19 VITALS
BODY MASS INDEX: 39.19 KG/M2 | HEART RATE: 74 BPM | RESPIRATION RATE: 18 BRPM | HEIGHT: 59 IN | OXYGEN SATURATION: 99 % | WEIGHT: 194.38 LBS | SYSTOLIC BLOOD PRESSURE: 140 MMHG | DIASTOLIC BLOOD PRESSURE: 82 MMHG | TEMPERATURE: 98 F

## 2025-03-19 DIAGNOSIS — E78.5 HYPERLIPIDEMIA, UNSPECIFIED HYPERLIPIDEMIA TYPE: ICD-10-CM

## 2025-03-19 DIAGNOSIS — R53.83 FATIGUE, UNSPECIFIED TYPE: ICD-10-CM

## 2025-03-19 DIAGNOSIS — E55.9 VITAMIN D DEFICIENCY: ICD-10-CM

## 2025-03-19 DIAGNOSIS — J44.9 CHRONIC OBSTRUCTIVE PULMONARY DISEASE, UNSPECIFIED COPD TYPE: ICD-10-CM

## 2025-03-19 DIAGNOSIS — F17.210 CIGARETTE NICOTINE DEPENDENCE WITHOUT COMPLICATION: ICD-10-CM

## 2025-03-19 DIAGNOSIS — I10 HYPERTENSION, UNSPECIFIED TYPE: ICD-10-CM

## 2025-03-19 LAB — CORTIS SERPL-SCNC: 17.9 UG/DL

## 2025-03-19 PROCEDURE — 82533 TOTAL CORTISOL: CPT

## 2025-03-19 PROCEDURE — 99214 OFFICE O/P EST MOD 30 MIN: CPT | Mod: PBBFAC | Performed by: NURSE PRACTITIONER

## 2025-03-19 PROCEDURE — 36415 COLL VENOUS BLD VENIPUNCTURE: CPT

## 2025-03-19 RX ORDER — AMLODIPINE BESYLATE 5 MG/1
5 TABLET ORAL NIGHTLY
Qty: 90 TABLET | Refills: 1 | Status: SHIPPED | OUTPATIENT
Start: 2025-03-19

## 2025-03-19 RX ORDER — HYDROXYZINE PAMOATE 25 MG/1
25 CAPSULE ORAL NIGHTLY PRN
Qty: 30 CAPSULE | Refills: 4 | Status: SHIPPED | OUTPATIENT
Start: 2025-03-19

## 2025-03-19 RX ORDER — VENLAFAXINE HYDROCHLORIDE 75 MG/1
75 CAPSULE, EXTENDED RELEASE ORAL DAILY
Qty: 90 CAPSULE | Refills: 1 | Status: SHIPPED | OUTPATIENT
Start: 2025-03-19

## 2025-03-19 RX ORDER — ATORVASTATIN CALCIUM 20 MG/1
20 TABLET, FILM COATED ORAL NIGHTLY
Qty: 90 TABLET | Refills: 1 | Status: SHIPPED | OUTPATIENT
Start: 2025-03-19

## 2025-03-19 RX ORDER — BUPROPION HYDROCHLORIDE 300 MG/1
300 TABLET ORAL DAILY
Qty: 30 TABLET | Refills: 6 | Status: SHIPPED | OUTPATIENT
Start: 2025-03-19 | End: 2026-03-19

## 2025-03-19 NOTE — PROGRESS NOTES
[facility]  TRACEY Brown     Patient Name: Twila Felix   : 1960  MRN:[id number]     Chief Complaint     Chief Complaint   Patient presents with    Follow-up    Labs Only     3 month f/u for lab review         History of Present Illness     64 year old white female, presents in clinic for lab review, asking to have her cortisol drawn due to history of fatigue, round face and increase abd girth. BP elevated today on amlodipine 2.5 qhs.     PMH HLD, HTN, PVD, DIANA on CPAP, COPD, tobacco user 1ppd, depression, chronic right knee pain. Mood stable.Taking Effexor 75 mg and Wellbutrin daily. Was tried on hctz and losartan in the past but stopped due to it dropping her too low. Lisinopril caused angioedema. Enrolled in smoking cessation classes/medications. Following Dr. Hannah for peripheral vascular reflux, left leg venous procedure on 2023. Pt is using CPAP nightly, benefiting from use and would like to continue use of CPAP. Sleep study 3 yrs prior.     Denies chest pain, shortness of breath, cough, fever, headache, dizziness, weakness, abdominal pain, nausea, vomiting, diarrhea, constipation, dysuria, SI, HI, anxiety.     MMG 2025; BIRADS 1; sister of pt recently diagnosed breast cancer.     Following [facility]  Cologuard 10/2022 positive, colonoscopy at [facility] on 2023; adenoma polyps, repeat in 5 yrs ().  LDCT 24: Lung-RADS Category:  2 - Benign Appearance or Behavior - continue annual screening with LDCT in 12 months.There are changes seen consistent with COPD in the lungs bilaterally  PFT 2023: moderate restriction, no significant bronchodilator response.  PFT 24: flow rates and lung volumes are normal. Reduced DLCO is likely due to loss of alveolar surface area for gas exchange.               Review of Systems     Review of Systems   Constitutional:  Positive for fatigue.   HENT: Negative.     Eyes: Negative.    Respiratory: Negative.     Cardiovascular:  "Negative.    Gastrointestinal: Negative.    Endocrine: Negative.    Genitourinary: Negative.    Musculoskeletal: Negative.    Integumentary:  Negative.   Allergic/Immunologic: Negative.    Neurological: Negative.    Hematological: Negative.    Psychiatric/Behavioral: Negative.     All other systems reviewed and are negative.       Physical Examination     Visit Vitals  BP (!) 140/82   Pulse 74   Temp 97.5 °F (36.4 °C) (Oral)   Resp 18   Ht 4' 11" (1.499 m)   Wt 88.2 kg (194 lb 6.4 oz)   SpO2 99%   BMI 39.26 kg/m²        BP Readings from Last 6 Encounters:   03/19/25 (!) 140/82   12/04/24 (!) 144/81   12/02/24 138/87   09/18/24 131/84   08/24/24 132/86   08/12/24 110/74   ]    Wt Readings from Last 6 Encounters:   03/19/25 88.2 kg (194 lb 6.4 oz)   12/04/24 89.9 kg (198 lb 1.6 oz)   12/02/24 89.8 kg (198 lb)   09/18/24 88.7 kg (195 lb 9.6 oz)   08/24/24 87.5 kg (192 lb 14.4 oz)   08/12/24 87.5 kg (193 lb)   ]    BMI Readings from Last 3 Encounters:   03/19/25 39.26 kg/m²   12/04/24 40.01 kg/m²   12/02/24 39.99 kg/m²         Physical Exam  Vitals and nursing note reviewed.   Constitutional:       Appearance: Normal appearance.   HENT:      Head: Normocephalic and atraumatic.      Right Ear: Tympanic membrane, ear canal and external ear normal.      Left Ear: Tympanic membrane, ear canal and external ear normal.      Nose: Nose normal.      Mouth/Throat:      Mouth: Mucous membranes are moist.      Pharynx: Oropharynx is clear.   Eyes:      Extraocular Movements: Extraocular movements intact.      Conjunctiva/sclera: Conjunctivae normal.      Pupils: Pupils are equal, round, and reactive to light.   Cardiovascular:      Rate and Rhythm: Normal rate and regular rhythm.      Pulses: Normal pulses.      Heart sounds: Normal heart sounds.   Pulmonary:      Effort: Pulmonary effort is normal.      Breath sounds: Normal breath sounds.   Abdominal:      General: Abdomen is flat. Bowel sounds are normal.      Palpations: " Abdomen is soft.   Musculoskeletal:         General: Normal range of motion.      Cervical back: Normal range of motion and neck supple.   Skin:     General: Skin is warm and dry.      Capillary Refill: Capillary refill takes less than 2 seconds.   Neurological:      General: No focal deficit present.      Mental Status: She is alert and oriented to person, place, and time. Mental status is at baseline.   Psychiatric:         Mood and Affect: Mood normal.         Behavior: Behavior normal.         Thought Content: Thought content normal.         Judgment: Judgment normal.          Labs / Imaging     Chemistry:  Lab Results   Component Value Date     03/10/2025    K 4.5 03/10/2025    BUN 15.6 03/10/2025    CREATININE 0.61 03/10/2025    EGFRNORACEVR >60 03/10/2025    GLUCOSE 88 03/10/2025    CALCIUM 9.5 03/10/2025    ALKPHOS 99 03/10/2025    LABPROT 7.1 03/10/2025    ALBUMIN 3.5 03/10/2025    BILIDIR 0.2 03/10/2022    IBILI 0.20 03/10/2022    AST 22 03/10/2025    ALT 28 03/10/2025    MG 1.70 10/04/2023    BYNNAPTV21VR 37 03/10/2025        Lab Results   Component Value Date    HGBA1C 5.4 01/12/2024        Hematology:  Lab Results   Component Value Date    WBC 5.76 03/10/2025    RBC 4.58 03/10/2025    HGB 14.2 03/10/2025    HCT 44.8 03/10/2025    MCV 97.8 (H) 03/10/2025    MCH 31.0 03/10/2025    MCHC 31.7 (L) 03/10/2025    RDW 13.4 03/10/2025     03/10/2025    MPV 12.6 (H) 03/10/2025        Lipid Panel:  Lab Results   Component Value Date    CHOL 163 03/10/2025    HDL 36 03/10/2025    .00 03/10/2025    TRIG 103 03/10/2025    TOTALCHOLEST 5 03/10/2025        Urine:  Lab Results   Component Value Date    APPEARANCEUA Clear 12/02/2024    SGUA 1.009 12/02/2024    PROTEINUA Negative 12/02/2024    KETONESUA Negative 12/02/2024    LEUKOCYTESUR Negative 12/02/2024    RBCUA 0-5 12/02/2024    WBCUA 0-5 12/02/2024    BACTERIA Occasional (A) 12/02/2024    SQEPUA Moderate (A) 12/02/2024    HYALINECASTS 0-2 (A)  12/02/2024          Assessment       ICD-10-CM ICD-9-CM   1. Hypertension, unspecified type  I10 401.9   2. Hyperlipidemia, unspecified hyperlipidemia type  E78.5 272.4   3. Chronic obstructive pulmonary disease, unspecified COPD type  J44.9 496   4. Cigarette nicotine dependence without complication [F17.210]  F17.210 305.1   5. BMI 39.0-39.9,adult  Z68.39 V85.39   6. Fatigue, unspecified type  R53.83 780.79   7. Vitamin D deficiency  E55.9 268.9        Plan     1. Hypertension, unspecified type  BP elevated, increase amlodipine to 5mg qhs. BP check in 2 wks with IM nurse.  HR stable. Med refills. DASH diet: Eat more fruits, vegetables, and low fat dairy foods.  (Less than 2 grams of sodium per day).  Maintain healthy weight with goal BMI <30.   Exercise 30 minutes per day 5 days per week.  Home medications refilled and continued.   Home BP monitoring encouraged with BP parameters given.      2. Hyperlipidemia, unspecified hyperlipidemia type  Lab Results   Component Value Date    .00 03/10/2025    CHOL 163 03/10/2025    HDL 36 03/10/2025    TRIG 103 03/10/2025       Cont RX daily; refills given. Take OTC Fish oil/[place] 3/DHA EPA daily.   Stressed importance of dietary modifications. Follow a low cholesterol, low saturated fat diet with less that 200mg of cholesterol a day.  Avoid fried foods and high saturated fats (high saturated fats less than 7% of calories).  Add Flax Seed/Fish Oil supplements to diet. Increase dietary fiber.  Regular exercise can reduce LDL and raise HDL. Stressed importance of physical activity 5 times per week for 30 minutes per day.      3. Chronic obstructive pulmonary disease, unspecified COPD type    Use inhalers as prescribed (rinse mouth after use of steroid inhalers).    Use long term inhalers daily and rescue inhaler as needed.    Avoid triggers (high humidity, strong odors, chemical fumes).    Report signs of upper respiratory infection as soon as possible for early  treatment.    Practice/encouraged abdominal breathing.   Eat smaller, more frequent meals.   Flu shot recommended yearly.    Smoking cessation encouraged     4. Cigarette nicotine dependence without complication [F17.210]  Smoking cessation advised. Instructed on smoking cessation program through [facility] and pharmacological interventions to aid in cessation.  >5 minutes allotted to educate patient on the harms of smoking, the urgency to quit, and the development of a plan for smoking cessation.     5. BMI 39.0-39.9,adult  Goal BMI <30.  Exercise 5 times a week for 30 minutes per day.  Avoid soda, simple sugars, excessive rice, potatoes or bread. Limit fast foods and fried foods.  Choose complex carbs in moderation (example: green vegetables, beans, oatmeal). Eat plenty of fresh fruits and vegetables with lean meats daily.  Do not skip meals. Eat a balanced portion size.  Avoid fad diets. Consider permanent healthy life style changes.       6. Fatigue, unspecified type (Primary)    - Cortisol; Future      Current Outpatient Medications   Medication Instructions    albuterol (VENTOLIN HFA) 90 mcg/actuation inhaler 2 puffs, Inhalation, Every 6 hours PRN, Rescue    amLODIPine (NORVASC) 5 mg, Oral, Nightly    atorvastatin (LIPITOR) 20 mg, Oral, Nightly    buPROPion (WELLBUTRIN XL) 300 mg, Oral, Daily    diclofenac (VOLTAREN) 75 mg, Oral, 2 times daily PRN, Do not take this with Advil, Ibuprofen, Motrin, Asprin, or Toradol.    hydrOXYzine pamoate (VISTARIL) 25 mg, Oral, Nightly PRN    ibuprofen (ADVIL,MOTRIN) 800 mg, Every 6 hours PRN    mv-mn/iron/folic acid/herb 190 (VITAMIN D3 COMPLETE ORAL)   Daily, 0 Refill(s)    venlafaxine (EFFEXOR-XR) 75 mg, Oral, Daily       Orders Placed This Encounter   Procedures    Cortisol    Urinalysis, Reflex to Urine Culture    Vitamin D    Lipid Panel    Comprehensive Metabolic Panel    CBC Auto Differential         Future Appointments   Date Time Provider Department Center   4/2/2025  9:30  AM NURSE, [facility] INTERNAL MEDICINE [facility]   7/23/2025  8:30 AM Bryanna Torres FNP [facility] GYN [facility]   9/22/2025  9:40 AM Swapna Luis FNP [facility]        Follow up in about 6 months (around 9/19/2025) for fasting labs.    Labs thoroughly reviewed with patient. Medication refills addressed today.  RTC prn and 6 months, with labs 1 week prior to the apt.  COVID 19 precautions given to patient.  Patient voices understanding of all discharge instructions.      TRACEY Brown

## 2025-03-20 ENCOUNTER — TELEPHONE (OUTPATIENT)
Dept: INTERNAL MEDICINE | Facility: CLINIC | Age: 65
End: 2025-03-20
Payer: MEDICARE

## 2025-03-20 NOTE — TELEPHONE ENCOUNTER
----- Message from TRACEY Cisneros sent at 3/19/2025  3:45 PM CDT -----  Please inform pt that her cortisol level was normal.   ----- Message -----  From: Lab, Background User  Sent: 3/19/2025   3:20 PM CDT  To: TRACEY Brown

## 2025-07-21 NOTE — TELEPHONE ENCOUNTER
Mometasone script sent instead  Dr Mckeon    Pt had not shown up for her [facility] appt.  Called and spoke with pt.  Pt stated that she came but no one knew that she had an appt.  Pt was at the wrong place.  Pt had gone to [facility].  Pt stated that she had made it home already and would like to reschedule.  Pt rescheduled to 8/14/23 at 9 am.

## 2025-07-23 ENCOUNTER — OFFICE VISIT (OUTPATIENT)
Dept: GYNECOLOGY | Facility: CLINIC | Age: 65
End: 2025-07-23
Payer: MEDICARE

## 2025-07-23 VITALS
RESPIRATION RATE: 18 BRPM | TEMPERATURE: 98 F | HEIGHT: 59 IN | HEART RATE: 66 BPM | BODY MASS INDEX: 38.1 KG/M2 | SYSTOLIC BLOOD PRESSURE: 123 MMHG | WEIGHT: 189 LBS | DIASTOLIC BLOOD PRESSURE: 79 MMHG | OXYGEN SATURATION: 97 %

## 2025-07-23 DIAGNOSIS — Z01.419 WOMEN'S ANNUAL ROUTINE GYNECOLOGICAL EXAMINATION: Primary | ICD-10-CM

## 2025-07-23 DIAGNOSIS — R10.2 PELVIC PRESSURE IN FEMALE: ICD-10-CM

## 2025-07-23 LAB
BILIRUB SERPL-MCNC: NEGATIVE MG/DL
BLOOD URINE, POC: NEGATIVE
COLOR, POC UA: YELLOW
GLUCOSE UR QL STRIP: NEGATIVE
KETONES UR QL STRIP: NEGATIVE
LEUKOCYTE ESTERASE URINE, POC: NEGATIVE
NITRITE, POC UA: NEGATIVE
PH, POC UA: 7.5
PROTEIN, POC: NEGATIVE
SPECIFIC GRAVITY, POC UA: 1.01
UROBILINOGEN, POC UA: 0.2

## 2025-07-23 PROCEDURE — 1159F MED LIST DOCD IN RCRD: CPT | Mod: CPTII,,, | Performed by: NURSE PRACTITIONER

## 2025-07-23 PROCEDURE — 81001 URINALYSIS AUTO W/SCOPE: CPT | Mod: PBBFAC | Performed by: NURSE PRACTITIONER

## 2025-07-23 PROCEDURE — 99214 OFFICE O/P EST MOD 30 MIN: CPT | Mod: PBBFAC,25 | Performed by: NURSE PRACTITIONER

## 2025-07-23 PROCEDURE — G0101 CA SCREEN;PELVIC/BREAST EXAM: HCPCS | Mod: S$PBB,,, | Performed by: NURSE PRACTITIONER

## 2025-07-23 PROCEDURE — 1160F RVW MEDS BY RX/DR IN RCRD: CPT | Mod: CPTII,,, | Performed by: NURSE PRACTITIONER

## 2025-07-23 PROCEDURE — G0101 CA SCREEN;PELVIC/BREAST EXAM: HCPCS | Mod: PBBFAC | Performed by: NURSE PRACTITIONER

## 2025-07-23 PROCEDURE — 3078F DIAST BP <80 MM HG: CPT | Mod: CPTII,,, | Performed by: NURSE PRACTITIONER

## 2025-07-23 PROCEDURE — 1101F PT FALLS ASSESS-DOCD LE1/YR: CPT | Mod: CPTII,,, | Performed by: NURSE PRACTITIONER

## 2025-07-23 PROCEDURE — 3074F SYST BP LT 130 MM HG: CPT | Mod: CPTII,,, | Performed by: NURSE PRACTITIONER

## 2025-07-23 PROCEDURE — 3288F FALL RISK ASSESSMENT DOCD: CPT | Mod: CPTII,,, | Performed by: NURSE PRACTITIONER

## 2025-07-23 NOTE — PROGRESS NOTES
"Patient ID: Twila Felix is a 64 y.o. female.    Chief Complaint: Gynecologic Exam          HPI:  Pt is  here for annual gyn exam. Last pap smears 2023=NIL; HPV negative and 2024-NIL; HPV negative. Pt is postmenopausal.Denies vaginal bleeding or discharge.Pt is not sexually active and has not been in years. Denies vaginal dryness or irritation. Denies dysuria/hematuria. States has BM every other day. Pt had colonoscopy 2023 with 5 year recall.Denies appetite changes or wt loss. Denies breast complaints. Denies fly hx of  ovarian, uterine, or colon cancer. Pt is smoker. Sister with hx of breast cancer. Today, pt c/o occasional vaginal pressure. Pt was seen by gyn resident clinic 2024 for incidental finding of endocervical fluid extending to endometrium on pelvic US that was initially done for complaint of bloating. Pap smear was normal and ECC was insufficient. Consulted Dr. Cheney today and she recommended repeat US. Pt currently on venlafaxine which was initially started for hot flashes but is continued by her pcp for anxiety/depression. Pt states this is controlled.       Review of Systems:   Negative except for findings in HPI     Objective:   /79   Pulse 66   Temp 97.9 °F (36.6 °C) (Oral)   Resp 18   Ht 4' 11" (1.499 m)   Wt 85.7 kg (189 lb)   SpO2 97%   BMI 38.17 kg/m²    Physical Exam:  GENERAL: Pt is aware and alert and  in no acute distress.  BREASTS: Bilateral-No masses, nipple discharge, skin changes, or tenderness.  ABDOMEN: Soft, non tender. Obese  VULVA:  No lesions or skin changes.  URETHRA: No lesions  BLADDER: No tenderness.  VAGINA: Mucosa normal,no discharge; no lesions.  CERVIX:  no CMT, NO discharge; NO lesions  BIMANUAL EXAM: limited exam secondary to body habitus;  The uterus has poor descent and is nontender. John Paul adnexa reveal no evidence of masses; no fullness however exam limited  SKIN: Warm and Dry  PSYCHIATRIC: Patient is awake and alert. Mood and affect " are normal.     Assessment:   Women's annual routine gynecological examination    Pelvic pressure in female  -     POCT urinalysis, dipstick or tablet reag  -     US Pelvis Comp with Transvag NON-OB (xpd; Future; Expected date: 07/30/2025            1. Women's annual routine gynecological examination    2. Pelvic pressure in female             -pap smear is UTD  -repeat pelvic US; f/u with gyn resident clinic after US  -Contact clinic with any vaginal bleeding as this would be abnormal in postmenopausal pt.   -mammogram is UTD  Plan:       Follow up in about 1 year (around 7/23/2026).

## 2025-08-18 ENCOUNTER — OFFICE VISIT (OUTPATIENT)
Dept: GYNECOLOGY | Facility: CLINIC | Age: 65
End: 2025-08-18
Payer: MEDICARE

## 2025-08-18 VITALS
TEMPERATURE: 98 F | SYSTOLIC BLOOD PRESSURE: 118 MMHG | WEIGHT: 191 LBS | HEIGHT: 62 IN | RESPIRATION RATE: 20 BRPM | BODY MASS INDEX: 35.15 KG/M2 | DIASTOLIC BLOOD PRESSURE: 78 MMHG | HEART RATE: 63 BPM | OXYGEN SATURATION: 95 %

## 2025-08-18 DIAGNOSIS — N88.2 CERVICAL STENOSIS (UTERINE CERVIX): Primary | ICD-10-CM

## 2025-08-18 PROCEDURE — 99213 OFFICE O/P EST LOW 20 MIN: CPT | Mod: PBBFAC

## 2025-08-18 RX ORDER — AMLODIPINE BESYLATE 2.5 MG/1
2.5 TABLET ORAL
COMMUNITY
Start: 2025-08-15

## 2025-08-20 DIAGNOSIS — F32.A DEPRESSION, UNSPECIFIED DEPRESSION TYPE: Primary | ICD-10-CM

## 2025-08-20 DIAGNOSIS — E78.5 HYPERLIPIDEMIA, UNSPECIFIED HYPERLIPIDEMIA TYPE: ICD-10-CM

## 2025-08-25 RX ORDER — ATORVASTATIN CALCIUM 20 MG/1
20 TABLET, FILM COATED ORAL NIGHTLY
Qty: 90 TABLET | Refills: 1 | Status: SHIPPED | OUTPATIENT
Start: 2025-08-25

## 2025-08-25 RX ORDER — BUPROPION HYDROCHLORIDE 300 MG/1
300 TABLET ORAL DAILY
Qty: 30 TABLET | Refills: 6 | Status: SHIPPED | OUTPATIENT
Start: 2025-08-25 | End: 2026-08-25

## 2025-08-25 RX ORDER — VENLAFAXINE HYDROCHLORIDE 75 MG/1
75 CAPSULE, EXTENDED RELEASE ORAL DAILY
Qty: 90 CAPSULE | Refills: 1 | Status: SHIPPED | OUTPATIENT
Start: 2025-08-25

## 2025-08-25 RX ORDER — HYDROXYZINE PAMOATE 25 MG/1
25 CAPSULE ORAL NIGHTLY PRN
Qty: 30 CAPSULE | Refills: 4 | Status: SHIPPED | OUTPATIENT
Start: 2025-08-25

## 2025-08-25 RX ORDER — DICLOFENAC SODIUM 75 MG/1
75 TABLET, DELAYED RELEASE ORAL 2 TIMES DAILY PRN
Qty: 30 TABLET | Refills: 0 | OUTPATIENT
Start: 2025-08-25

## 2025-08-25 RX ORDER — AMLODIPINE BESYLATE 2.5 MG/1
2.5 TABLET ORAL
OUTPATIENT
Start: 2025-08-25

## (undated) DEVICE — SNARE EXACTO COLD

## (undated) DEVICE — KIT SURGICAL COLON .25 1.1OZ

## (undated) DEVICE — TRAP ETRAP POLYP 50 TRAY

## (undated) DEVICE — SOL IRRI STRL WATER 1000ML

## (undated) DEVICE — MANIFOLD 4 PORT

## (undated) DEVICE — FORCEP ALLIGATOR 2.8MM W/NDL